# Patient Record
Sex: FEMALE | Race: WHITE | NOT HISPANIC OR LATINO | Employment: OTHER | URBAN - METROPOLITAN AREA
[De-identification: names, ages, dates, MRNs, and addresses within clinical notes are randomized per-mention and may not be internally consistent; named-entity substitution may affect disease eponyms.]

---

## 2017-05-15 PROBLEM — L50.9 URTICARIA OF UNKNOWN ORIGIN: Status: ACTIVE | Noted: 2017-05-15

## 2018-01-08 ENCOUNTER — TELEPHONE (OUTPATIENT)
Dept: CARDIOLOGY | Facility: CLINIC | Age: 73
End: 2018-01-08

## 2018-01-08 ENCOUNTER — HOSPITAL ENCOUNTER (OUTPATIENT)
Dept: RADIOLOGY | Facility: HOSPITAL | Age: 73
Discharge: HOME | End: 2018-01-08
Attending: INTERNAL MEDICINE
Payer: MEDICARE

## 2018-01-08 ENCOUNTER — OFFICE VISIT (OUTPATIENT)
Dept: CARDIOLOGY | Facility: CLINIC | Age: 73
End: 2018-01-08
Payer: MEDICARE

## 2018-01-08 VITALS
HEIGHT: 67 IN | HEART RATE: 105 BPM | BODY MASS INDEX: 24.74 KG/M2 | WEIGHT: 157.63 LBS | DIASTOLIC BLOOD PRESSURE: 85 MMHG | SYSTOLIC BLOOD PRESSURE: 151 MMHG

## 2018-01-08 DIAGNOSIS — R03.0 ELEVATED BP WITHOUT DIAGNOSIS OF HYPERTENSION: ICD-10-CM

## 2018-01-08 DIAGNOSIS — R06.02 SOB (SHORTNESS OF BREATH): Primary | ICD-10-CM

## 2018-01-08 DIAGNOSIS — R60.0 LOWER LEG EDEMA: ICD-10-CM

## 2018-01-08 DIAGNOSIS — R01.1 MURMUR, CARDIAC: ICD-10-CM

## 2018-01-08 DIAGNOSIS — R00.0 TACHYCARDIA: ICD-10-CM

## 2018-01-08 PROCEDURE — 75571 CT HRT W/O DYE W/CA TEST: CPT | Mod: TC,PO

## 2018-01-08 PROCEDURE — 75571 CT HRT W/O DYE W/CA TEST: CPT | Mod: 26,,, | Performed by: RADIOLOGY

## 2018-01-08 PROCEDURE — 99203 OFFICE O/P NEW LOW 30 MIN: CPT | Mod: S$GLB,,, | Performed by: INTERNAL MEDICINE

## 2018-01-08 PROCEDURE — 99999 PR PBB SHADOW E&M-EST. PATIENT-LVL III: CPT | Mod: PBBFAC,,, | Performed by: INTERNAL MEDICINE

## 2018-01-08 PROCEDURE — 93000 ELECTROCARDIOGRAM COMPLETE: CPT | Mod: S$GLB,,, | Performed by: INTERNAL MEDICINE

## 2018-01-08 RX ORDER — PROMETHAZINE HYDROCHLORIDE 25 MG/1
25 TABLET ORAL EVERY 6 HOURS PRN
COMMUNITY
End: 2023-02-07

## 2018-01-08 RX ORDER — BUTALBITAL, ASPIRIN, AND CAFFEINE 325; 50; 40 MG/1; MG/1; MG/1
1 CAPSULE ORAL EVERY 4 HOURS PRN
COMMUNITY
End: 2019-09-12 | Stop reason: SDUPTHER

## 2018-01-08 NOTE — PROGRESS NOTES
Subjective:    Patient ID:  Tayler Dominguez is a 72 y.o. female who presents for Shortness of Breath and Tachycardia        Shortness of Breath   This is a new problem. The current episode started more than 1 year ago. The problem occurs every several days. The problem has been unchanged. Associated symptoms include leg swelling. Pertinent negatives include no abdominal pain, chest pain, claudication, fever, headaches, hemoptysis, orthopnea, PND, rash, sputum production, syncope, vomiting or wheezing.   Palpitations    This is a recurrent problem. The current episode started more than 1 month ago. The problem occurs intermittently. The problem has been gradually worsening. The symptoms are aggravated by exercise. Associated symptoms include an irregular heartbeat, malaise/fatigue, near-syncope (ONCE WITH PALP) and shortness of breath. Pertinent negatives include no anxiety, chest pain, coughing, diaphoresis, dizziness, fever, numbness (OCC FINGERS, L ARM), syncope, vomiting or weakness. The treatment provided no relief.   Edema   This is a chronic problem. The current episode started more than 1 year ago. The problem occurs daily. The problem has been waxing and waning. Pertinent negatives include no abdominal pain, change in bowel habit, chest pain, congestion, coughing, diaphoresis, fatigue, fever, headaches, numbness (OCC FINGERS, L ARM), rash, vomiting or weakness. She has tried nothing for the symptoms. The treatment provided mild relief.     PT WAS FOLLOWED BY DR ALEXIS, H/O RFA IN / 2013, HAD CATH THEN, HAS BEEN HAVING SOME EDEMA, SOB, AN PALP/RACING, EDEMA WORSE WITH WEARING HIGH HEALS, ,PALP WORSE WITH SINGING HIGH NOTES IN [place], LABS FROM 9/2016 NOTED, H/O MVP, SEE ROS    Past Medical History:   Diagnosis Date    Allergy     Edema     Heart murmur     MVP    Menopause     Migraine headache     Sinusitis     Supraventricular tachycardia      Past Surgical History:   Procedure Laterality Date     ABDOMINAL SURGERY      APPENDECTOMY      BREAST BIOPSY      CARDIAC CATHETERIZATION  2013    COLONOSCOPY  11/15/2013    Done by Dr. ELTON Lawrence -- report in paper chart    HYSTERECTOMY      RADIOFREQUENCY ABLATION  2013    [facility]     History reviewed. No pertinent family history.  Social History     Social History    Marital status:      Spouse name: N/A    Number of children: N/A    Years of education: N/A     Social History Main Topics    Smoking status: Never Smoker    Smokeless tobacco: Never Used    Alcohol use No    Drug use: No    Sexual activity: Not Asked     Other Topics Concern    None     Social History Narrative    None       Review of patient's allergies indicates:   Allergen Reactions    Ambien [zolpidem]     Cephalosporins     Codeine     Erythropoietin analogues     Levaquin [levofloxacin]     Macrobid [nitrofurantoin monohyd/m-cryst]     Morphine     Pcn [penicillins]     Sudal [phenylephrine-pot guaiacolsulf]     Sulfa (sulfonamide antibiotics)        Current Outpatient Prescriptions:     butalbital-aspirin-caffeine -40 mg (FIORINAL) -40 mg Cap, Take 1 capsule by mouth every 4 (four) hours as needed., Disp: , Rfl:     estradiol (ESTRACE) 2 MG tablet, TAKE ONE TABLET BY MOUTH ONCE DAILY., Disp: 90 tablet, Rfl: 3    promethazine (PHENERGAN) 25 MG tablet, Take 25 mg by mouth every 6 (six) hours as needed for Nausea., Disp: , Rfl:     Review of Systems   Constitution: Positive for malaise/fatigue. Negative for diaphoresis, fatigue, fever, weakness and night sweats.   HENT: Negative for congestion, hearing loss and nosebleeds.    Eyes: Negative for blurred vision, discharge, double vision and visual disturbance.   Cardiovascular: Positive for irregular heartbeat, leg swelling, near-syncope (ONCE WITH PALP) and palpitations. Negative for chest pain, claudication, cyanosis, dyspnea on exertion, orthopnea, paroxysmal nocturnal dyspnea and syncope.  "  Respiratory: Positive for shortness of breath. Negative for cough, hemoptysis, sleep disturbances due to breathing, sputum production and wheezing.    Endocrine: Negative for cold intolerance, heat intolerance and polyuria.   Hematologic/Lymphatic: Negative for adenopathy. Does not bruise/bleed easily.   Skin: Negative for color change, itching, nail changes and rash.   Musculoskeletal: Negative for back pain (OCC), falls (ONCE ACCIDENTAL) and joint pain.   Gastrointestinal: Negative for abdominal pain, change in bowel habit, dysphagia, heartburn, hematemesis, jaundice, melena and vomiting.   Genitourinary: Negative for dysuria, flank pain, frequency, incomplete emptying and nocturia.   Neurological: Negative for brief paralysis, difficulty with concentration, disturbances in coordination, dizziness, focal weakness, headaches, light-headedness, loss of balance, numbness (OCC FINGERS, L ARM), paresthesias, seizures, sensory change and tremors.   Psychiatric/Behavioral: Negative for altered mental status, depression, memory loss and substance abuse. The patient is not nervous/anxious.    Allergic/Immunologic: Negative for hives and persistent infections.        Objective:      Vitals:    01/08/18 1428   BP: (!) 151/85   Pulse: 105   Weight: 71.5 kg (157 lb 10.1 oz)   Height: 5' 7" (1.702 m)   PainSc: 0-No pain     Body mass index is 24.69 kg/m².    Physical Exam   Constitutional: She is oriented to person, place, and time. She appears well-developed and well-nourished. She is active.   HENT:   Head: Normocephalic and atraumatic.   Mouth/Throat: Oropharynx is clear and moist and mucous membranes are normal.   Eyes: Conjunctivae and EOM are normal. Pupils are equal, round, and reactive to light.   Neck: Trachea normal and normal range of motion. Neck supple. Normal carotid pulses, no hepatojugular reflux and no JVD present. Carotid bruit is not present. No tracheal deviation, no edema and no erythema present. No " thyromegaly present.   Cardiovascular: Normal rate, regular rhythm and normal heart sounds.   No extrasystoles are present. PMI is not displaced.  Exam reveals no gallop and no friction rub.    No murmur heard.  Pulses:       Carotid pulses are 2+ on the right side, and 2+ on the left side.       Radial pulses are 2+ on the right side, and 2+ on the left side.        Femoral pulses are 2+ on the right side, and 2+ on the left side.       Popliteal pulses are 2+ on the right side, and 2+ on the left side.        Dorsalis pedis pulses are 2+ on the right side, and 2+ on the left side.        Posterior tibial pulses are 2+ on the right side, and 2+ on the left side.   Pulmonary/Chest: Effort normal and breath sounds normal. No tachypnea and no bradypnea. She has no wheezes. She has no rales. She exhibits no tenderness.   Abdominal: Soft. Bowel sounds are normal. She exhibits no distension and no mass. There is no hepatosplenomegaly. There is no tenderness. There is no guarding and no CVA tenderness.   Musculoskeletal: Normal range of motion. She exhibits no edema or tenderness.   TRACE EDEMA,  VARICOSE VEINS BY ANKLES,    Lymphadenopathy:     She has no cervical adenopathy.   Neurological: She is alert and oriented to person, place, and time. She has normal strength. She displays no tremor. No cranial nerve deficit. She exhibits normal muscle tone. Coordination normal.   Skin: Skin is warm and dry. No rash noted. No cyanosis or erythema. No pallor.   Psychiatric: She has a normal mood and affect. Her speech is normal and behavior is normal. Judgment and thought content normal.               ..    Chemistry        Component Value Date/Time     09/30/2016 1148    K 4.6 09/30/2016 1148    CL 99 09/30/2016 1148    CO2 31 09/30/2016 1148    BUN 23 (H) 09/30/2016 1148    CREATININE 0.65 09/30/2016 1148    GLU 71 09/30/2016 1148        Component Value Date/Time    CALCIUM 9.1 09/30/2016 1148    ALKPHOS 71 09/30/2016  1148    AST 34 09/30/2016 1148    AST 37 (H) 04/18/2016 2014    ALT 29 09/30/2016 1148    BILITOT 0.5 09/30/2016 1148    ESTGFRAFRICA >60 09/30/2016 1148    EGFRNONAA >60 09/30/2016 1148            ..  Lab Results   Component Value Date    CHOL 179 09/30/2016     Lab Results   Component Value Date    HDL 78 (H) 09/30/2016     Lab Results   Component Value Date    LDLCALC 81.0 09/30/2016     Lab Results   Component Value Date    TRIG 100 09/30/2016     Lab Results   Component Value Date    CHOLHDL 43.6 09/30/2016     ..  Lab Results   Component Value Date    WBC 6.11 09/30/2016    HGB 13.0 09/30/2016    HCT 40.9 09/30/2016    MCV 96 09/30/2016     09/30/2016       Test(s) Reviewed  I have reviewed the following in detail:  [] Stress test   [] Angiography   [] Echocardiogram   [x] Labs   [x] Other:       Assessment:         ICD-10-CM ICD-9-CM   1. SOB (shortness of breath) R06.02 786.05   2. Tachycardia R00.0 785.0   3. Lower leg edema R60.0 782.3   4. Elevated BP without diagnosis of hypertension R03.0 796.2   5. Murmur, cardiac R01.1 785.2     Problem List Items Addressed This Visit        Cardiac/Vascular    Tachycardia    Elevated BP without diagnosis of hypertension    Murmur, cardiac       Other    SOB (shortness of breath) - Primary    Lower leg edema           Plan:     EKG SR, WNL, WILL CHECK LABS, STRESS ECHO, HOLTER, AND CA SCORE, WATCH BP, ALL CV CLINICALLY STABLE, NO ANGINA, NO HF, NO TIA, CHECK CLINICAL ARRHYTHMIA,CONTINUE CURRENT MEDS, EDUCATION, DIET, EXERCISERTC IN FEW WEEKS AFTER TESTS      SOB (shortness of breath)    Tachycardia    Lower leg edema    Elevated BP without diagnosis of hypertension    Murmur, cardiac    RTC Low level/low impact aerobic exercise 5x's/wk. Heart healthy diet and risk factor modification.    See labs and med orders.    Aerobic exercise 5x's/wk. Heart healthy diet and risk factor modification.    See labs and med orders.

## 2018-01-30 ENCOUNTER — CLINICAL SUPPORT (OUTPATIENT)
Dept: CARDIOLOGY | Facility: CLINIC | Age: 73
End: 2018-01-30
Attending: INTERNAL MEDICINE
Payer: MEDICARE

## 2018-01-30 ENCOUNTER — LAB VISIT (OUTPATIENT)
Dept: LAB | Facility: HOSPITAL | Age: 73
End: 2018-01-30
Attending: INTERNAL MEDICINE
Payer: MEDICARE

## 2018-01-30 DIAGNOSIS — R03.0 ELEVATED BP WITHOUT DIAGNOSIS OF HYPERTENSION: ICD-10-CM

## 2018-01-30 DIAGNOSIS — R60.0 LOWER LEG EDEMA: ICD-10-CM

## 2018-01-30 DIAGNOSIS — R01.1 MURMUR, CARDIAC: ICD-10-CM

## 2018-01-30 DIAGNOSIS — R06.02 SOB (SHORTNESS OF BREATH): ICD-10-CM

## 2018-01-30 DIAGNOSIS — R00.0 TACHYCARDIA: ICD-10-CM

## 2018-01-30 LAB
ALBUMIN SERPL BCP-MCNC: 3.2 G/DL
ALP SERPL-CCNC: 66 U/L
ALT SERPL W/O P-5'-P-CCNC: 13 U/L
ANION GAP SERPL CALC-SCNC: 8 MMOL/L
AST SERPL-CCNC: 22 U/L
BASOPHILS # BLD AUTO: 0.05 K/UL
BASOPHILS NFR BLD: 0.7 %
BILIRUB SERPL-MCNC: 0.3 MG/DL
BUN SERPL-MCNC: 26 MG/DL
CALCIUM SERPL-MCNC: 8.8 MG/DL
CHLORIDE SERPL-SCNC: 106 MMOL/L
CO2 SERPL-SCNC: 27 MMOL/L
CREAT SERPL-MCNC: 0.7 MG/DL
DIFFERENTIAL METHOD: ABNORMAL
EOSINOPHIL # BLD AUTO: 0.1 K/UL
EOSINOPHIL NFR BLD: 1.3 %
ERYTHROCYTE [DISTWIDTH] IN BLOOD BY AUTOMATED COUNT: 12.8 %
EST. GFR  (AFRICAN AMERICAN): >60 ML/MIN/1.73 M^2
EST. GFR  (NON AFRICAN AMERICAN): >60 ML/MIN/1.73 M^2
GLUCOSE SERPL-MCNC: 74 MG/DL
HCT VFR BLD AUTO: 39.5 %
HGB BLD-MCNC: 12.6 G/DL
IMM GRANULOCYTES # BLD AUTO: 0.02 K/UL
IMM GRANULOCYTES NFR BLD AUTO: 0.3 %
LYMPHOCYTES # BLD AUTO: 1.7 K/UL
LYMPHOCYTES NFR BLD: 25.4 %
MCH RBC QN AUTO: 30.1 PG
MCHC RBC AUTO-ENTMCNC: 31.9 G/DL
MCV RBC AUTO: 95 FL
MONOCYTES # BLD AUTO: 0.6 K/UL
MONOCYTES NFR BLD: 8.8 %
NEUTROPHILS # BLD AUTO: 4.3 K/UL
NEUTROPHILS NFR BLD: 63.5 %
NRBC BLD-RTO: 0 /100 WBC
PLATELET # BLD AUTO: 229 K/UL
PMV BLD AUTO: 10.9 FL
POTASSIUM SERPL-SCNC: 5 MMOL/L
PROT SERPL-MCNC: 6.8 G/DL
RBC # BLD AUTO: 4.18 M/UL
SODIUM SERPL-SCNC: 141 MMOL/L
TSH SERPL DL<=0.005 MIU/L-ACNC: 2.46 UIU/ML
WBC # BLD AUTO: 6.7 K/UL

## 2018-01-30 PROCEDURE — 85025 COMPLETE CBC W/AUTO DIFF WBC: CPT

## 2018-01-30 PROCEDURE — 93224 XTRNL ECG REC UP TO 48 HRS: CPT | Mod: S$GLB,,, | Performed by: INTERNAL MEDICINE

## 2018-01-30 PROCEDURE — 93325 DOPPLER ECHO COLOR FLOW MAPG: CPT | Mod: S$GLB,,, | Performed by: INTERNAL MEDICINE

## 2018-01-30 PROCEDURE — 93351 STRESS TTE COMPLETE: CPT | Mod: S$GLB,,, | Performed by: INTERNAL MEDICINE

## 2018-01-30 PROCEDURE — 80053 COMPREHEN METABOLIC PANEL: CPT

## 2018-01-30 PROCEDURE — 93320 DOPPLER ECHO COMPLETE: CPT | Mod: S$GLB,,, | Performed by: INTERNAL MEDICINE

## 2018-01-30 PROCEDURE — 84443 ASSAY THYROID STIM HORMONE: CPT

## 2018-01-30 PROCEDURE — 36415 COLL VENOUS BLD VENIPUNCTURE: CPT | Mod: PO

## 2018-02-01 LAB
DIASTOLIC DYSFUNCTION: YES
ESTIMATED PA SYSTOLIC PRESSURE: 27.01
MITRAL VALVE REGURGITATION: ABNORMAL
RETIRED EF AND QEF - SEE NOTES: 75 (ref 55–65)
TRICUSPID VALVE REGURGITATION: ABNORMAL

## 2018-02-02 ENCOUNTER — TELEPHONE (OUTPATIENT)
Dept: CARDIOLOGY | Facility: CLINIC | Age: 73
End: 2018-02-02

## 2018-02-19 ENCOUNTER — OFFICE VISIT (OUTPATIENT)
Dept: CARDIOLOGY | Facility: CLINIC | Age: 73
End: 2018-02-19
Payer: MEDICARE

## 2018-02-19 VITALS
HEIGHT: 67 IN | WEIGHT: 158.06 LBS | SYSTOLIC BLOOD PRESSURE: 150 MMHG | HEART RATE: 99 BPM | DIASTOLIC BLOOD PRESSURE: 82 MMHG | BODY MASS INDEX: 24.81 KG/M2

## 2018-02-19 DIAGNOSIS — I49.49 PREMATURE BEATS: ICD-10-CM

## 2018-02-19 DIAGNOSIS — I34.0 NON-RHEUMATIC MITRAL REGURGITATION: ICD-10-CM

## 2018-02-19 DIAGNOSIS — I34.0 NON-RHEUMATIC MITRAL REGURGITATION: Primary | ICD-10-CM

## 2018-02-19 DIAGNOSIS — R91.1 PULMONARY NODULE: ICD-10-CM

## 2018-02-19 DIAGNOSIS — I10 ESSENTIAL HYPERTENSION: ICD-10-CM

## 2018-02-19 DIAGNOSIS — R06.02 SOB (SHORTNESS OF BREATH): ICD-10-CM

## 2018-02-19 DIAGNOSIS — R93.1 AGATSTON CORONARY ARTERY CALCIUM SCORE LESS THAN 100: ICD-10-CM

## 2018-02-19 DIAGNOSIS — I49.49 PREMATURE BEATS: Primary | ICD-10-CM

## 2018-02-19 PROCEDURE — 99999 PR PBB SHADOW E&M-EST. PATIENT-LVL III: CPT | Mod: PBBFAC,,, | Performed by: INTERNAL MEDICINE

## 2018-02-19 PROCEDURE — 3008F BODY MASS INDEX DOCD: CPT | Mod: S$GLB,,, | Performed by: INTERNAL MEDICINE

## 2018-02-19 PROCEDURE — 1126F AMNT PAIN NOTED NONE PRSNT: CPT | Mod: S$GLB,,, | Performed by: INTERNAL MEDICINE

## 2018-02-19 PROCEDURE — 99214 OFFICE O/P EST MOD 30 MIN: CPT | Mod: S$GLB,,, | Performed by: INTERNAL MEDICINE

## 2018-02-19 PROCEDURE — 1159F MED LIST DOCD IN RCRD: CPT | Mod: S$GLB,,, | Performed by: INTERNAL MEDICINE

## 2018-02-19 RX ORDER — SODIUM CHLORIDE 9 MG/ML
INJECTION, SOLUTION INTRAVENOUS ONCE
Status: CANCELLED | OUTPATIENT
Start: 2018-02-19 | End: 2018-02-19

## 2018-02-19 RX ORDER — METOPROLOL SUCCINATE 25 MG/1
25 TABLET, EXTENDED RELEASE ORAL DAILY
Qty: 30 TABLET | Refills: 2 | Status: SHIPPED | OUTPATIENT
Start: 2018-02-19 | End: 2018-04-19 | Stop reason: SDUPTHER

## 2018-02-19 NOTE — PATIENT INSTRUCTIONS
PAULIE    Arrive for your procedure at:  [facility] ON 3/1/18, AT 0630, MAIN LOBBY/patient information desk, for PAULIE at  8am,      ? FASTING:  You MAY NOT have anything to eat or drink AFTER MIDNIGHT.  If your procedure is scheduled in the afternoon, you may have a LIGHT BREAKFAST BEFORE 6:00 A.M.  For example: Two slices of toast; black coffee or black tea.    ? MEDICATIONS:  You may take your regular morning medications with a small sip of water.  Hold or adjust the following:   Fluid pills.   Diabetes medications.   Continue: Coumadin, Plavix, Effient, & Aspirin

## 2018-02-19 NOTE — PROGRESS NOTES
Subjective:    Patient ID:  Tayler Dominguez is a 72 y.o. female who presents for Follow-up (Echo, Stress and Ca. score)        HPI    DISCUSSED TESTS AND GOALS, HOTER PVC;S, PAC'S, STRESS ECHO LVH, WITH MODERATE ECCENTRIC MR, STILL GETS SHORT OF BREATH WITH PALP, CA SCORE 1-3,LUNG NODULE,  UNCOMFORTABLE LYING ON R SIDE ,LABS, CMP, CBC /TSH OK,  , SEE ROS  Past Medical History:   Diagnosis Date    Allergy     Edema     Heart murmur     MVP    Menopause     Migraine headache     Sinusitis     Supraventricular tachycardia      Past Surgical History:   Procedure Laterality Date    ABDOMINAL SURGERY      APPENDECTOMY      BREAST BIOPSY      CARDIAC CATHETERIZATION  2013    COLONOSCOPY  11/15/2013    Done by Dr. ELTON Lawrence -- report in paper chart    HYSTERECTOMY      RADIOFREQUENCY ABLATION  2013    at [facility]     History reviewed. No pertinent family history.  Social History     Social History    Marital status:      Spouse name: N/A    Number of children: N/A    Years of education: N/A     Social History Main Topics    Smoking status: Never Smoker    Smokeless tobacco: Never Used    Alcohol use No    Drug use: No    Sexual activity: Not Asked     Other Topics Concern    None     Social History Narrative    None       Review of patient's allergies indicates:   Allergen Reactions    Ambien [zolpidem]     Cephalosporins     Codeine     Erythropoietin analogues     Levaquin [levofloxacin]     Macrobid [nitrofurantoin monohyd/m-cryst]     Morphine     Pcn [penicillins]     Sudal [phenylephrine-pot guaiacolsulf]     Sulfa (sulfonamide antibiotics)        Current Outpatient Prescriptions:     butalbital-aspirin-caffeine -40 mg (FIORINAL) -40 mg Cap, Take 1 capsule by mouth every 4 (four) hours as needed., Disp: , Rfl:     estradiol (ESTRACE) 2 MG tablet, TAKE ONE TABLET BY MOUTH ONCE DAILY., Disp: 90 tablet, Rfl: 3    promethazine (PHENERGAN) 25 MG tablet, Take 25 mg by mouth  "every 6 (six) hours as needed for Nausea., Disp: , Rfl:     metoprolol succinate (TOPROL-XL) 25 MG 24 hr tablet, Take 1 tablet (25 mg total) by mouth once daily., Disp: 30 tablet, Rfl: 2    Review of Systems   Constitution: Positive for malaise/fatigue. Negative for diaphoresis, fever, weakness and night sweats.   HENT: Negative for congestion and nosebleeds.    Eyes: Negative for blurred vision, discharge, double vision and visual disturbance.   Cardiovascular: Positive for dyspnea on exertion, irregular heartbeat and palpitations. Negative for chest pain, claudication, cyanosis, leg swelling (SOME), near-syncope, orthopnea, paroxysmal nocturnal dyspnea and syncope.   Respiratory: Positive for shortness of breath. Negative for cough, hemoptysis and wheezing.    Endocrine: Negative for cold intolerance, heat intolerance and polyuria.   Hematologic/Lymphatic: Negative for adenopathy. Does not bruise/bleed easily.   Skin: Negative for color change, itching, nail changes and rash.   Musculoskeletal: Negative for back pain (OCC) and falls (ONCE ACCIDENTAL).   Gastrointestinal: Negative for abdominal pain, change in bowel habit, dysphagia, heartburn, hematemesis, jaundice, melena and vomiting.   Genitourinary: Negative for dysuria, flank pain and frequency.   Neurological: Negative for brief paralysis, difficulty with concentration, disturbances in coordination, dizziness, focal weakness, loss of balance, numbness (OCC FINGERS, L ARM), paresthesias and tremors.   Psychiatric/Behavioral: Negative for altered mental status, depression and substance abuse. The patient is not nervous/anxious.    Allergic/Immunologic: Negative for hives and persistent infections.        Objective:      Vitals:    02/19/18 1024   BP: (!) 150/82   Pulse: 99   Weight: 71.7 kg (158 lb 1.1 oz)   Height: 5' 7" (1.702 m)   PainSc: 0-No pain     Body mass index is 24.76 kg/m².    Physical Exam   Constitutional: She is oriented to person, place, and " time. She appears well-developed and well-nourished. She is active.   HENT:   Head: Normocephalic and atraumatic.   Mouth/Throat: Oropharynx is clear and moist and mucous membranes are normal.   Eyes: Conjunctivae and EOM are normal. Pupils are equal, round, and reactive to light.   Neck: Trachea normal and normal range of motion. Neck supple. Normal carotid pulses, no hepatojugular reflux and no JVD present. Carotid bruit is not present. No tracheal deviation, no edema and no erythema present. No thyromegaly present.   Cardiovascular: Normal rate and regular rhythm.   No extrasystoles are present. PMI is not displaced.  Exam reveals no gallop and no friction rub.    Murmur heard.  High-pitched blowing holosystolic murmur is present with a grade of 2/6  at the apex  Pulses:       Carotid pulses are 2+ on the right side, and 2+ on the left side.       Radial pulses are 2+ on the right side, and 2+ on the left side.        Femoral pulses are 2+ on the right side, and 2+ on the left side.       Dorsalis pedis pulses are 2+ on the right side, and 2+ on the left side.        Posterior tibial pulses are 2+ on the right side, and 2+ on the left side.   Pulmonary/Chest: Effort normal and breath sounds normal. No tachypnea and no bradypnea. She has no wheezes. She has no rales. She exhibits no tenderness.   Abdominal: Soft. Bowel sounds are normal. She exhibits no distension and no mass. There is no hepatosplenomegaly. There is no tenderness. There is no guarding and no CVA tenderness.   Musculoskeletal: Normal range of motion. She exhibits no edema or tenderness.   TRACE EDEMA,  VARICOSE VEINS BY ANKLES,    Lymphadenopathy:     She has no cervical adenopathy.   Neurological: She is alert and oriented to person, place, and time. She has normal strength. She displays no tremor. No cranial nerve deficit. She exhibits normal muscle tone. Coordination normal.   Skin: Skin is warm and dry. No rash noted. No cyanosis or erythema.  No pallor.   Psychiatric: She has a normal mood and affect. Her speech is normal and behavior is normal. Judgment and thought content normal.               ..    Chemistry        Component Value Date/Time     01/30/2018 0920    K 5.0 01/30/2018 0920     01/30/2018 0920    CO2 27 01/30/2018 0920    BUN 26 (H) 01/30/2018 0920    CREATININE 0.7 01/30/2018 0920    GLU 74 01/30/2018 0920        Component Value Date/Time    CALCIUM 8.8 01/30/2018 0920    ALKPHOS 66 01/30/2018 0920    AST 22 01/30/2018 0920    AST 37 (H) 04/18/2016 2014    ALT 13 01/30/2018 0920    BILITOT 0.3 01/30/2018 0920    ESTGFRAFRICA >60.0 01/30/2018 0920    EGFRNONAA >60.0 01/30/2018 0920            ..  Lab Results   Component Value Date    CHOL 179 09/30/2016     Lab Results   Component Value Date    HDL 78 (H) 09/30/2016     Lab Results   Component Value Date    LDLCALC 81.0 09/30/2016     Lab Results   Component Value Date    TRIG 100 09/30/2016     Lab Results   Component Value Date    CHOLHDL 43.6 09/30/2016     ..  Lab Results   Component Value Date    WBC 6.70 01/30/2018    HGB 12.6 01/30/2018    HCT 39.5 01/30/2018    MCV 95 01/30/2018     01/30/2018       Test(s) Reviewed  I have reviewed the following in detail:  [] Stress test   [] Angiography   [x] Echocardiogram   [x] Labs   [x] Other:       Assessment:         ICD-10-CM ICD-9-CM   1. Non-rheumatic mitral regurgitation I34.0 424.0   2. Premature beats I49.49 427.60   3. Essential hypertension I10 401.9   4. Pulmonary nodule R91.1 793.11     Problem List Items Addressed This Visit        Pulmonary    Pulmonary nodule       Cardiac/Vascular    Non-rheumatic mitral regurgitation - Primary    Premature beats    Essential hypertension           Plan:     ADD METOPROLOL FOR HTN, PAULIE TO ASSESS MR, SEE PULMONOLOGIST ONCE,. ALL OTHER  CV CLINICALLY STABLE, NO ANGINA, NO HF, NO TIA, ? CLINICAL ARRHYTHMIA, PSVT, ,CONTINUE CURRENT MEDS, EDUCATION, DIET, EXERCISE       Non-rheumatic mitral regurgitation  -     Diet NPO; Standing  -     Protime-INR; Standing  -     Basic metabolic panel; Standing  -     CBC Without Differential; Standing  -     EKG 12-lead; Standing  -     Place in Outpatient; Standing    Premature beats    Essential hypertension    Pulmonary nodule    Other orders  -     metoprolol succinate (TOPROL-XL) 25 MG 24 hr tablet; Take 1 tablet (25 mg total) by mouth once daily.  Dispense: 30 tablet; Refill: 2  -     0.9%  NaCl infusion; Inject into the vein once.    RTC Low level/low impact aerobic exercise 5x's/wk. Heart healthy diet and risk factor modification.    See labs and med orders.    Aerobic exercise 5x's/wk. Heart healthy diet and risk factor modification.    See labs and med orders.

## 2018-03-12 ENCOUNTER — PATIENT MESSAGE (OUTPATIENT)
Dept: CARDIOLOGY | Facility: CLINIC | Age: 73
End: 2018-03-12

## 2018-03-13 DIAGNOSIS — I73.9 CLAUDICATION: ICD-10-CM

## 2018-03-13 DIAGNOSIS — M79.661 PAIN IN BOTH LOWER LEGS: Primary | ICD-10-CM

## 2018-03-13 DIAGNOSIS — M79.662 PAIN IN BOTH LOWER LEGS: Primary | ICD-10-CM

## 2018-03-19 ENCOUNTER — CLINICAL SUPPORT (OUTPATIENT)
Dept: CARDIOLOGY | Facility: CLINIC | Age: 73
End: 2018-03-19
Attending: INTERNAL MEDICINE
Payer: MEDICARE

## 2018-03-19 DIAGNOSIS — M79.662 PAIN IN BOTH LOWER LEGS: ICD-10-CM

## 2018-03-19 DIAGNOSIS — I73.9 CLAUDICATION: ICD-10-CM

## 2018-03-19 DIAGNOSIS — M79.661 PAIN IN BOTH LOWER LEGS: ICD-10-CM

## 2018-03-19 PROCEDURE — 93970 EXTREMITY STUDY: CPT | Mod: S$GLB,,, | Performed by: INTERNAL MEDICINE

## 2018-04-19 RX ORDER — METOPROLOL SUCCINATE 25 MG/1
25 TABLET, EXTENDED RELEASE ORAL DAILY
Qty: 30 TABLET | Refills: 2 | Status: SHIPPED | OUTPATIENT
Start: 2018-04-19 | End: 2018-05-14 | Stop reason: SDUPTHER

## 2018-05-14 ENCOUNTER — OFFICE VISIT (OUTPATIENT)
Dept: CARDIOLOGY | Facility: CLINIC | Age: 73
End: 2018-05-14
Payer: MEDICARE

## 2018-05-14 VITALS
DIASTOLIC BLOOD PRESSURE: 66 MMHG | BODY MASS INDEX: 24.43 KG/M2 | HEART RATE: 72 BPM | WEIGHT: 155.63 LBS | HEIGHT: 67 IN | SYSTOLIC BLOOD PRESSURE: 122 MMHG

## 2018-05-14 DIAGNOSIS — I34.0 NON-RHEUMATIC MITRAL REGURGITATION: Primary | ICD-10-CM

## 2018-05-14 DIAGNOSIS — I10 ESSENTIAL HYPERTENSION: ICD-10-CM

## 2018-05-14 DIAGNOSIS — R93.1 AGATSTON CORONARY ARTERY CALCIUM SCORE LESS THAN 100: ICD-10-CM

## 2018-05-14 DIAGNOSIS — I49.49 PREMATURE BEATS: ICD-10-CM

## 2018-05-14 PROCEDURE — 99213 OFFICE O/P EST LOW 20 MIN: CPT | Mod: S$GLB,,, | Performed by: INTERNAL MEDICINE

## 2018-05-14 PROCEDURE — 3078F DIAST BP <80 MM HG: CPT | Mod: CPTII,S$GLB,, | Performed by: INTERNAL MEDICINE

## 2018-05-14 PROCEDURE — 3074F SYST BP LT 130 MM HG: CPT | Mod: CPTII,S$GLB,, | Performed by: INTERNAL MEDICINE

## 2018-05-14 RX ORDER — METOPROLOL SUCCINATE 25 MG/1
25 TABLET, EXTENDED RELEASE ORAL DAILY
Qty: 90 TABLET | Refills: 2 | Status: SHIPPED | OUTPATIENT
Start: 2018-05-14 | End: 2019-02-07 | Stop reason: DRUGHIGH

## 2018-05-14 NOTE — PROGRESS NOTES
Subjective:    Patient ID:  Tayler Dominguez is a 72 y.o. female who presents for Follow-up (PAULIE) and Valvular Heart Disease        HPI  S/P PAULIE MILD/ M OD MR, NO SHUNT,[id number] TO LIE  ON L SIDE, NO CP, MILD DAVIS,  SEE ROS    Past Medical History:   Diagnosis Date    Allergy     Edema     Heart murmur     MVP    Hypertension     Menopause     Migraine headache     Mitral regurgitation     Sinusitis     Supraventricular tachycardia      Past Surgical History:   Procedure Laterality Date    ABDOMINAL SURGERY      APPENDECTOMY      BREAST BIOPSY      CARDIAC CATHETERIZATION  2013    COLONOSCOPY  11/15/2013    Done by Dr. ELTON Lawrence -- report in paper chart    HYSTERECTOMY      RADIOFREQUENCY ABLATION  2013    at [facility]     Family History   Problem Relation Age of Onset    Cancer Mother     Aneurysm Mother     Hypertension Mother     Heart disease Father     Hyperlipidemia Father     Hypertension Father     Diabetes Father      Social History     Social History    Marital status:      Spouse name: N/A    Number of children: N/A    Years of education: N/A     Social History Main Topics    Smoking status: Never Smoker    Smokeless tobacco: Never Used    Alcohol use No    Drug use: No    Sexual activity: Not on file     Other Topics Concern    Not on file     Social History Narrative    No narrative on file       Review of patient's allergies indicates:   Allergen Reactions    Ambien [zolpidem]     Cephalosporins     Codeine     Erythropoietin analogues     Levaquin [levofloxacin]     Macrobid [nitrofurantoin monohyd/m-cryst]     Morphine     Pcn [penicillins]     Sudal [phenylephrine-pot guaiacolsulf]     Sulfa (sulfonamide antibiotics)        Current Outpatient Prescriptions:     butalbital-aspirin-caffeine -40 mg (FIORINAL) -40 mg Cap, Take 1 capsule by mouth every 4 (four) hours as needed., Disp: , Rfl:     estradiol (ESTRACE) 2 MG tablet, Take 2 mg by mouth  "every evening., Disp: , Rfl:     metoprolol succinate (TOPROL-XL) 25 MG 24 hr tablet, Take 1 tablet (25 mg total) by mouth once daily., Disp: 90 tablet, Rfl: 2    promethazine (PHENERGAN) 25 MG tablet, Take 25 mg by mouth every 6 (six) hours as needed for Nausea., Disp: , Rfl:     torsemide (DEMADEX) 20 MG Tab, Take 1 tablet (20 mg total) by mouth once daily., Disp: 30 tablet, Rfl: 11    Review of Systems   Constitution: Positive for malaise/fatigue. Negative for diaphoresis, fever, weakness and night sweats.   HENT: Negative for congestion and nosebleeds.    Eyes: Negative for blurred vision, discharge, double vision and visual disturbance.   Cardiovascular: Negative for chest pain, claudication, cyanosis, dyspnea on exertion (MILD), irregular heartbeat (OCC), leg swelling (SOME,BETTER WITH STEROIDS/ PCP), near-syncope, orthopnea, paroxysmal nocturnal dyspnea and syncope.   Respiratory: Negative for cough, hemoptysis, shortness of breath and wheezing.    Endocrine: Negative for cold intolerance and heat intolerance.   Hematologic/Lymphatic: Negative for adenopathy. Does not bruise/bleed easily.   Skin: Negative for color change, itching and rash.   Musculoskeletal: Negative for back pain (OCC) and falls (ONCE ACCIDENTAL).   Gastrointestinal: Negative for abdominal pain, change in bowel habit, dysphagia, heartburn, hematemesis, jaundice, melena and vomiting.   Genitourinary: Negative for dysuria, flank pain and frequency.   Neurological: Negative for brief paralysis, dizziness, focal weakness, loss of balance, numbness (OCC FINGERS, L ARM), paresthesias and tremors.   Psychiatric/Behavioral: Negative for altered mental status, depression and substance abuse. The patient is not nervous/anxious.         Objective:      Vitals:    05/14/18 1051   BP: 122/66   Pulse: 72   Weight: 70.6 kg (155 lb 10.3 oz)   Height: 5' 7" (1.702 m)   PainSc: 0-No pain     Body mass index is 24.38 kg/m².    Physical Exam "   Constitutional: She is oriented to person, place, and time. She appears well-developed and well-nourished. She is active.   HENT:   Head: Normocephalic and atraumatic.   Mouth/Throat: Oropharynx is clear and moist and mucous membranes are normal.   Eyes: Conjunctivae and EOM are normal. Pupils are equal, round, and reactive to light.   Neck: Trachea normal and normal range of motion. Neck supple. Normal carotid pulses, no hepatojugular reflux and no JVD present. Carotid bruit is not present. No edema and no erythema present.   Cardiovascular: Normal rate and regular rhythm.   No extrasystoles are present. PMI is not displaced.  Exam reveals no gallop and no friction rub.    Murmur heard.  High-pitched blowing holosystolic murmur is present with a grade of 2/6  at the apex  Pulses:       Carotid pulses are 2+ on the right side, and 2+ on the left side.       Radial pulses are 2+ on the right side, and 2+ on the left side.        Femoral pulses are 2+ on the right side, and 2+ on the left side.       Dorsalis pedis pulses are 2+ on the right side, and 2+ on the left side.        Posterior tibial pulses are 2+ on the right side, and 2+ on the left side.   Pulmonary/Chest: Effort normal and breath sounds normal. No tachypnea and no bradypnea. She has no wheezes. She has no rales. She exhibits no tenderness.   Abdominal: Soft. Bowel sounds are normal. She exhibits no distension. There is no hepatosplenomegaly. There is no tenderness. There is no CVA tenderness.   Musculoskeletal: Normal range of motion. She exhibits no edema.   TRACE EDEMA,  VARICOSE VEINS BY ANKLES,    Lymphadenopathy:     She has no cervical adenopathy.   Neurological: She is alert and oriented to person, place, and time. She has normal strength. She displays no tremor. No cranial nerve deficit.   Skin: Skin is warm and dry. No cyanosis or erythema.   Psychiatric: She has a normal mood and affect. Her speech is normal and behavior is normal. Judgment  and thought content normal.               ..    Chemistry        Component Value Date/Time     01/30/2018 0920    K 5.0 01/30/2018 0920     01/30/2018 0920    CO2 27 01/30/2018 0920    BUN 26 (H) 01/30/2018 0920    CREATININE 0.7 01/30/2018 0920    GLU 74 01/30/2018 0920        Component Value Date/Time    CALCIUM 8.8 01/30/2018 0920    ALKPHOS 66 01/30/2018 0920    AST 22 01/30/2018 0920    AST 37 (H) 04/18/2016 2014    ALT 13 01/30/2018 0920    BILITOT 0.3 01/30/2018 0920    ESTGFRAFRICA >60.0 01/30/2018 0920    EGFRNONAA >60.0 01/30/2018 0920            ..  Lab Results   Component Value Date    CHOL 179 09/30/2016     Lab Results   Component Value Date    HDL 78 (H) 09/30/2016     Lab Results   Component Value Date    LDLCALC 81.0 09/30/2016     Lab Results   Component Value Date    TRIG 100 09/30/2016     Lab Results   Component Value Date    CHOLHDL 43.6 09/30/2016     ..  Lab Results   Component Value Date    WBC 6.70 01/30/2018    HGB 12.6 01/30/2018    HCT 39.5 01/30/2018    MCV 95 01/30/2018     01/30/2018       Test(s) Reviewed  I have reviewed the following in detail:  [] Stress test   [] Angiography   [x] Echocardiogram   [] Labs   [] Other:       Assessment:         ICD-10-CM ICD-9-CM   1. Non-rheumatic mitral regurgitation I34.0 424.0   2. Essential hypertension I10 401.9   3. Premature beats I49.49 427.60   4. 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  Dispense: 90 tablet; Refill: 2    RTC Low level/low impact aerobic exercise 5x's/wk. Heart healthy diet and risk factor modification.    See labs and med orders.    Aerobic exercise 5x's/wk. Heart healthy diet and risk factor modification.    See labs and med orders.

## 2018-10-01 PROBLEM — B02.9 HERPES ZOSTER WITHOUT COMPLICATION: Status: ACTIVE | Noted: 2018-10-01

## 2019-01-17 PROBLEM — N23 RENAL COLIC ON LEFT SIDE: Status: ACTIVE | Noted: 2019-01-17

## 2019-02-07 ENCOUNTER — OFFICE VISIT (OUTPATIENT)
Dept: CARDIOLOGY | Facility: CLINIC | Age: 74
End: 2019-02-07
Payer: MEDICARE

## 2019-02-07 ENCOUNTER — LAB VISIT (OUTPATIENT)
Dept: LAB | Facility: HOSPITAL | Age: 74
End: 2019-02-07
Attending: INTERNAL MEDICINE
Payer: MEDICARE

## 2019-02-07 VITALS
HEIGHT: 67 IN | BODY MASS INDEX: 25.78 KG/M2 | WEIGHT: 164.25 LBS | HEART RATE: 93 BPM | DIASTOLIC BLOOD PRESSURE: 75 MMHG | SYSTOLIC BLOOD PRESSURE: 139 MMHG

## 2019-02-07 DIAGNOSIS — R07.89 ATYPICAL CHEST PAIN: ICD-10-CM

## 2019-02-07 DIAGNOSIS — I10 ESSENTIAL HYPERTENSION: Primary | ICD-10-CM

## 2019-02-07 DIAGNOSIS — I34.0 NON-RHEUMATIC MITRAL REGURGITATION: ICD-10-CM

## 2019-02-07 DIAGNOSIS — R93.1 AGATSTON CORONARY ARTERY CALCIUM SCORE LESS THAN 100: ICD-10-CM

## 2019-02-07 DIAGNOSIS — I49.49 PREMATURE BEATS: ICD-10-CM

## 2019-02-07 DIAGNOSIS — I10 ESSENTIAL HYPERTENSION: ICD-10-CM

## 2019-02-07 LAB
ANION GAP SERPL CALC-SCNC: 8 MMOL/L
BUN SERPL-MCNC: 22 MG/DL
CALCIUM SERPL-MCNC: 9.5 MG/DL
CHLORIDE SERPL-SCNC: 103 MMOL/L
CO2 SERPL-SCNC: 27 MMOL/L
CREAT SERPL-MCNC: 0.7 MG/DL
EST. GFR  (AFRICAN AMERICAN): >60 ML/MIN/1.73 M^2
EST. GFR  (NON AFRICAN AMERICAN): >60 ML/MIN/1.73 M^2
GLUCOSE SERPL-MCNC: 120 MG/DL
POTASSIUM SERPL-SCNC: 4.1 MMOL/L
SODIUM SERPL-SCNC: 138 MMOL/L

## 2019-02-07 PROCEDURE — 1100F PTFALLS ASSESS-DOCD GE2>/YR: CPT | Mod: CPTII,S$GLB,, | Performed by: INTERNAL MEDICINE

## 2019-02-07 PROCEDURE — 36415 COLL VENOUS BLD VENIPUNCTURE: CPT | Mod: PO

## 2019-02-07 PROCEDURE — 3078F DIAST BP <80 MM HG: CPT | Mod: CPTII,S$GLB,, | Performed by: INTERNAL MEDICINE

## 2019-02-07 PROCEDURE — 3075F SYST BP GE 130 - 139MM HG: CPT | Mod: CPTII,S$GLB,, | Performed by: INTERNAL MEDICINE

## 2019-02-07 PROCEDURE — 99214 OFFICE O/P EST MOD 30 MIN: CPT | Mod: S$GLB,,, | Performed by: INTERNAL MEDICINE

## 2019-02-07 PROCEDURE — 3075F PR MOST RECENT SYSTOLIC BLOOD PRESS GE 130-139MM HG: ICD-10-PCS | Mod: CPTII,S$GLB,, | Performed by: INTERNAL MEDICINE

## 2019-02-07 PROCEDURE — 3288F PR FALLS RISK ASSESSMENT DOCUMENTED: ICD-10-PCS | Mod: CPTII,S$GLB,, | Performed by: INTERNAL MEDICINE

## 2019-02-07 PROCEDURE — 99999 PR PBB SHADOW E&M-EST. PATIENT-LVL III: CPT | Mod: PBBFAC,,, | Performed by: INTERNAL MEDICINE

## 2019-02-07 PROCEDURE — 3078F PR MOST RECENT DIASTOLIC BLOOD PRESSURE < 80 MM HG: ICD-10-PCS | Mod: CPTII,S$GLB,, | Performed by: INTERNAL MEDICINE

## 2019-02-07 PROCEDURE — 3288F FALL RISK ASSESSMENT DOCD: CPT | Mod: CPTII,S$GLB,, | Performed by: INTERNAL MEDICINE

## 2019-02-07 PROCEDURE — 99999 PR PBB SHADOW E&M-EST. PATIENT-LVL III: ICD-10-PCS | Mod: PBBFAC,,, | Performed by: INTERNAL MEDICINE

## 2019-02-07 PROCEDURE — 99214 PR OFFICE/OUTPT VISIT, EST, LEVL IV, 30-39 MIN: ICD-10-PCS | Mod: S$GLB,,, | Performed by: INTERNAL MEDICINE

## 2019-02-07 PROCEDURE — 1100F PR PT FALLS ASSESS DOC 2+ FALLS/FALL W/INJURY/YR: ICD-10-PCS | Mod: CPTII,S$GLB,, | Performed by: INTERNAL MEDICINE

## 2019-02-07 PROCEDURE — 80048 BASIC METABOLIC PNL TOTAL CA: CPT

## 2019-02-07 RX ORDER — METOPROLOL SUCCINATE 50 MG/1
50 TABLET, EXTENDED RELEASE ORAL DAILY
Qty: 30 TABLET | Refills: 7 | Status: SHIPPED | OUTPATIENT
Start: 2019-02-07 | End: 2019-06-03 | Stop reason: DRUGHIGH

## 2019-02-07 RX ORDER — METOPROLOL SUCCINATE 50 MG/1
50 TABLET, EXTENDED RELEASE ORAL DAILY
Qty: 30 TABLET | Refills: 11 | Status: SHIPPED | OUTPATIENT
Start: 2019-02-07 | End: 2019-02-07 | Stop reason: SDUPTHER

## 2019-02-07 NOTE — PROGRESS NOTES
Subjective:    Patient ID:  Tayler Dominguez is a 73 y.o. female who presents for Valvular Heart Disease and Hypertension        HPI  NO LABS, DOING OK, BP ON HIGHER END, RARE  CP, NO SOB, SEE ROS    Past Medical History:   Diagnosis Date    Allergy     Edema     Heart murmur     MVP    Hypertension     Menopause     Migraine headache     Mitral regurgitation     Sinusitis     Supraventricular tachycardia      Past Surgical History:   Procedure Laterality Date    ABDOMINAL SURGERY      APPENDECTOMY      BREAST BIOPSY      CARDIAC CATHETERIZATION  2013    COLONOSCOPY  11/15/2013    Done by Dr. ELTON Lawrence -- report in paper chart    HYSTERECTOMY      [facility]RE[facility]  2013    [facility]     Family History   Problem Relation Age of Onset    Cancer Mother     Aneurysm Mother     Hypertension Mother     Heart disease Father     Hyperlipidemia Father     Hypertension Father     Diabetes Father      Social History     Socioeconomic History    Marital status:      Spouse name: None    Number of children: None    Years of education: None    Highest education level: None   Social Needs    Financial resource strain: None    Food insecurity - worry: None    Food insecurity - inability: None    Transportation needs - medical: None    Transportation needs - non-medical: None   Occupational History    None   Tobacco Use    Smoking status: Never Smoker    Smokeless tobacco: Never Used   Substance and Sexual Activity    Alcohol use: No    Drug use: No    Sexual activity: None   Other Topics Concern    None   Social History Narrative    None       Review of patient's allergies indicates:   Allergen Reactions    Ambien [zolpidem]     Cephalosporins     Codeine     Erythropoietin analogues     Levaquin [levofloxacin]     Macrobid [nitrofurantoin monohyd/m-cryst]     Morphine     Pcn [penicillins]     Sudal [phenylephrine-pot guaiacolsulf]     Sulfa (sulfonamide antibiotics)         Current Outpatient Medications:     baclofen (LIORESAL) 10 MG tablet, TAKE 1 TABLET (10 MG TOTAL) BY MOUTH 3 (THREE) TIMES DAILY. (Patient taking differently: Take 10 mg by mouth every other day. ), Disp: 90 tablet, Rfl: 11    butalbital-aspirin-caffeine -40 mg (FIORINAL) -40 mg Cap, Take 1 capsule by mouth every 4 (four) hours as needed., Disp: , Rfl:     estradiol (ESTRACE) 2 MG tablet, Take 2 mg by mouth every evening., Disp: , Rfl:     meloxicam (MOBIC) 7.5 MG tablet, Take 1 tablet (7.5 mg total) by mouth once daily., Disp: 20 tablet, Rfl: 1    promethazine (PHENERGAN) 25 MG tablet, Take 25 mg by mouth every 6 (six) hours as needed for Nausea., Disp: , Rfl:     aspirin (ECOTRIN) 81 MG EC tablet, Take 81 mg by mouth once daily., Disp: , Rfl:     ketoconazole (NIZORAL) 2 % shampoo, APPLY TO AFFECTED AREA EXTERNALLY AS DIRECTED 3 TIMES A WEEK, Disp: , Rfl: 6    methylPREDNISolone (MEDROL DOSEPACK) 4 mg tablet, use as directed, Disp: 1 Package, Rfl: 0    metoprolol succinate (TOPROL-XL) 50 MG 24 hr tablet, Take 1 tablet (50 mg total) by mouth once daily., Disp: 30 tablet, Rfl: 7    Review of Systems   Constitution: Negative for diaphoresis, fever, weakness, malaise/fatigue and night sweats.   HENT: Negative for congestion and nosebleeds.    Eyes: Negative for blurred vision, discharge, double vision and visual disturbance.   Cardiovascular: Negative for chest pain (RARE), claudication, cyanosis, dyspnea on exertion (MILD), irregular heartbeat (OCC), leg swelling (SOME,BETTER WITH STEROIDS/ PCP), near-syncope, orthopnea, paroxysmal nocturnal dyspnea and syncope. Palpitations: OCC, ONE EPISODR FEW MIN.   Respiratory: Negative for cough, hemoptysis, shortness of breath and wheezing.    Endocrine: Negative for cold intolerance and heat intolerance.   Hematologic/Lymphatic: Negative for adenopathy. Does not bruise/bleed easily.   Skin: Negative for color change, itching and rash.  "  Musculoskeletal: Negative for back pain (OCC) and falls (ONCE ACCIDENTAL).   Gastrointestinal: Negative for abdominal pain, change in bowel habit, dysphagia, heartburn, hematemesis, jaundice, melena and vomiting.   Genitourinary: Negative for dysuria, flank pain and frequency.        KIDNEY STONE IN DECEMBER   Neurological: Negative for brief paralysis, dizziness, focal weakness, loss of balance, numbness and tremors.   Psychiatric/Behavioral: Negative for altered mental status, depression and substance abuse. The patient is not nervous/anxious.    Allergic/Immunologic: Negative for hives and persistent infections.        Objective:      Vitals:    02/07/19 1321   BP: 139/75   Pulse: 93   Weight: 74.5 kg (164 lb 3.9 oz)   Height: 5' 7" (1.702 m)   PainSc: 0-No pain     Body mass index is 25.72 kg/m².    Physical Exam   Constitutional: She is oriented to person, place, and time. She appears well-developed and well-nourished. She is active.   HENT:   Head: Normocephalic and atraumatic.   Mouth/Throat: Oropharynx is clear and moist and mucous membranes are normal.   Eyes: Conjunctivae and EOM are normal. Pupils are equal, round, and reactive to light.   Neck: Trachea normal and normal range of motion. Neck supple. Normal carotid pulses, no hepatojugular reflux and no JVD present. Carotid bruit is not present. No edema and no erythema present. No thyromegaly present.   Cardiovascular: Normal rate and regular rhythm.  No extrasystoles are present. PMI is not displaced. Exam reveals no gallop and no friction rub.   Murmur heard.  High-pitched blowing holosystolic murmur is present with a grade of 2/6 at the apex.  Pulses:       Carotid pulses are 2+ on the right side, and 2+ on the left side.       Radial pulses are 2+ on the right side, and 2+ on the left side.        Femoral pulses are 2+ on the right side, and 2+ on the left side.       Dorsalis pedis pulses are 2+ on the right side, and 2+ on the left side.        " Posterior tibial pulses are 2+ on the right side, and 2+ on the left side.   Pulmonary/Chest: Effort normal and breath sounds normal. No tachypnea and no bradypnea. She has no wheezes. She has no rales. She exhibits no tenderness.   Abdominal: Soft. Bowel sounds are normal. She exhibits no mass. There is no hepatosplenomegaly. There is no tenderness. There is no CVA tenderness.   Musculoskeletal: Normal range of motion. She exhibits no edema.   TRACE EDEMA,  VARICOSE VEINS BY ANKLES,    Lymphadenopathy:     She has no cervical adenopathy.   Neurological: She is alert and oriented to person, place, and time. She has normal strength. She displays no tremor. No cranial nerve deficit.   Skin: Skin is warm and dry. No cyanosis or erythema.   Psychiatric: She has a normal mood and affect. Her speech is normal and behavior is normal.               ..    Chemistry        Component Value Date/Time     02/07/2019 1352    K 4.1 02/07/2019 1352     02/07/2019 1352    CO2 27 02/07/2019 1352    BUN 22 02/07/2019 1352    CREATININE 0.7 02/07/2019 1352     (H) 02/07/2019 1352        Component Value Date/Time    CALCIUM 9.5 02/07/2019 1352    ALKPHOS 66 01/30/2018 0920    AST 22 01/30/2018 0920    AST 37 (H) 04/18/2016 2014    ALT 13 01/30/2018 0920    BILITOT 0.3 01/30/2018 0920    ESTGFRAFRICA >60.0 02/07/2019 1352    EGFRNONAA >60.0 02/07/2019 1352            ..  Lab Results   Component Value Date    CHOL 179 09/30/2016     Lab Results   Component Value Date    HDL 78 (H) 09/30/2016     Lab Results   Component Value Date    LDLCALC 81.0 09/30/2016     Lab Results   Component Value Date    TRIG 100 09/30/2016     Lab Results   Component Value Date    CHOLHDL 43.6 09/30/2016     ..  Lab Results   Component Value Date    WBC 6.70 01/30/2018    HGB 12.6 01/30/2018    HCT 39.5 01/30/2018    MCV 95 01/30/2018     01/30/2018       Test(s) Reviewed  I have reviewed the following in detail:  [] Stress test   []  Angiography   [] Echocardiogram   [] Labs   [] Other:       Assessment:         ICD-10-CM ICD-9-CM   1. Essential hypertension I10 401.9   2. Non-rheumatic mitral regurgitation I34.0 424.0   3. Atypical chest pain R07.89 786.59   4. 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       Other    Atypical chest pain           Plan:     INCREASE TOPROL TO 50 MG, WATCH BP, ALL CV CLINICALLY STABLE, NO ANGINA, NO HF, NO TIA, NO SIGNIFICANT CLINICAL ARRHYTHMIA,CONTINUE CURRENT MEDS, EDUCATION, DIET, EXERCISE, RTC IN 8 MO, CHECK BMP TODAY      Essential hypertension  Comments:  ADJUST MEDS  Orders:  -     Basic metabolic panel; Future; Expected date: 02/07/2019  -     EKG 12-lead; Future; Expected date: 08/07/2019    Non-rheumatic mitral regurgitation  Comments:  AFTERLOAD REDUCTION    Atypical chest pain  Comments:  RARE WITH HIGH BP    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  Dispense: 30 tablet; Refill: 11  -     metoprolol succinate (TOPROL-XL) 50 MG 24 hr tablet; Take 1 tablet (50 mg total) by mouth once daily.  Dispense: 30 tablet; Refill: 7    RTC Low level/low impact aerobic exercise 5x's/wk. Heart healthy diet and risk factor modification.    See labs and med orders.    Aerobic exercise 5x's/wk. Heart healthy diet and risk factor modification.    See labs and med orders.

## 2019-04-18 RX ORDER — METOPROLOL SUCCINATE 50 MG/1
50 TABLET, EXTENDED RELEASE ORAL DAILY
Qty: 90 TABLET | Refills: 1 | Status: SHIPPED | OUTPATIENT
Start: 2019-04-18 | End: 2019-05-16 | Stop reason: SINTOL

## 2019-05-15 ENCOUNTER — TELEPHONE (OUTPATIENT)
Dept: CARDIOLOGY | Facility: CLINIC | Age: 74
End: 2019-05-15

## 2019-05-16 RX ORDER — METOPROLOL SUCCINATE 25 MG/1
25 TABLET, EXTENDED RELEASE ORAL DAILY
Qty: 30 TABLET | Refills: 3 | Status: SHIPPED | OUTPATIENT
Start: 2019-05-16 | End: 2019-08-23 | Stop reason: SDUPTHER

## 2019-05-16 NOTE — TELEPHONE ENCOUNTER
----- Message from Erika Magdaleno sent at 5/16/2019  8:39 AM CDT -----  Contact: [phone]   Type:  Patient Returning Call    Who Called:  self  Who Left Message for Patient:  nurse  Does the patient know what this is regarding?:  Toprol 25 mgs   Best Call Back Number: [phone] (home)     [address] [address]  Phone: [phone] Fax: [phone]     feels the 50 mgs causes fatigue

## 2019-08-26 RX ORDER — METOPROLOL SUCCINATE 25 MG/1
25 TABLET, EXTENDED RELEASE ORAL DAILY
Qty: 30 TABLET | Refills: 0 | Status: SHIPPED | OUTPATIENT
Start: 2019-08-26 | End: 2019-09-09 | Stop reason: SDUPTHER

## 2019-09-09 ENCOUNTER — OFFICE VISIT (OUTPATIENT)
Dept: CARDIOLOGY | Facility: CLINIC | Age: 74
End: 2019-09-09
Payer: MEDICARE

## 2019-09-09 VITALS
DIASTOLIC BLOOD PRESSURE: 77 MMHG | HEIGHT: 67 IN | BODY MASS INDEX: 23.7 KG/M2 | HEART RATE: 82 BPM | WEIGHT: 151 LBS | SYSTOLIC BLOOD PRESSURE: 137 MMHG

## 2019-09-09 DIAGNOSIS — I49.49 PREMATURE BEATS: ICD-10-CM

## 2019-09-09 DIAGNOSIS — I10 ESSENTIAL HYPERTENSION: Primary | ICD-10-CM

## 2019-09-09 DIAGNOSIS — I34.0 NON-RHEUMATIC MITRAL REGURGITATION: ICD-10-CM

## 2019-09-09 DIAGNOSIS — R00.0 TACHYCARDIA: ICD-10-CM

## 2019-09-09 DIAGNOSIS — R09.89 BRUIT OF RIGHT CAROTID ARTERY: ICD-10-CM

## 2019-09-09 PROBLEM — R01.1 MURMUR, CARDIAC: Status: RESOLVED | Noted: 2018-01-08 | Resolved: 2019-09-09

## 2019-09-09 PROBLEM — R03.0 ELEVATED BP WITHOUT DIAGNOSIS OF HYPERTENSION: Status: RESOLVED | Noted: 2018-01-08 | Resolved: 2019-09-09

## 2019-09-09 PROCEDURE — 1100F PTFALLS ASSESS-DOCD GE2>/YR: CPT | Mod: CPTII,S$GLB,, | Performed by: INTERNAL MEDICINE

## 2019-09-09 PROCEDURE — 3075F PR MOST RECENT SYSTOLIC BLOOD PRESS GE 130-139MM HG: ICD-10-PCS | Mod: CPTII,S$GLB,, | Performed by: INTERNAL MEDICINE

## 2019-09-09 PROCEDURE — 3288F PR FALLS RISK ASSESSMENT DOCUMENTED: ICD-10-PCS | Mod: CPTII,S$GLB,, | Performed by: INTERNAL MEDICINE

## 2019-09-09 PROCEDURE — 93000 ELECTROCARDIOGRAM COMPLETE: CPT | Mod: S$GLB,,, | Performed by: INTERNAL MEDICINE

## 2019-09-09 PROCEDURE — 3078F PR MOST RECENT DIASTOLIC BLOOD PRESSURE < 80 MM HG: ICD-10-PCS | Mod: CPTII,S$GLB,, | Performed by: INTERNAL MEDICINE

## 2019-09-09 PROCEDURE — 93000 EKG 12-LEAD: ICD-10-PCS | Mod: S$GLB,,, | Performed by: INTERNAL MEDICINE

## 2019-09-09 PROCEDURE — 99214 PR OFFICE/OUTPT VISIT, EST, LEVL IV, 30-39 MIN: ICD-10-PCS | Mod: S$GLB,,, | Performed by: INTERNAL MEDICINE

## 2019-09-09 PROCEDURE — 99999 PR PBB SHADOW E&M-EST. PATIENT-LVL III: ICD-10-PCS | Mod: PBBFAC,,, | Performed by: INTERNAL MEDICINE

## 2019-09-09 PROCEDURE — 99999 PR PBB SHADOW E&M-EST. PATIENT-LVL III: CPT | Mod: PBBFAC,,, | Performed by: INTERNAL MEDICINE

## 2019-09-09 PROCEDURE — 99214 OFFICE O/P EST MOD 30 MIN: CPT | Mod: S$GLB,,, | Performed by: INTERNAL MEDICINE

## 2019-09-09 PROCEDURE — 3288F FALL RISK ASSESSMENT DOCD: CPT | Mod: CPTII,S$GLB,, | Performed by: INTERNAL MEDICINE

## 2019-09-09 PROCEDURE — 3078F DIAST BP <80 MM HG: CPT | Mod: CPTII,S$GLB,, | Performed by: INTERNAL MEDICINE

## 2019-09-09 PROCEDURE — 1100F PR PT FALLS ASSESS DOC 2+ FALLS/FALL W/INJURY/YR: ICD-10-PCS | Mod: CPTII,S$GLB,, | Performed by: INTERNAL MEDICINE

## 2019-09-09 PROCEDURE — 3075F SYST BP GE 130 - 139MM HG: CPT | Mod: CPTII,S$GLB,, | Performed by: INTERNAL MEDICINE

## 2019-09-09 RX ORDER — METOPROLOL SUCCINATE 25 MG/1
25 TABLET, EXTENDED RELEASE ORAL DAILY
Qty: 90 TABLET | Refills: 2 | Status: SHIPPED | OUTPATIENT
Start: 2019-09-09 | End: 2020-04-23

## 2019-09-09 NOTE — PROGRESS NOTES
Subjective:    Patient ID:  Tayler Dominguez is a 73 y.o. female who presents for Hypertension and Valvular Heart Disease        HPI  Last  LABS BMP OK IN FEB, DOING WELL, OCC PALP, NO CHEST PAIN, NO SIGNIFICANT SHORTNESS OF BREATH, NO TIA TYPE SYMPTOMS, NO NEAR-SYNCOPE, SEE ROS    Past Medical History:   Diagnosis Date    Allergy     Edema     Heart murmur     MVP    Hypertension     Menopause     Migraine headache     Mitral regurgitation     Sinusitis     Supraventricular tachycardia      Past Surgical History:   Procedure Laterality Date    ABDOMINAL SURGERY      APPENDECTOMY      BREAST BIOPSY      CARDIAC CATHETERIZATION  2013    COLONOSCOPY  11/15/2013    Done by Dr. ELTON Lawrence -- report in paper chart    HYSTERECTOMY      RADIO[facility]REQU[facility]Y [facility]BLATION  2013    at [facility]     Family History   Problem Relation Age of Onset    Cancer Mother     Aneurysm Mother     Hypertension Mother     Heart disease Father     Hyperlipidemia Father     Hypertension Father     Diabetes Father      Social History     Socioeconomic History    Marital status:      Spouse name: Not on file    Number of children: Not on file    Years of education: Not on file    Highest education level: Not on file   Occupational History    Not on file   Social Needs    Financial resource strain: Not on file    Food insecurity:     Worry: Not on file     Inability: Not on file    Transportation needs:     Medical: Not on file     Non-medical: Not on file   Tobacco Use    Smoking status: Never Smoker    Smokeless tobacco: Never Used   Substance and Sexual Activity    Alcohol use: No    Drug use: No    Sexual activity: Not on file   Lifestyle    Physical activity:     Days per week: Not on file     Minutes per session: Not on file    Stress: Not on file   Relationships    Social connections:     Talks on phone: Not on file     Gets together: Not on file     Attends Hoahaoism service: Not on file     Active member of  club or organization: Not on file     Attends meetings of clubs or organizations: Not on file     Relationship status: Not on file   Other Topics Concern    Not on file   Social History Narrative    Not on file       Review of patient's allergies indicates:   Allergen Reactions    Ambien [zolpidem]     Cephalosporins     Cleocin [clindamycin hcl]     Codeine     Erythropoietin analogues     Levaquin [levofloxacin]     Macrobid [nitrofurantoin monohyd/m-cryst]     Morphine     Pcn [penicillins]     Sudal [phenylephrine-pot guaiacolsulf]     Sulfa (sulfonamide antibiotics)        Current Outpatient Medications:     aspirin (ECOTRIN) 81 MG EC tablet, Take 81 mg by mouth once daily., Disp: , Rfl:     baclofen (LIORESAL) 10 MG tablet, TAKE 1 TABLET (10 MG TOTAL) BY MOUTH 3 (THREE) TIMES DAILY. (Patient taking differently: Take 10 mg by mouth every other day. ), Disp: 90 tablet, Rfl: 11    estradiol (ESTRACE) 2 MG tablet, TAKE ONE TABLET BY MOUTH ONCE DAILY., Disp: 90 tablet, Rfl: 4    ketoconazole (NIZORAL) 2 % shampoo, APPLY TO AFFECTED AREA EXTERNALLY AS DIRECTED 3 TIMES A WEEK, Disp: , Rfl: 6    levocetirizine (XYZAL) 5 MG tablet, TAKE 1 TABLET (5 MG TOTAL) BY MOUTH EVERY EVENING., Disp: 30 tablet, Rfl: 11    meloxicam (MOBIC) 7.5 MG tablet, Take 1 tablet (7.5 mg total) by mouth once daily., Disp: 20 tablet, Rfl: 1    metoprolol succinate (TOPROL-XL) 25 MG 24 hr tablet, Take 1 tablet (25 mg total) by mouth once daily., Disp: 90 tablet, Rfl: 2    promethazine (PHENERGAN) 25 MG tablet, Take 25 mg by mouth every 6 (six) hours as needed for Nausea., Disp: , Rfl:     valACYclovir (VALTREX) 500 MG tablet, Take 1 tablet (500 mg total) by mouth 2 (two) times daily., Disp: 10 tablet, Rfl: 1    butalbital-aspirin-caffeine -40 mg (FIORINAL) -40 mg Cap, Take 1 capsule by mouth every 4 (four) hours as needed., Disp: 45 capsule, Rfl: 2    ondansetron (ZOFRAN) 4 MG tablet, Take 1 tablet (4 mg  "total) by mouth every 8 (eight) hours as needed for Nausea., Disp: 25 tablet, Rfl: 2    scopolamine (TRANSDERM-SCOP) 1.3-1.5 mg (1 mg over 3 days), Place 1 patch onto the skin every 72 hours., Disp: 3 patch, Rfl: 0    Review of Systems   Constitution: Negative for chills, diaphoresis, fever, malaise/fatigue and night sweats.   HENT: Negative for congestion and nosebleeds.    Eyes: Negative for blurred vision and visual disturbance.   Cardiovascular: Negative for chest pain, claudication, cyanosis, dyspnea on exertion (MILD), irregular heartbeat (OCC), leg swelling (OCC), near-syncope, orthopnea, palpitations (OCC, ONE EPISODR FEW MIN), paroxysmal nocturnal dyspnea and syncope.   Respiratory: Negative for cough, hemoptysis, shortness of breath and wheezing.    Endocrine: Negative for cold intolerance and heat intolerance.   Hematologic/Lymphatic: Negative for adenopathy. Does not bruise/bleed easily.   Skin: Negative for color change, itching and rash.   Musculoskeletal: Negative for back pain (OCC) and falls (ONCE ACCIDENTAL).   Gastrointestinal: Negative for abdominal pain, change in bowel habit, dysphagia, heartburn, hematemesis, jaundice, melena and vomiting.   Genitourinary: Negative for dysuria, flank pain and frequency.        KIDNEY STONE IN DECEMBER   Neurological: Negative for brief paralysis, dizziness, focal weakness, loss of balance, numbness, tremors and weakness.   Psychiatric/Behavioral: Negative for altered mental status and depression. The patient is not nervous/anxious.    Allergic/Immunologic: Negative for hives and persistent infections.        Objective:      Vitals:    09/09/19 1317   BP: 137/77   Pulse: 82   Weight: 68.5 kg (151 lb 0.2 oz)   Height: 5' 7" (1.702 m)   PainSc: 0-No pain     Body mass index is 23.65 kg/m².    Physical Exam   Constitutional: She is oriented to person, place, and time. She appears well-developed and well-nourished. She is active.   HENT:   Head: Normocephalic and " atraumatic.   Mouth/Throat: Oropharynx is clear and moist and mucous membranes are normal.   Eyes: Pupils are equal, round, and reactive to light. Conjunctivae and EOM are normal.   Neck: Trachea normal and normal range of motion. Neck supple. Normal carotid pulses, no hepatojugular reflux and no JVD present. Carotid bruit is not present. No edema and no erythema present. No thyromegaly present.   Cardiovascular: Normal rate and regular rhythm.  No extrasystoles are present. PMI is not displaced. Exam reveals no gallop and no friction rub.   Murmur heard.  High-pitched blowing holosystolic murmur is present with a grade of 2/6 at the apex.  Pulses:       Carotid pulses are 2+ on the right side, and 2+ on the left side.       Radial pulses are 2+ on the right side, and 2+ on the left side.        Femoral pulses are 2+ on the right side, and 2+ on the left side.       Dorsalis pedis pulses are 2+ on the right side, and 2+ on the left side.        Posterior tibial pulses are 2+ on the right side, and 2+ on the left side.   Pulmonary/Chest: Effort normal and breath sounds normal. No tachypnea and no bradypnea. She has no wheezes. She has no rales. She exhibits no tenderness.   Abdominal: Soft. Bowel sounds are normal. She exhibits no mass. There is no hepatosplenomegaly. There is no tenderness. There is no CVA tenderness.   Musculoskeletal: Normal range of motion. She exhibits no edema.   NO EDEMA,  VARICOSE VEINS BY ANKLES,    Lymphadenopathy:     She has no cervical adenopathy.   Neurological: She is alert and oriented to person, place, and time. She has normal strength. She displays no tremor. No cranial nerve deficit.   Skin: Skin is warm and dry. No cyanosis or erythema.   Psychiatric: She has a normal mood and affect. Her speech is normal and behavior is normal.               ..    Chemistry        Component Value Date/Time     02/07/2019 1352    K 4.1 02/07/2019 1352     02/07/2019 1352    CO2 27  02/07/2019 1352    BUN 22 02/07/2019 1352    CREATININE 0.7 02/07/2019 1352     (H) 02/07/2019 1352        Component Value Date/Time    CALCIUM 9.5 02/07/2019 1352    ALKPHOS 66 01/30/2018 0920    AST 22 01/30/2018 0920    AST 37 (H) 04/18/2016 2014    ALT 13 01/30/2018 0920    BILITOT 0.3 01/30/2018 0920    ESTGFRAFRICA >60.0 02/07/2019 1352    EGFRNONAA >60.0 02/07/2019 1352            ..  Lab Results   Component Value Date    CHOL 179 09/30/2016     Lab Results   Component Value Date    HDL 78 (H) 09/30/2016     Lab Results   Component Value Date    LDLCALC 81.0 09/30/2016     Lab Results   Component Value Date    TRIG 100 09/30/2016     Lab Results   Component Value Date    CHOLHDL 43.6 09/30/2016     ..  Lab Results   Component Value Date    WBC 6.70 01/30/2018    HGB 12.6 01/30/2018    HCT 39.5 01/30/2018    MCV 95 01/30/2018     01/30/2018       Test(s) Reviewed  I have reviewed the following in detail:  [] Stress test   [] Angiography   [] Echocardiogram   [x] Labs   [] Other:       Assessment:         ICD-10-CM ICD-9-CM   1. Essential hypertension I10 401.9   2. Bruit of right carotid artery R09.89 785.9   3. Premature beatsChronic I49.49 427.60   4. Non-rheumatic mitral regurgitation I34.0 424.0   5. Tachycardia R00.0 785.0     Problem List Items Addressed This Visit        Cardiac/Vascular    Tachycardia    Non-rheumatic mitral regurgitation    Premature beats    Essential hypertension - Primary    Bruit of right carotid artery    Relevant Orders    CV Ultrasound Bilateral Doppler Carotid           Plan:     EKG SR, WNL, CHECK CAROTID US SOON,PRN EXTRA 1/2 TOPROL AS EXPLAINED,  ALL OTHER CV CLINICALLY STABLE, NO ANGINA, NO HF, NO TIA, NO CLINICAL ARRHYTHMIA,CONTINUE CURRENT MEDS, EDUCATION, DIET, EXERCISE, RTC IN 8-9 MO       Essential hypertension  Comments:  STABLE  Orders:  -     EKG 12-lead    Bruit of right carotid artery  Comments:  US  Orders:  -     CV Ultrasound Bilateral Doppler  Carotid; Future    Premature beats  Comments:  BB  Orders:  -     EKG 12-lead    Non-rheumatic mitral regurgitation    Tachycardia    Other orders  -     metoprolol succinate (TOPROL-XL) 25 MG 24 hr tablet; Take 1 tablet (25 mg total) by mouth once daily.  Dispense: 90 tablet; Refill: 2    RTC Low level/low impact aerobic exercise 5x's/wk. Heart healthy diet and risk factor modification.    See labs and med orders.    Aerobic exercise 5x's/wk. Heart healthy diet and risk factor modification.    See labs and med orders.

## 2019-09-13 ENCOUNTER — CLINICAL SUPPORT (OUTPATIENT)
Dept: CARDIOLOGY | Facility: CLINIC | Age: 74
End: 2019-09-13
Attending: INTERNAL MEDICINE
Payer: MEDICARE

## 2019-09-13 DIAGNOSIS — R09.89 BRUIT OF RIGHT CAROTID ARTERY: ICD-10-CM

## 2019-09-13 LAB
LEFT ARM DIASTOLIC BLOOD PRESSURE: 77 MMHG
LEFT ARM SYSTOLIC BLOOD PRESSURE: 137 MMHG
LEFT CBA DIAS: 34 CM/S
LEFT CBA SYS: 96 CM/S
LEFT CCA DIST DIAS: 33 CM/S
LEFT CCA DIST SYS: 106 CM/S
LEFT CCA MID DIAS: 41 CM/S
LEFT CCA MID SYS: 131 CM/S
LEFT CCA PROX DIAS: 39 CM/S
LEFT CCA PROX SYS: 125 CM/S
LEFT ECA DIAS: 15 CM/S
LEFT ECA SYS: 91 CM/S
LEFT ICA DIST DIAS: 31 CM/S
LEFT ICA DIST SYS: 80 CM/S
LEFT ICA MID DIAS: 43 CM/S
LEFT ICA MID SYS: 134 CM/S
LEFT ICA PROX DIAS: 34 CM/S
LEFT ICA PROX SYS: 122 CM/S
LEFT VERTEBRAL DIAS: 26 CM/S
LEFT VERTEBRAL SYS: 77 CM/S
OHS CV CAROTID RIGHT ICA EDV HIGHEST: 40
OHS CV CAROTID ULTRASOUND LEFT ICA/CCA RATIO: 1.02
OHS CV CAROTID ULTRASOUND RIGHT ICA/CCA RATIO: 1.04
OHS CV PV CAROTID LEFT HIGHEST CCA: 131
OHS CV PV CAROTID LEFT HIGHEST ICA: 134
OHS CV PV CAROTID RIGHT HIGHEST CCA: 119
OHS CV PV CAROTID RIGHT HIGHEST ICA: 124
OHS CV US CAROTID LEFT HIGHEST EDV: 43
RIGHT ARM DIASTOLIC BLOOD PRESSURE: 77 MMHG
RIGHT ARM SYSTOLIC BLOOD PRESSURE: 137 MMHG
RIGHT CBA DIAS: 29 CM/S
RIGHT CBA SYS: 92 CM/S
RIGHT CCA DIST DIAS: 30 CM/S
RIGHT CCA DIST SYS: 100 CM/S
RIGHT CCA MID DIAS: 17 CM/S
RIGHT CCA MID SYS: 85 CM/S
RIGHT CCA PROX DIAS: 30 CM/S
RIGHT CCA PROX SYS: 119 CM/S
RIGHT ECA DIAS: 13 CM/S
RIGHT ECA SYS: 104 CM/S
RIGHT ICA DIST DIAS: 22 CM/S
RIGHT ICA DIST SYS: 62 CM/S
RIGHT ICA MID DIAS: 40 CM/S
RIGHT ICA MID SYS: 124 CM/S
RIGHT ICA PROX DIAS: 23 CM/S
RIGHT ICA PROX SYS: 90 CM/S
RIGHT VERTEBRAL DIAS: 21 CM/S
RIGHT VERTEBRAL SYS: 85 CM/S

## 2019-09-13 PROCEDURE — 93880 CV US DOPPLER CAROTID (CUPID ONLY): ICD-10-PCS | Mod: S$GLB,,, | Performed by: INTERNAL MEDICINE

## 2019-09-13 PROCEDURE — 93880 EXTRACRANIAL BILAT STUDY: CPT | Mod: S$GLB,,, | Performed by: INTERNAL MEDICINE

## 2019-09-16 ENCOUNTER — TELEPHONE (OUTPATIENT)
Dept: CARDIOLOGY | Facility: CLINIC | Age: 74
End: 2019-09-16

## 2019-12-06 PROBLEM — N20.0 KIDNEY STONE: Status: ACTIVE | Noted: 2018-12-17

## 2020-04-23 RX ORDER — METOPROLOL SUCCINATE 25 MG/1
25 TABLET, EXTENDED RELEASE ORAL DAILY
Qty: 90 TABLET | Refills: 0 | Status: SHIPPED | OUTPATIENT
Start: 2020-04-23 | End: 2020-06-12 | Stop reason: SDUPTHER

## 2020-06-12 ENCOUNTER — OFFICE VISIT (OUTPATIENT)
Dept: CARDIOLOGY | Facility: CLINIC | Age: 75
End: 2020-06-12
Payer: MEDICARE

## 2020-06-12 VITALS
SYSTOLIC BLOOD PRESSURE: 132 MMHG | WEIGHT: 164.69 LBS | OXYGEN SATURATION: 99 % | DIASTOLIC BLOOD PRESSURE: 72 MMHG | HEIGHT: 67 IN | BODY MASS INDEX: 25.85 KG/M2 | HEART RATE: 81 BPM

## 2020-06-12 DIAGNOSIS — I65.22 CAROTID STENOSIS, ASYMPTOMATIC, LEFT: ICD-10-CM

## 2020-06-12 DIAGNOSIS — I34.0 NON-RHEUMATIC MITRAL REGURGITATION: ICD-10-CM

## 2020-06-12 DIAGNOSIS — I10 ESSENTIAL HYPERTENSION: Primary | ICD-10-CM

## 2020-06-12 DIAGNOSIS — I49.49 PREMATURE BEATS: ICD-10-CM

## 2020-06-12 PROCEDURE — 99214 PR OFFICE/OUTPT VISIT, EST, LEVL IV, 30-39 MIN: ICD-10-PCS | Mod: S$GLB,,, | Performed by: INTERNAL MEDICINE

## 2020-06-12 PROCEDURE — 99999 PR PBB SHADOW E&M-EST. PATIENT-LVL IV: ICD-10-PCS | Mod: PBBFAC,,, | Performed by: INTERNAL MEDICINE

## 2020-06-12 PROCEDURE — 99214 OFFICE O/P EST MOD 30 MIN: CPT | Mod: S$GLB,,, | Performed by: INTERNAL MEDICINE

## 2020-06-12 PROCEDURE — 99999 PR PBB SHADOW E&M-EST. PATIENT-LVL IV: CPT | Mod: PBBFAC,,, | Performed by: INTERNAL MEDICINE

## 2020-06-12 RX ORDER — METOPROLOL SUCCINATE 25 MG/1
25 TABLET, EXTENDED RELEASE ORAL DAILY
Qty: 90 TABLET | Refills: 1 | Status: SHIPPED | OUTPATIENT
Start: 2020-06-12 | End: 2021-04-19

## 2020-06-12 NOTE — PROGRESS NOTES
Subjective:    Patient ID:  Tayler Dominguez is a 74 y.o. female who presents for Hyperlipidemia (LABS PCP), Hypertension, and Valvular Heart Disease        HPI  LAST LABS DEC, DOING WELL, NO CHEST PAIN NO SHORTNESS OF BREATH NO TIA TYPE SYMPTOMS NO NEAR-SYNCOPE, SEE ROS    Past Medical History:   Diagnosis Date    Allergy     Edema     Heart murmur     MVP    Hypertension     Menopause     Migraine headache     Mitral regurgitation     Sinusitis     Supraventricular tachycardia      Past Surgical History:   Procedure Laterality Date    ABDOMINAL SURGERY      APPENDECTOMY      BREAST BIOPSY      CARDIAC CATHETERIZATION  2013    COLONOSCOPY  11/15/2013    Done by Dr. ELTON Lawrence -- report in paper chart    HYSTERECTOMY      RAD[facility]RE[facility]BL[facility]  2013    at [facility]     Family History   Problem Relation Age of Onset    Cancer Mother     Aneurysm Mother     Hypertension Mother     Heart disease Father     Hyperlipidemia Father     Hypertension Father     Diabetes Father      Social History     Socioeconomic History    Marital status:      Spouse name: Not on file    Number of children: Not on file    Years of education: Not on file    Highest education level: Not on file   Occupational History    Not on file   Social Needs    Financial resource strain: Not on file    Food insecurity     Worry: Not on file     Inability: Not on file    Transportation needs     Medical: Not on file     Non-medical: Not on file   Tobacco Use    Smoking status: Never Smoker    Smokeless tobacco: Never Used   Substance and Sexual Activity    Alcohol use: No    Drug use: No    Sexual activity: Not on file   Lifestyle    Physical activity     Days per week: Not on file     Minutes per session: Not on file    Stress: Not on file   Relationships    Social connections     Talks on phone: Not on file     Gets together: Not on file     Attends Druze service: Not on file     Active member of club or  organization: Not on file     Attends meetings of clubs or organizations: Not on file     Relationship status: Not on file   Other Topics Concern    Not on file   Social History Narrative    Not on file       Review of patient's allergies indicates:   Allergen Reactions    Ambien [zolpidem]     Cephalosporins     Cleocin [clindamycin hcl]     Codeine     Erythropoietin analogues     Levaquin [levofloxacin]     Macrobid [nitrofurantoin monohyd/m-cryst]     Morphine     Pcn [penicillins]     Sudal [phenylephrine-pot guaiacolsulf]     Sulfa (sulfonamide antibiotics)        Current Outpatient Medications:     alendronate (FOSAMAX) 70 MG tablet, Take 1 tablet (70 mg total) by mouth every 7 days., Disp: 4 tablet, Rfl: 11    aspirin (ECOTRIN) 81 MG EC tablet, Take 81 mg by mouth once daily., Disp: , Rfl:     baclofen (LIORESAL) 10 MG tablet, Take 1 tablet (10 mg total) by mouth 3 (three) times daily., Disp: 30 tablet, Rfl: 2    butalbital-aspirin-caffeine -40 mg (FIORINAL) -40 mg Cap, Take 1 capsule by mouth every 4 (four) hours as needed., Disp: 45 capsule, Rfl: 2    diltiaZEM HCl (TIAZAC) 120 mg 24 hr capsule, Take 1 capsule (120 mg total) by mouth once daily., Disp: 90 capsule, Rfl: 3    diphenoxylate-atropine 2.5-0.025 mg (LOMOTIL) 2.5-0.025 mg per tablet, Take 1 tablet by mouth 4 (four) times daily as needed for Diarrhea., Disp: 30 tablet, Rfl: 1    estradiol (ESTRACE) 2 MG tablet, TAKE ONE TABLET BY MOUTH ONCE DAILY., Disp: 90 tablet, Rfl: 4    furosemide (LASIX) 20 MG tablet, Take 1 tablet (20 mg total) by mouth 2 (two) times daily., Disp: 60 tablet, Rfl: 11    ketoconazole (NIZORAL) 2 % shampoo, APPLY TO AFFECTED AREA EXTERNALLY AS DIRECTED 3 TIMES A WEEK, Disp: , Rfl: 6    levocetirizine (XYZAL) 5 MG tablet, Take 1 tablet (5 mg total) by mouth every evening., Disp: 90 tablet, Rfl: 3    meloxicam (MOBIC) 7.5 MG tablet, Take 1 tablet (7.5 mg total) by mouth once daily. (Patient  taking differently: Take 7.5 mg by mouth as needed. ), Disp: 20 tablet, Rfl: 1    metoprolol succinate (TOPROL-XL) 25 MG 24 hr tablet, Take 1 tablet (25 mg total) by mouth once daily., Disp: 90 tablet, Rfl: 1    ondansetron (ZOFRAN) 4 MG tablet, Take 1 tablet (4 mg total) by mouth every 8 (eight) hours as needed for Nausea., Disp: 25 tablet, Rfl: 2    valACYclovir (VALTREX) 500 MG tablet, Take 1 tablet (500 mg total) by mouth 2 (two) times daily., Disp: 10 tablet, Rfl: 1    predniSONE (DELTASONE) 10 MG tablet, Take 1 tablet (10 mg total) by mouth once daily., Disp: 10 tablet, Rfl: 2    promethazine (PHENERGAN) 25 MG tablet, Take 25 mg by mouth every 6 (six) hours as needed for Nausea., Disp: , Rfl:     Review of Systems   Constitution: Negative for chills, diaphoresis, fever, malaise/fatigue and night sweats. Weight gain: SOME.   HENT: Negative for congestion and nosebleeds.    Eyes: Negative for blurred vision and visual disturbance.   Cardiovascular: Negative for chest pain, claudication, cyanosis, dyspnea on exertion (MILD), irregular heartbeat (OCC), leg swelling (OCC), near-syncope, orthopnea, palpitations, paroxysmal nocturnal dyspnea and syncope.   Respiratory: Negative for cough, hemoptysis, shortness of breath and wheezing.    Endocrine: Negative for cold intolerance and heat intolerance.   Hematologic/Lymphatic: Negative for adenopathy. Does not bruise/bleed easily.   Skin: Negative for color change, itching and rash.   Musculoskeletal: Negative for back pain (OCC) and falls. Joint pain: KNEES. Neck pain: IN PT.   Gastrointestinal: Negative for abdominal pain, change in bowel habit, dysphagia, heartburn, hematemesis, jaundice, melena and vomiting.   Genitourinary: Negative for dysuria, flank pain and frequency.   Neurological: Negative for brief paralysis, dizziness, focal weakness, loss of balance, numbness, tremors and weakness.   Psychiatric/Behavioral: Negative for altered mental status and  "depression. The patient is not nervous/anxious.    Allergic/Immunologic: Negative for hives and persistent infections.        Objective:      Vitals:    06/12/20 1149   BP: 132/72   Pulse: 81   SpO2: 99%   Weight: 74.7 kg (164 lb 10.9 oz)   Height: 5' 7" (1.702 m)   PainSc: 0-No pain     Body mass index is 25.79 kg/m².    Physical Exam   Constitutional: She is oriented to person, place, and time. She appears well-developed and well-nourished. She is active.   HENT:   Head: Normocephalic and atraumatic.   Mouth/Throat: Oropharynx is clear and moist and mucous membranes are normal.   Eyes: Pupils are equal, round, and reactive to light. Conjunctivae and EOM are normal.   Neck: Trachea normal and normal range of motion. Neck supple. Normal carotid pulses, no hepatojugular reflux and no JVD present. Carotid bruit is not present. No edema and no erythema present. No thyromegaly present.   Cardiovascular: Normal rate and regular rhythm.  No extrasystoles are present. PMI is not displaced. Exam reveals no gallop and no friction rub.   Murmur heard.  High-pitched blowing holosystolic murmur is present with a grade of 2/6 at the apex.  Pulses:       Carotid pulses are 2+ on the right side, and 2+ on the left side.       Radial pulses are 2+ on the right side, and 2+ on the left side.        Femoral pulses are 2+ on the right side, and 2+ on the left side.       Dorsalis pedis pulses are 2+ on the right side, and 2+ on the left side.        Posterior tibial pulses are 2+ on the right side, and 2+ on the left side.   Pulmonary/Chest: Effort normal and breath sounds normal. No tachypnea and no bradypnea. She has no wheezes. She has no rales. She exhibits no tenderness.   Abdominal: Soft. Bowel sounds are normal. She exhibits no mass. There is no hepatosplenomegaly. There is no tenderness. There is no CVA tenderness.   Musculoskeletal: Normal range of motion. She exhibits no edema.   NO EDEMA,  VARICOSE VEINS BY ANKLES,  "   Lymphadenopathy:     She has no cervical adenopathy.   Neurological: She is alert and oriented to person, place, and time. She has normal strength. She displays no tremor. No cranial nerve deficit.   Skin: Skin is warm and dry. No cyanosis or erythema.   Psychiatric: She has a normal mood and affect. Her speech is normal and behavior is normal.               ..    Chemistry        Component Value Date/Time     02/07/2019 1352    K 4.1 02/07/2019 1352     02/07/2019 1352    CO2 27 02/07/2019 1352    BUN 22 02/07/2019 1352    CREATININE 0.7 02/07/2019 1352     (H) 02/07/2019 1352        Component Value Date/Time    CALCIUM 9.5 02/07/2019 1352    ALKPHOS 66 01/30/2018 0920    AST 22 01/30/2018 0920    AST 37 (H) 04/18/2016 2014    ALT 13 01/30/2018 0920    BILITOT 0.3 01/30/2018 0920    ESTGFRAFRICA >60.0 02/07/2019 1352    EGFRNONAA >60.0 02/07/2019 1352            ..  Lab Results   Component Value Date    CHOL 179 09/30/2016     Lab Results   Component Value Date    HDL 78 (H) 09/30/2016     Lab Results   Component Value Date    LDLCALC 81.0 09/30/2016     Lab Results   Component Value Date    TRIG 100 09/30/2016     Lab Results   Component Value Date    CHOLHDL 43.6 09/30/2016     ..  Lab Results   Component Value Date    WBC 6.70 01/30/2018    HGB 12.6 01/30/2018    HCT 39.5 01/30/2018    MCV 95 01/30/2018     01/30/2018       Test(s) Reviewed  I have reviewed the following in detail:  [] Stress test   [] Angiography   [] Echocardiogram   [x] Labs   [] Other:       Assessment:         ICD-10-CM ICD-9-CM   1. Essential hypertension  I10 401.9   2. Non-rheumatic mitral regurgitation  I34.0 424.0   3. Carotid stenosis, asymptomatic, left  I65.22 433.10   4. Premature beats  I49.49 427.60     Problem List Items Addressed This Visit        Cardiac/Vascular    Non-rheumatic mitral regurgitation    Premature beats    Essential hypertension - Primary    Carotid stenosis, asymptomatic, left     Relevant Orders    CV Ultrasound Bilateral Doppler Carotid           Plan:     ALL CV CLINICALLY STABLE, NO ANGINA, NO HF, NO TIA, NO CLINICAL ARRHYTHMIA,CONTINUE CURRENT MEDS, EDUCATION, DIET, EXERCISE, LABS WITH PCP, RETURN TO CLINIC IN 6 MO WITH CAROTID ULTRASOUND      Essential hypertension    Non-rheumatic mitral regurgitation    Carotid stenosis, asymptomatic, left  -     CV Ultrasound Bilateral Doppler Carotid; Future    Premature beats    Other orders  -     metoprolol succinate (TOPROL-XL) 25 MG 24 hr tablet; Take 1 tablet (25 mg total) by mouth once daily.  Dispense: 90 tablet; Refill: 1    RTC Low level/low impact aerobic exercise 5x's/wk. Heart healthy diet and risk factor modification.    See labs and med orders.    Aerobic exercise 5x's/wk. Heart healthy diet and risk factor modification.    See labs and med orders.

## 2020-08-13 ENCOUNTER — OFFICE VISIT (OUTPATIENT)
Dept: CARDIOLOGY | Facility: CLINIC | Age: 75
End: 2020-08-13
Payer: MEDICARE

## 2020-08-13 VITALS
HEIGHT: 67 IN | DIASTOLIC BLOOD PRESSURE: 84 MMHG | SYSTOLIC BLOOD PRESSURE: 154 MMHG | WEIGHT: 166.88 LBS | HEART RATE: 76 BPM | BODY MASS INDEX: 26.19 KG/M2

## 2020-08-13 DIAGNOSIS — I20.0 CRESCENDO ANGINA: ICD-10-CM

## 2020-08-13 DIAGNOSIS — I10 ESSENTIAL HYPERTENSION: ICD-10-CM

## 2020-08-13 DIAGNOSIS — R55 NEAR SYNCOPE: Primary | ICD-10-CM

## 2020-08-13 DIAGNOSIS — Z03.818 ENCNTR FOR OBS FOR SUSP EXPSR TO OTH BIOLG AGENTS RULED OUT: ICD-10-CM

## 2020-08-13 DIAGNOSIS — I34.0 NON-RHEUMATIC MITRAL REGURGITATION: ICD-10-CM

## 2020-08-13 DIAGNOSIS — R06.02 SOB (SHORTNESS OF BREATH): ICD-10-CM

## 2020-08-13 PROBLEM — R07.2 PRECORDIAL PAIN: Status: ACTIVE | Noted: 2020-08-13

## 2020-08-13 PROCEDURE — 99999 PR PBB SHADOW E&M-EST. PATIENT-LVL IV: ICD-10-PCS | Mod: PBBFAC,,, | Performed by: INTERNAL MEDICINE

## 2020-08-13 PROCEDURE — 3008F BODY MASS INDEX DOCD: CPT | Mod: CPTII,S$GLB,, | Performed by: INTERNAL MEDICINE

## 2020-08-13 PROCEDURE — 1101F PR PT FALLS ASSESS DOC 0-1 FALLS W/OUT INJ PAST YR: ICD-10-PCS | Mod: CPTII,S$GLB,, | Performed by: INTERNAL MEDICINE

## 2020-08-13 PROCEDURE — 99214 OFFICE O/P EST MOD 30 MIN: CPT | Mod: S$GLB,,, | Performed by: INTERNAL MEDICINE

## 2020-08-13 PROCEDURE — 3008F PR BODY MASS INDEX (BMI) DOCUMENTED: ICD-10-PCS | Mod: CPTII,S$GLB,, | Performed by: INTERNAL MEDICINE

## 2020-08-13 PROCEDURE — 1159F MED LIST DOCD IN RCRD: CPT | Mod: S$GLB,,, | Performed by: INTERNAL MEDICINE

## 2020-08-13 PROCEDURE — 99999 PR PBB SHADOW E&M-EST. PATIENT-LVL IV: CPT | Mod: PBBFAC,,, | Performed by: INTERNAL MEDICINE

## 2020-08-13 PROCEDURE — 1126F PR PAIN SEVERITY QUANTIFIED, NO PAIN PRESENT: ICD-10-PCS | Mod: S$GLB,,, | Performed by: INTERNAL MEDICINE

## 2020-08-13 PROCEDURE — 1101F PT FALLS ASSESS-DOCD LE1/YR: CPT | Mod: CPTII,S$GLB,, | Performed by: INTERNAL MEDICINE

## 2020-08-13 PROCEDURE — 1126F AMNT PAIN NOTED NONE PRSNT: CPT | Mod: S$GLB,,, | Performed by: INTERNAL MEDICINE

## 2020-08-13 PROCEDURE — 3079F PR MOST RECENT DIASTOLIC BLOOD PRESSURE 80-89 MM HG: ICD-10-PCS | Mod: CPTII,S$GLB,, | Performed by: INTERNAL MEDICINE

## 2020-08-13 PROCEDURE — 93000 EKG 12-LEAD: ICD-10-PCS | Mod: S$GLB,,, | Performed by: INTERNAL MEDICINE

## 2020-08-13 PROCEDURE — 3077F SYST BP >= 140 MM HG: CPT | Mod: CPTII,S$GLB,, | Performed by: INTERNAL MEDICINE

## 2020-08-13 PROCEDURE — 99214 PR OFFICE/OUTPT VISIT, EST, LEVL IV, 30-39 MIN: ICD-10-PCS | Mod: S$GLB,,, | Performed by: INTERNAL MEDICINE

## 2020-08-13 PROCEDURE — 1159F PR MEDICATION LIST DOCUMENTED IN MEDICAL RECORD: ICD-10-PCS | Mod: S$GLB,,, | Performed by: INTERNAL MEDICINE

## 2020-08-13 PROCEDURE — 3077F PR MOST RECENT SYSTOLIC BLOOD PRESSURE >= 140 MM HG: ICD-10-PCS | Mod: CPTII,S$GLB,, | Performed by: INTERNAL MEDICINE

## 2020-08-13 PROCEDURE — 3079F DIAST BP 80-89 MM HG: CPT | Mod: CPTII,S$GLB,, | Performed by: INTERNAL MEDICINE

## 2020-08-13 PROCEDURE — 93000 ELECTROCARDIOGRAM COMPLETE: CPT | Mod: S$GLB,,, | Performed by: INTERNAL MEDICINE

## 2020-08-13 RX ORDER — SODIUM CHLORIDE 0.9 % (FLUSH) 0.9 %
10 SYRINGE (ML) INJECTION
Status: DISCONTINUED | OUTPATIENT
Start: 2020-08-13 | End: 2021-11-12

## 2020-08-13 RX ORDER — SODIUM CHLORIDE 9 MG/ML
INJECTION, SOLUTION INTRAVENOUS ONCE
Status: CANCELLED | OUTPATIENT
Start: 2020-08-13 | End: 2020-08-13

## 2020-08-13 RX ORDER — NITROGLYCERIN 0.4 MG/1
0.4 TABLET SUBLINGUAL EVERY 5 MIN PRN
Qty: 25 TABLET | Refills: 0 | Status: SHIPPED | OUTPATIENT
Start: 2020-08-13

## 2020-08-13 RX ORDER — DILTIAZEM HYDROCHLORIDE 180 MG/1
180 CAPSULE, COATED, EXTENDED RELEASE ORAL DAILY
Qty: 30 CAPSULE | Refills: 1 | Status: SHIPPED | OUTPATIENT
Start: 2020-08-13 | End: 2020-10-01

## 2020-08-13 NOTE — PATIENT INSTRUCTIONS
Angiogram    Arrive for procedure at: [facility] Monday 8/18/20 @ 7:00AM.  THE PROCEDURE WILL START AT 9AM WITH DR. DIA.      You will receive a phone call from [facility] Pre-Op Department with further instructions prior to your scheduled procedure.    Notify the nurse if you are ALLERGIC TO IODINE.    FASTING: You MAY NOT have anything to eat or drink AFTER MIDNIGHT the day before your procedure. If your procedure is scheduled in the afternoon, you may have a LIGHT BREAKFAST 6-8 hours prior to your procedure.  For example: Two slices of toast; black coffee or black tea.    MEDICATIONS: You may take your regular morning medications with water. If there are any medications that you should not take, you will be instructed to hold them for that morning.    ? CARDIOLOGY PRE-PROCEDURE MEDICATION ORDERS:  ** Please hold any medications that are checked below:    HOLD   # OF DAYS TO HOLD  ? Coumadin   Consult with Coumadin Clinic   ? Xarelto    _DAY BEFORE & DAY OF_  ? Pradaxa  _ DAY BEFORE & DAY OF _  ? Eliquis   _ DAY BEFORE & DAY OF _  ? Metformin    Day before procedure & morning of procedure  ? Short acting insulin   Morning of procedure    CONTINUE the Following Medications   ? Plavix      ? Effient     ? Aspirin    WHAT TO EXPECT:    How long will the procedure take?  The procedure will take an average of 1 - 2 hours to perform.  After the procedure, you will need to lay flat for around 4 - 6 hours to minimize bleeding from the puncture site. If the wrist is accessed you will need to keep your arm still as instructed by the nurse.    When can I go home?  You may be able to be discharged home that same afternoon if there were no complications.  If you have one of the following: balloon; stent; pacemaker or defibrillator procedures, you may spend one night for observation.  Your doctor will determine your discharge based upon your progress.  The results of your procedure will be discussed with you  before you are discharged.  Any further testing or procedures will be scheduled for you either before you leave or you will be instructed to call for a future appointment.      TRANSPORTATION:  PLEASE ARRANGE TO HAVE SOMEONE DRIVE YOU HOME FOLLOWING YOUR PROCEDURE, YOU WILL NOT BE ALLOWED TO DRIVE.

## 2020-08-13 NOTE — PROGRESS NOTES
Subjective:    Patient ID:  Tayler Dominguez is a 74 y.o. female who presents for Valvular Heart Disease (MR episode this weekend ), Chest Pain (Heaviness in the chest for past  four days), and Hypertension        HPI  REQUESTED EARLIER OV, HAD NEAR SYNCOPE WHILE AT HOME DEPO, WITH CHEST HEAVINESS SOME RADIATION BETWEEN SHOULDER BLADES,, STILL WITH SOME EPISODES, PLUS SOB, BP UP, SEE ROS    Past Medical History:   Diagnosis Date    Allergy     Arthritis     hands    Crescendo angina     Edema     Heart murmur     MVP    Hypertension     Kidney stones     Menopause     Migraine headache     Mitral regurgitation     Near syncope     Sinusitis     SOB (shortness of breath)     Supraventricular tachycardia      Past Surgical History:   Procedure Laterality Date    ABDOMINAL SURGERY      APPENDECTOMY      BREAST BIOPSY      CARDIAC CATHETERIZATION  2013    CATARACT EXTRACTION W/  INTRAOCULAR LENS IMPLANT Bilateral     COLONOSCOPY  11/15/2013    Done by Dr. ELTON Lawrence -- report in paper chart    HYSTERECTOMY      RADIOFREQUENCY ABLATION  2013    at [facility]      Family History   Problem Relation Age of Onset    Cancer Mother     Aneurysm Mother     Hypertension Mother     Heart disease Father     Hyperlipidemia Father     Hypertension Father     Diabetes Father      Social History     Socioeconomic History    Marital status:      Spouse name: Not on file    Number of children: Not on file    Years of education: Not on file    Highest education level: Not on file   Occupational History    Not on file   Social Needs    Financial resource strain: Not on file    Food insecurity     Worry: Not on file     Inability: Not on file    Transportation needs     Medical: Not on file     Non-medical: Not on file   Tobacco Use    Smoking status: Never Smoker    Smokeless tobacco: Never Used   Substance and Sexual Activity    Alcohol use: No    Drug use: No    Sexual activity: Not on file    Lifestyle    Physical activity     Days per week: Not on file     Minutes per session: Not on file    Stress: Not on file   Relationships    Social connections     Talks on phone: Not on file     Gets together: Not on file     Attends Confucianism service: Not on file     Active member of club or organization: Not on file     Attends meetings of clubs or organizations: Not on file     Relationship status: Not on file   Other Topics Concern    Not on file   Social History Narrative    Not on file       Review of patient's allergies indicates:   Allergen Reactions    Ambien [zolpidem]     Cephalosporins     Cleocin [clindamycin hcl]     Codeine     Erythropoietin analogues     Levaquin [levofloxacin]     Macrobid [nitrofurantoin monohyd/m-cryst]     Morphine     Pcn [penicillins]     Sudal [phenylephrine-pot guaiacolsulf]     Sulfa (sulfonamide antibiotics)        Current Outpatient Medications:     alendronate (FOSAMAX) 70 MG tablet, Take 1 tablet (70 mg total) by mouth every 7 days., Disp: 4 tablet, Rfl: 11    aspirin (ECOTRIN) 81 MG EC tablet, Take 81 mg by mouth every evening. , Disp: , Rfl:     baclofen (LIORESAL) 10 MG tablet, Take 1 tablet (10 mg total) by mouth 3 (three) times daily. (Patient taking differently: Take 10 mg by mouth 3 (three) times daily as needed. ), Disp: 30 tablet, Rfl: 2    butalbital-aspirin-caffeine -40 mg (FIORINAL) -40 mg Cap, Take 1 capsule by mouth every 4 (four) hours as needed., Disp: 45 capsule, Rfl: 2    diphenoxylate-atropine 2.5-0.025 mg (LOMOTIL) 2.5-0.025 mg per tablet, Take 1 tablet by mouth 4 (four) times daily as needed for Diarrhea., Disp: 30 tablet, Rfl: 1    estradiol (ESTRACE) 2 MG tablet, TAKE ONE TABLET BY MOUTH ONCE DAILY. (Patient taking differently: Take 2 mg by mouth every evening. ), Disp: 90 tablet, Rfl: 4    furosemide (LASIX) 20 MG tablet, Take 1 tablet (20 mg total) by mouth 2 (two) times daily. (Patient not taking: Reported  on 8/14/2020), Disp: 60 tablet, Rfl: 11    ketoconazole (NIZORAL) 2 % shampoo, APPLY TO AFFECTED AREA EXTERNALLY AS DIRECTED 3 TIMES A WEEK, Disp: , Rfl: 6    levocetirizine (XYZAL) 5 MG tablet, TAKE 1 TABLET (5 MG TOTAL) BY MOUTH EVERY EVENING., Disp: 90 tablet, Rfl: 3    meloxicam (MOBIC) 7.5 MG tablet, Take 1 tablet (7.5 mg total) by mouth once daily. (Patient taking differently: Take 7.5 mg by mouth as needed. ), Disp: 20 tablet, Rfl: 1    metoprolol succinate (TOPROL-XL) 25 MG 24 hr tablet, Take 1 tablet (25 mg total) by mouth once daily. (Patient taking differently: Take 25 mg by mouth every evening. ), Disp: 90 tablet, Rfl: 1    ondansetron (ZOFRAN) 4 MG tablet, Take 1 tablet (4 mg total) by mouth every 8 (eight) hours as needed for Nausea., Disp: 25 tablet, Rfl: 2    promethazine (PHENERGAN) 25 MG tablet, Take 25 mg by mouth every 6 (six) hours as needed for Nausea., Disp: , Rfl:     ascorbic acid, vitamin C, (VITAMIN C) 500 MG tablet, Take 500 mg by mouth once daily., Disp: , Rfl:     diltiaZEM (CARDIZEM CD) 180 MG 24 hr capsule, Take 1 capsule (180 mg total) by mouth once daily. (Patient taking differently: Take 180 mg by mouth every evening. ), Disp: 30 capsule, Rfl: 1    nitroGLYCERIN (NITROSTAT) 0.4 MG SL tablet, Place 1 tablet (0.4 mg total) under the tongue every 5 (five) minutes as needed., Disp: 25 tablet, Rfl: 0    predniSONE (DELTASONE) 10 MG tablet, Take 1 tablet (10 mg total) by mouth once daily. (Patient not taking: Reported on 8/13/2020), Disp: 10 tablet, Rfl: 2    valACYclovir (VALTREX) 500 MG tablet, Take 1 tablet (500 mg total) by mouth 2 (two) times daily., Disp: 10 tablet, Rfl: 1    vitamin D (VITAMIN D3) 1000 units Tab, Take 1,000 Units by mouth once daily., Disp: , Rfl:     Current Facility-Administered Medications:     sodium chloride 0.9% flush 10 mL, 10 mL, Intravenous, PRN, Anjelica James MD    Review of Systems   Constitution: Positive for malaise/fatigue. Negative for  "chills, diaphoresis, fever and night sweats.   HENT: Negative for congestion and nosebleeds.    Eyes: Negative for blurred vision and visual disturbance.   Cardiovascular: Positive for chest pain, dyspnea on exertion (MILD) and near-syncope. Negative for claudication, cyanosis, irregular heartbeat (OCC), leg swelling (OCC), orthopnea, palpitations, paroxysmal nocturnal dyspnea and syncope.   Respiratory: Negative for cough, hemoptysis, shortness of breath and wheezing.    Endocrine: Negative for cold intolerance and heat intolerance.   Hematologic/Lymphatic: Negative for adenopathy. Does not bruise/bleed easily.   Skin: Negative for color change, itching and rash.   Musculoskeletal: Negative for back pain (OCC) and falls. Joint pain: KNEES. Neck pain: IN PT.   Gastrointestinal: Negative for abdominal pain, change in bowel habit, dysphagia, heartburn, hematemesis, jaundice, melena and vomiting.   Genitourinary: Negative for dysuria, flank pain and frequency.   Neurological: Negative for brief paralysis, dizziness, focal weakness, loss of balance, numbness, tremors and weakness.   Psychiatric/Behavioral: Negative for altered mental status and depression. The patient is not nervous/anxious.    Allergic/Immunologic: Negative for hives and persistent infections.        Objective:      Vitals:    08/13/20 1415   BP: (!) 154/84   Pulse: 76   Weight: 75.7 kg (166 lb 14.2 oz)   Height: 5' 7" (1.702 m)   PainSc: 0-No pain     Body mass index is 26.14 kg/m².    Physical Exam   Constitutional: She is oriented to person, place, and time. She appears well-developed and well-nourished. She is active.   HENT:   Head: Normocephalic and atraumatic.   Mouth/Throat: Oropharynx is clear and moist and mucous membranes are normal.   Eyes: Pupils are equal, round, and reactive to light. Conjunctivae and EOM are normal.   Neck: Trachea normal and normal range of motion. Neck supple. Normal carotid pulses, no hepatojugular reflux and no JVD " present. Carotid bruit is not present. No edema and no erythema present. No thyromegaly present.   Cardiovascular: Normal rate and regular rhythm.  No extrasystoles are present. PMI is not displaced. Exam reveals no gallop and no friction rub.   Murmur heard.  High-pitched blowing holosystolic murmur is present with a grade of 2/6 at the apex.  Pulses:       Carotid pulses are 2+ on the right side and 2+ on the left side.       Radial pulses are 2+ on the right side and 2+ on the left side.        Femoral pulses are 2+ on the right side and 2+ on the left side.       Dorsalis pedis pulses are 2+ on the right side and 2+ on the left side.        Posterior tibial pulses are 2+ on the right side and 2+ on the left side.   Pulmonary/Chest: Effort normal and breath sounds normal. No tachypnea and no bradypnea. She has no wheezes. She has no rales. She exhibits no tenderness.   Abdominal: Soft. Bowel sounds are normal. She exhibits no mass. There is no hepatosplenomegaly. There is no abdominal tenderness. There is no CVA tenderness.   Musculoskeletal: Normal range of motion.         General: No edema.      Comments: NO EDEMA,  VARICOSE VEINS BY ANKLES,    Lymphadenopathy:     She has no cervical adenopathy.   Neurological: She is alert and oriented to person, place, and time. She has normal strength. She displays no tremor. No cranial nerve deficit.   Skin: Skin is warm and dry. No cyanosis or erythema.   Psychiatric: She has a normal mood and affect. Her speech is normal and behavior is normal.               ..    Chemistry        Component Value Date/Time     02/07/2019 1352    K 4.1 02/07/2019 1352     02/07/2019 1352    CO2 27 02/07/2019 1352    BUN 22 02/07/2019 1352    CREATININE 0.7 02/07/2019 1352     (H) 02/07/2019 1352        Component Value Date/Time    CALCIUM 9.5 02/07/2019 1352    ALKPHOS 66 01/30/2018 0920    AST 22 01/30/2018 0920    AST 37 (H) 04/18/2016 2014    ALT 13 01/30/2018 0920     BILITOT 0.3 01/30/2018 0920    ESTGFRAFRICA >60.0 02/07/2019 1352    EGFRNONAA >60.0 02/07/2019 1352            ..  Lab Results   Component Value Date    CHOL 179 09/30/2016     Lab Results   Component Value Date    HDL 78 (H) 09/30/2016     Lab Results   Component Value Date    LDLCALC 81.0 09/30/2016     Lab Results   Component Value Date    TRIG 100 09/30/2016     Lab Results   Component Value Date    CHOLHDL 43.6 09/30/2016     ..  Lab Results   Component Value Date    WBC 6.70 01/30/2018    HGB 12.6 01/30/2018    HCT 39.5 01/30/2018    MCV 95 01/30/2018     01/30/2018       Test(s) Reviewed  I have reviewed the following in detail:  [] Stress test   [] Angiography   [] Echocardiogram   [] Labs   [x] Other:       Assessment:         ICD-10-CM ICD-9-CM   1. Near syncope  R55 780.2   2. SOB (shortness of breath)  R06.02 786.05   3. Crescendo angina  I20.0 411.1   4. Non-rheumatic mitral regurgitation  I34.0 424.0   5. Essential hypertension  I10 401.9   6. Encntr for obs for susp expsr to oth biolg agents ruled out  Z03.818 V71.83     Problem List Items Addressed This Visit        Cardiac/Vascular    Non-rheumatic mitral regurgitation    Relevant Orders    Echo Color Flow Doppler? Yes    Essential hypertension    Relevant Orders    Echo Color Flow Doppler? Yes       Other    SOB (shortness of breath)    Relevant Orders    Echo Color Flow Doppler? Yes    Case Request-Cath Lab: Left heart cath (Completed)    Full code    Near syncope - Primary    Relevant Orders    Echo Color Flow Doppler? Yes    Case Request-Cath Lab: Left heart cath (Completed)    Full code    Precordial pain      Other Visit Diagnoses     Encntr for obs for susp expsr to oth biolg agents ruled out        Relevant Orders    COVID-19 Routine Screening           Plan:         EKG NORMAL SINUS RHYTHM WITHIN NORMAL LIMITS DURING THE EPISODE, WILL INCREASE DILTIAZEM , PRN SL NTG, CHECK ECHO SOON, REASSESS MR, ALSO NEED ANGIOGRAPHY IN  VIEW OF SYMPTOMS, WILL ARRANGE FOR IT TO BE DONE SOON, RISK AND ALTERNATIVE SEND PATIENT IN DETAILS, INFORMED CONSENT WAS OBTAINED,ALL OTHER CV CLINICALLY STABLE, CLASS 3 ANGINA, NO HF, NO TIA, NO APPARENT CLINICAL ARRHYTHMIA,CONTINUE CURRENT MEDS, EDUCATION, DIET, EXERCISE, IF SYMPTOMS RECUR GO TO THE EMERGENCY ROOM  Near syncope  -     Echo Color Flow Doppler? Yes  -     Case Request-Cath Lab: Left heart cath; Standing  -     Full code; Standing  -     EKG 12-lead    SOB (shortness of breath)  -     Echo Color Flow Doppler? Yes  -     Case Request-Cath Lab: Left heart cath; Standing  -     Full code; Standing    Crescendo angina  -     Echo Color Flow Doppler? Yes  -     Case Request-Cath Lab: Left heart cath; Standing  -     Full code; Standing    Non-rheumatic mitral regurgitation  -     Echo Color Flow Doppler? Yes    Essential hypertension  -     Echo Color Flow Doppler? Yes    Encntr for obs for susp expsr to ot biolg agents ruled out  -     COVID-19 Routine Screening; Future; Expected date: 08/20/2020    Other orders  -     nitroGLYCERIN (NITROSTAT) 0.4 MG SL tablet; Place 1 tablet (0.4 mg total) under the tongue every 5 (five) minutes as needed.  Dispense: 25 tablet; Refill: 0  -     diltiaZEM (CARDIZEM CD) 180 MG 24 hr capsule; Take 1 capsule (180 mg total) by mouth once daily. (Patient taking differently: Take 180 mg by mouth every evening. )  Dispense: 30 capsule; Refill: 1  -     sodium chloride 0.9% flush 10 mL    RTC Low level/low impact aerobic exercise 5x's/wk. Heart healthy diet and risk factor modification.    See labs and med orders.    Aerobic exercise 5x's/wk. Heart healthy diet and risk factor modification.    See labs and med orders.

## 2020-08-15 ENCOUNTER — LAB VISIT (OUTPATIENT)
Dept: FAMILY MEDICINE | Facility: CLINIC | Age: 75
End: 2020-08-15
Payer: MEDICARE

## 2020-08-15 DIAGNOSIS — Z03.818 ENCNTR FOR OBS FOR SUSP EXPSR TO OTH BIOLG AGENTS RULED OUT: ICD-10-CM

## 2020-08-15 PROCEDURE — U0003 INFECTIOUS AGENT DETECTION BY NUCLEIC ACID (DNA OR RNA); SEVERE ACUTE RESPIRATORY SYNDROME CORONAVIRUS 2 (SARS-COV-2) (CORONAVIRUS DISEASE [COVID-19]), AMPLIFIED PROBE TECHNIQUE, MAKING USE OF HIGH THROUGHPUT TECHNOLOGIES AS DESCRIBED BY CMS-2020-01-R: HCPCS

## 2020-08-16 LAB — SARS-COV-2 RNA RESP QL NAA+PROBE: NOT DETECTED

## 2020-08-17 ENCOUNTER — TELEPHONE (OUTPATIENT)
Dept: CARDIOLOGY | Facility: CLINIC | Age: 75
End: 2020-08-17

## 2020-08-17 NOTE — TELEPHONE ENCOUNTER
----- Message from Randa Domínguez sent at 8/17/2020 11:43 AM CDT -----  Type:  Test Results    Who Called:  Tayler  Name of Test (Lab/Mammo/Etc):  Covid   Date of Test:  8/15  Ordering Provider:  Dr James  Where the test was performed:  [facility]  Best Call Back Number:  [phone]  Additional Information:  thank you!

## 2020-08-20 ENCOUNTER — TELEPHONE (OUTPATIENT)
Dept: CARDIOLOGY | Facility: CLINIC | Age: 75
End: 2020-08-20

## 2020-08-20 NOTE — TELEPHONE ENCOUNTER
----- Message from Carlin Quinones sent at 8/20/2020  1:11 PM CDT -----  Contact: patient  Patient called in and wanted to know if she still had to have her Echo on 8/28 since she already had her procedure on 8/18/20?    Patient call back number is [phone]

## 2020-08-28 ENCOUNTER — CLINICAL SUPPORT (OUTPATIENT)
Dept: CARDIOLOGY | Facility: CLINIC | Age: 75
End: 2020-08-28
Attending: INTERNAL MEDICINE
Payer: MEDICARE

## 2020-08-28 VITALS — BODY MASS INDEX: 26.06 KG/M2 | WEIGHT: 166 LBS | HEIGHT: 67 IN

## 2020-08-28 PROCEDURE — 99999 PR PBB SHADOW E&M-EST. PATIENT-LVL I: ICD-10-PCS | Mod: PBBFAC,,,

## 2020-08-28 PROCEDURE — 93306 ECHO (CUPID ONLY): ICD-10-PCS | Mod: S$GLB,,, | Performed by: INTERNAL MEDICINE

## 2020-08-28 PROCEDURE — 93306 TTE W/DOPPLER COMPLETE: CPT | Mod: S$GLB,,, | Performed by: INTERNAL MEDICINE

## 2020-08-28 PROCEDURE — 99999 PR PBB SHADOW E&M-EST. PATIENT-LVL I: CPT | Mod: PBBFAC,,,

## 2020-08-31 LAB
ASCENDING AORTA: 2.9 CM
AV INDEX (PROSTH): 0.92
AV MEAN GRADIENT: 3 MMHG
AV PEAK GRADIENT: 5 MMHG
AV VALVE AREA: 3.95 CM2
AV VELOCITY RATIO: 0.84
BSA FOR ECHO PROCEDURE: 1.89 M2
CV ECHO LV RWT: 0.44 CM
DOP CALC AO PEAK VEL: 1.17 M/S
DOP CALC AO VTI: 21.61 CM
DOP CALC LVOT AREA: 4.3 CM2
DOP CALC LVOT DIAMETER: 2.34 CM
DOP CALC LVOT PEAK VEL: 0.98 M/S
DOP CALC LVOT STROKE VOLUME: 85.41 CM3
DOP CALCLVOT PEAK VEL VTI: 19.87 CM
E WAVE DECELERATION TIME: 126.76 MSEC
E/A RATIO: 1.15
E/E' RATIO: 11.86 M/S
ECHO LV POSTERIOR WALL: 1 CM (ref 0.6–1.1)
FRACTIONAL SHORTENING: 41 % (ref 28–44)
INTERVENTRICULAR SEPTUM: 1.02 CM (ref 0.6–1.1)
LA MAJOR: 4.42 CM
LA MINOR: 4.1 CM
LA WIDTH: 4.54 CM
LEFT ATRIUM SIZE: 3.6 CM
LEFT ATRIUM VOLUME INDEX: 31.6 ML/M2
LEFT ATRIUM VOLUME: 59.1 CM3
LEFT INTERNAL DIMENSION IN SYSTOLE: 2.68 CM (ref 2.1–4)
LEFT VENTRICLE DIASTOLIC VOLUME INDEX: 50.38 ML/M2
LEFT VENTRICLE DIASTOLIC VOLUME: 94.12 ML
LEFT VENTRICLE MASS INDEX: 84 G/M2
LEFT VENTRICLE SYSTOLIC VOLUME INDEX: 14.2 ML/M2
LEFT VENTRICLE SYSTOLIC VOLUME: 26.47 ML
LEFT VENTRICULAR INTERNAL DIMENSION IN DIASTOLE: 4.53 CM (ref 3.5–6)
LEFT VENTRICULAR MASS: 157.06 G
LV LATERAL E/E' RATIO: 10.38 M/S
LV SEPTAL E/E' RATIO: 13.83 M/S
MV PEAK A VEL: 0.72 M/S
MV PEAK E VEL: 0.83 M/S
PISA MRMAX VEL: 0.05 M/S
PISA TR MAX VEL: 2.29 M/S
PULM VEIN S/D RATIO: 1.22
PV PEAK D VEL: 0.36 M/S
PV PEAK S VEL: 0.44 M/S
RA MAJOR: 4.16 CM
RA PRESSURE: 3 MMHG
RA WIDTH: 2.42 CM
RIGHT VENTRICULAR END-DIASTOLIC DIMENSION: 3.02 CM
SINUS: 3.16 CM
STJ: 2.94 CM
TDI LATERAL: 0.08 M/S
TDI SEPTAL: 0.06 M/S
TDI: 0.07 M/S
TR MAX PG: 21 MMHG
TRICUSPID ANNULAR PLANE SYSTOLIC EXCURSION: 2 CM
TV REST PULMONARY ARTERY PRESSURE: 24 MMHG

## 2020-09-02 ENCOUNTER — TELEPHONE (OUTPATIENT)
Dept: CARDIOLOGY | Facility: CLINIC | Age: 75
End: 2020-09-02

## 2020-09-02 NOTE — TELEPHONE ENCOUNTER
----- Message from [facility] sent at 9/2/2020 11:59 AM CDT -----  Pt called to get the results of her test please reach out to pt at [phone]

## 2020-12-07 ENCOUNTER — CLINICAL SUPPORT (OUTPATIENT)
Dept: CARDIOLOGY | Facility: CLINIC | Age: 75
End: 2020-12-07
Attending: INTERNAL MEDICINE
Payer: MEDICARE

## 2020-12-07 DIAGNOSIS — I65.22 CAROTID STENOSIS, ASYMPTOMATIC, LEFT: ICD-10-CM

## 2020-12-07 LAB
LEFT ARM DIASTOLIC BLOOD PRESSURE: 80 MMHG
LEFT ARM SYSTOLIC BLOOD PRESSURE: 136 MMHG
LEFT CBA DIAS: 21 CM/S
LEFT CBA SYS: 70 CM/S
LEFT CCA DIST DIAS: 29 CM/S
LEFT CCA DIST SYS: 75 CM/S
LEFT CCA MID DIAS: 27 CM/S
LEFT CCA MID SYS: 84 CM/S
LEFT CCA PROX DIAS: 26 CM/S
LEFT CCA PROX SYS: 89 CM/S
LEFT ECA DIAS: 12 CM/S
LEFT ECA SYS: 62 CM/S
LEFT ICA DIST DIAS: 43 CM/S
LEFT ICA DIST SYS: 102 CM/S
LEFT ICA MID DIAS: 40 CM/S
LEFT ICA MID SYS: 104 CM/S
LEFT ICA PROX DIAS: 28 CM/S
LEFT ICA PROX SYS: 78 CM/S
LEFT VERTEBRAL DIAS: 25 CM/S
LEFT VERTEBRAL SYS: 75 CM/S
OHS CV CAROTID RIGHT ICA EDV HIGHEST: 31
OHS CV CAROTID ULTRASOUND LEFT ICA/CCA RATIO: 1.39
OHS CV CAROTID ULTRASOUND RIGHT ICA/CCA RATIO: 1.28
OHS CV PV CAROTID LEFT HIGHEST CCA: 89
OHS CV PV CAROTID LEFT HIGHEST ICA: 104
OHS CV PV CAROTID RIGHT HIGHEST CCA: 82
OHS CV PV CAROTID RIGHT HIGHEST ICA: 92
OHS CV US CAROTID LEFT HIGHEST EDV: 43
RIGHT ARM DIASTOLIC BLOOD PRESSURE: 80 MMHG
RIGHT ARM SYSTOLIC BLOOD PRESSURE: 136 MMHG
RIGHT CBA DIAS: 21 CM/S
RIGHT CBA SYS: 58 CM/S
RIGHT CCA DIST DIAS: 26 CM/S
RIGHT CCA DIST SYS: 72 CM/S
RIGHT CCA MID DIAS: 26 CM/S
RIGHT CCA MID SYS: 82 CM/S
RIGHT CCA PROX DIAS: 23 CM/S
RIGHT CCA PROX SYS: 75 CM/S
RIGHT ECA DIAS: 12 CM/S
RIGHT ECA SYS: 66 CM/S
RIGHT ICA DIST DIAS: 31 CM/S
RIGHT ICA DIST SYS: 92 CM/S
RIGHT ICA MID DIAS: 20 CM/S
RIGHT ICA MID SYS: 74 CM/S
RIGHT ICA PROX DIAS: 24 CM/S
RIGHT ICA PROX SYS: 76 CM/S
RIGHT VERTEBRAL DIAS: 18 CM/S
RIGHT VERTEBRAL SYS: 63 CM/S

## 2020-12-07 PROCEDURE — 93880 EXTRACRANIAL BILAT STUDY: CPT | Mod: S$GLB,,, | Performed by: INTERNAL MEDICINE

## 2020-12-07 PROCEDURE — 93880 CV US DOPPLER CAROTID (CUPID ONLY): ICD-10-PCS | Mod: S$GLB,,, | Performed by: INTERNAL MEDICINE

## 2021-01-26 PROBLEM — Z86.16 HISTORY OF 2019 NOVEL CORONAVIRUS DISEASE (COVID-19): Status: ACTIVE | Noted: 2021-01-26

## 2021-11-17 ENCOUNTER — LAB VISIT (OUTPATIENT)
Dept: FAMILY MEDICINE | Facility: CLINIC | Age: 76
End: 2021-11-17
Payer: MEDICARE

## 2021-11-17 DIAGNOSIS — I10 ESSENTIAL HYPERTENSION: ICD-10-CM

## 2021-11-17 DIAGNOSIS — M75.51 ACUTE SHOULDER BURSITIS, RIGHT: ICD-10-CM

## 2021-11-17 DIAGNOSIS — R00.0 TACHYCARDIA: ICD-10-CM

## 2021-11-17 DIAGNOSIS — R23.8 RASH, VESICULAR: ICD-10-CM

## 2021-11-24 ENCOUNTER — LAB VISIT (OUTPATIENT)
Dept: FAMILY MEDICINE | Facility: CLINIC | Age: 76
End: 2021-11-24
Payer: MEDICARE

## 2021-11-24 DIAGNOSIS — I10 ESSENTIAL HYPERTENSION: ICD-10-CM

## 2021-11-24 DIAGNOSIS — R00.0 TACHYCARDIA: ICD-10-CM

## 2021-11-24 DIAGNOSIS — M75.51 ACUTE SHOULDER BURSITIS, RIGHT: ICD-10-CM

## 2021-11-24 DIAGNOSIS — R23.8 RASH, VESICULAR: ICD-10-CM

## 2021-11-24 PROCEDURE — 85025 COMPLETE CBC W/AUTO DIFF WBC: CPT | Performed by: FAMILY MEDICINE

## 2021-11-24 PROCEDURE — 80061 LIPID PANEL: CPT | Performed by: FAMILY MEDICINE

## 2021-11-24 PROCEDURE — 80053 COMPREHEN METABOLIC PANEL: CPT | Performed by: FAMILY MEDICINE

## 2021-11-25 LAB
ALBUMIN SERPL BCP-MCNC: 3.5 G/DL (ref 3.5–5.2)
ALP SERPL-CCNC: 62 U/L (ref 55–135)
ALT SERPL W/O P-5'-P-CCNC: 12 U/L (ref 10–44)
ANION GAP SERPL CALC-SCNC: 8 MMOL/L (ref 8–16)
AST SERPL-CCNC: 17 U/L (ref 10–40)
BASOPHILS # BLD AUTO: 0.06 K/UL (ref 0–0.2)
BASOPHILS NFR BLD: 0.7 % (ref 0–1.9)
BILIRUB SERPL-MCNC: 0.4 MG/DL (ref 0.1–1)
BUN SERPL-MCNC: 22 MG/DL (ref 8–23)
CALCIUM SERPL-MCNC: 9.5 MG/DL (ref 8.7–10.5)
CHLORIDE SERPL-SCNC: 102 MMOL/L (ref 95–110)
CHOLEST SERPL-MCNC: 183 MG/DL (ref 120–199)
CHOLEST/HDLC SERPL: 2.5 {RATIO} (ref 2–5)
CO2 SERPL-SCNC: 29 MMOL/L (ref 23–29)
CREAT SERPL-MCNC: 0.8 MG/DL (ref 0.5–1.4)
DIFFERENTIAL METHOD: ABNORMAL
EOSINOPHIL # BLD AUTO: 0.1 K/UL (ref 0–0.5)
EOSINOPHIL NFR BLD: 1.3 % (ref 0–8)
ERYTHROCYTE [DISTWIDTH] IN BLOOD BY AUTOMATED COUNT: 12.9 % (ref 11.5–14.5)
EST. GFR  (AFRICAN AMERICAN): >60 ML/MIN/1.73 M^2
EST. GFR  (NON AFRICAN AMERICAN): >60 ML/MIN/1.73 M^2
GLUCOSE SERPL-MCNC: 68 MG/DL (ref 70–110)
HCT VFR BLD AUTO: 40.8 % (ref 37–48.5)
HDLC SERPL-MCNC: 72 MG/DL (ref 40–75)
HDLC SERPL: 39.3 % (ref 20–50)
HGB BLD-MCNC: 12.8 G/DL (ref 12–16)
IMM GRANULOCYTES # BLD AUTO: 0.03 K/UL (ref 0–0.04)
IMM GRANULOCYTES NFR BLD AUTO: 0.3 % (ref 0–0.5)
LDLC SERPL CALC-MCNC: 82.8 MG/DL (ref 63–159)
LYMPHOCYTES # BLD AUTO: 1.6 K/UL (ref 1–4.8)
LYMPHOCYTES NFR BLD: 17.8 % (ref 18–48)
MCH RBC QN AUTO: 30.5 PG (ref 27–31)
MCHC RBC AUTO-ENTMCNC: 31.4 G/DL (ref 32–36)
MCV RBC AUTO: 97 FL (ref 82–98)
MONOCYTES # BLD AUTO: 0.8 K/UL (ref 0.3–1)
MONOCYTES NFR BLD: 8.8 % (ref 4–15)
NEUTROPHILS # BLD AUTO: 6.4 K/UL (ref 1.8–7.7)
NEUTROPHILS NFR BLD: 71.1 % (ref 38–73)
NONHDLC SERPL-MCNC: 111 MG/DL
NRBC BLD-RTO: 0 /100 WBC
PLATELET # BLD AUTO: 248 K/UL (ref 150–450)
PMV BLD AUTO: 11.2 FL (ref 9.2–12.9)
POTASSIUM SERPL-SCNC: 4.5 MMOL/L (ref 3.5–5.1)
PROT SERPL-MCNC: 6.7 G/DL (ref 6–8.4)
RBC # BLD AUTO: 4.2 M/UL (ref 4–5.4)
SODIUM SERPL-SCNC: 139 MMOL/L (ref 136–145)
TRIGL SERPL-MCNC: 141 MG/DL (ref 30–150)
WBC # BLD AUTO: 9.01 K/UL (ref 3.9–12.7)

## 2022-08-19 ENCOUNTER — LAB VISIT (OUTPATIENT)
Dept: FAMILY MEDICINE | Facility: CLINIC | Age: 77
End: 2022-08-19
Payer: MEDICARE

## 2022-08-19 DIAGNOSIS — Z00.00 WELL ADULT EXAM: ICD-10-CM

## 2022-08-19 DIAGNOSIS — I10 ESSENTIAL HYPERTENSION: ICD-10-CM

## 2022-08-19 LAB
ALBUMIN SERPL BCP-MCNC: 3.4 G/DL (ref 3.5–5.2)
ALP SERPL-CCNC: 56 U/L (ref 55–135)
ALT SERPL W/O P-5'-P-CCNC: 11 U/L (ref 10–44)
ANION GAP SERPL CALC-SCNC: 9 MMOL/L (ref 8–16)
AST SERPL-CCNC: 20 U/L (ref 10–40)
BASOPHILS # BLD AUTO: 0.03 K/UL (ref 0–0.2)
BASOPHILS NFR BLD: 0.5 % (ref 0–1.9)
BILIRUB SERPL-MCNC: 0.4 MG/DL (ref 0.1–1)
BUN SERPL-MCNC: 16 MG/DL (ref 8–23)
CALCIUM SERPL-MCNC: 9.2 MG/DL (ref 8.7–10.5)
CHLORIDE SERPL-SCNC: 104 MMOL/L (ref 95–110)
CHOLEST SERPL-MCNC: 218 MG/DL (ref 120–199)
CHOLEST/HDLC SERPL: 2.9 {RATIO} (ref 2–5)
CO2 SERPL-SCNC: 28 MMOL/L (ref 23–29)
CREAT SERPL-MCNC: 0.7 MG/DL (ref 0.5–1.4)
DIFFERENTIAL METHOD: ABNORMAL
EOSINOPHIL # BLD AUTO: 0 K/UL (ref 0–0.5)
EOSINOPHIL NFR BLD: 0.2 % (ref 0–8)
ERYTHROCYTE [DISTWIDTH] IN BLOOD BY AUTOMATED COUNT: 12.8 % (ref 11.5–14.5)
EST. GFR  (NO RACE VARIABLE): >60 ML/MIN/1.73 M^2
GLUCOSE SERPL-MCNC: 108 MG/DL (ref 70–110)
HCT VFR BLD AUTO: 40.3 % (ref 37–48.5)
HDLC SERPL-MCNC: 76 MG/DL (ref 40–75)
HDLC SERPL: 34.9 % (ref 20–50)
HGB BLD-MCNC: 12.9 G/DL (ref 12–16)
IMM GRANULOCYTES # BLD AUTO: 0.02 K/UL (ref 0–0.04)
IMM GRANULOCYTES NFR BLD AUTO: 0.4 % (ref 0–0.5)
LDLC SERPL CALC-MCNC: 120.8 MG/DL (ref 63–159)
LYMPHOCYTES # BLD AUTO: 1 K/UL (ref 1–4.8)
LYMPHOCYTES NFR BLD: 17.8 % (ref 18–48)
MCH RBC QN AUTO: 29.9 PG (ref 27–31)
MCHC RBC AUTO-ENTMCNC: 32 G/DL (ref 32–36)
MCV RBC AUTO: 94 FL (ref 82–98)
MONOCYTES # BLD AUTO: 0.3 K/UL (ref 0.3–1)
MONOCYTES NFR BLD: 5.9 % (ref 4–15)
NEUTROPHILS # BLD AUTO: 4.2 K/UL (ref 1.8–7.7)
NEUTROPHILS NFR BLD: 75.2 % (ref 38–73)
NONHDLC SERPL-MCNC: 142 MG/DL
NRBC BLD-RTO: 0 /100 WBC
PLATELET # BLD AUTO: 266 K/UL (ref 150–450)
PMV BLD AUTO: 10.8 FL (ref 9.2–12.9)
POTASSIUM SERPL-SCNC: 4.4 MMOL/L (ref 3.5–5.1)
PROT SERPL-MCNC: 6.8 G/DL (ref 6–8.4)
RBC # BLD AUTO: 4.31 M/UL (ref 4–5.4)
SODIUM SERPL-SCNC: 141 MMOL/L (ref 136–145)
TRIGL SERPL-MCNC: 106 MG/DL (ref 30–150)
WBC # BLD AUTO: 5.55 K/UL (ref 3.9–12.7)

## 2022-08-19 PROCEDURE — 85025 COMPLETE CBC W/AUTO DIFF WBC: CPT | Performed by: INTERNAL MEDICINE

## 2022-08-19 PROCEDURE — 80053 COMPREHEN METABOLIC PANEL: CPT | Performed by: INTERNAL MEDICINE

## 2022-08-19 PROCEDURE — 80061 LIPID PANEL: CPT | Performed by: INTERNAL MEDICINE

## 2022-10-10 ENCOUNTER — OFFICE VISIT (OUTPATIENT)
Dept: CARDIOLOGY | Facility: CLINIC | Age: 77
End: 2022-10-10
Payer: MEDICARE

## 2022-10-10 VITALS
SYSTOLIC BLOOD PRESSURE: 144 MMHG | WEIGHT: 158.31 LBS | HEIGHT: 67 IN | HEART RATE: 87 BPM | BODY MASS INDEX: 24.85 KG/M2 | DIASTOLIC BLOOD PRESSURE: 82 MMHG

## 2022-10-10 DIAGNOSIS — E78.00 HYPERCHOLESTEROLEMIA: ICD-10-CM

## 2022-10-10 DIAGNOSIS — I34.0 NON-RHEUMATIC MITRAL REGURGITATION: Primary | Chronic | ICD-10-CM

## 2022-10-10 DIAGNOSIS — I65.23 CAROTID ARTERY PLAQUE, BILATERAL: Chronic | ICD-10-CM

## 2022-10-10 DIAGNOSIS — I10 PRIMARY HYPERTENSION: Chronic | ICD-10-CM

## 2022-10-10 DIAGNOSIS — R94.30 ELEVATED LEFT VENTRICULAR END-DIASTOLIC PRESSURE (LVEDP): Chronic | ICD-10-CM

## 2022-10-10 PROCEDURE — 3077F SYST BP >= 140 MM HG: CPT | Mod: CPTII,S$GLB,, | Performed by: INTERNAL MEDICINE

## 2022-10-10 PROCEDURE — 3079F DIAST BP 80-89 MM HG: CPT | Mod: CPTII,S$GLB,, | Performed by: INTERNAL MEDICINE

## 2022-10-10 PROCEDURE — 3077F PR MOST RECENT SYSTOLIC BLOOD PRESSURE >= 140 MM HG: ICD-10-PCS | Mod: CPTII,S$GLB,, | Performed by: INTERNAL MEDICINE

## 2022-10-10 PROCEDURE — 1126F PR PAIN SEVERITY QUANTIFIED, NO PAIN PRESENT: ICD-10-PCS | Mod: CPTII,S$GLB,, | Performed by: INTERNAL MEDICINE

## 2022-10-10 PROCEDURE — 1101F PT FALLS ASSESS-DOCD LE1/YR: CPT | Mod: CPTII,S$GLB,, | Performed by: INTERNAL MEDICINE

## 2022-10-10 PROCEDURE — 99214 OFFICE O/P EST MOD 30 MIN: CPT | Mod: S$GLB,,, | Performed by: INTERNAL MEDICINE

## 2022-10-10 PROCEDURE — 99999 PR PBB SHADOW E&M-EST. PATIENT-LVL II: ICD-10-PCS | Mod: PBBFAC,,, | Performed by: INTERNAL MEDICINE

## 2022-10-10 PROCEDURE — 99999 PR PBB SHADOW E&M-EST. PATIENT-LVL II: CPT | Mod: PBBFAC,,, | Performed by: INTERNAL MEDICINE

## 2022-10-10 PROCEDURE — 3288F PR FALLS RISK ASSESSMENT DOCUMENTED: ICD-10-PCS | Mod: CPTII,S$GLB,, | Performed by: INTERNAL MEDICINE

## 2022-10-10 PROCEDURE — 3008F PR BODY MASS INDEX (BMI) DOCUMENTED: ICD-10-PCS | Mod: CPTII,S$GLB,, | Performed by: INTERNAL MEDICINE

## 2022-10-10 PROCEDURE — 1101F PR PT FALLS ASSESS DOC 0-1 FALLS W/OUT INJ PAST YR: ICD-10-PCS | Mod: CPTII,S$GLB,, | Performed by: INTERNAL MEDICINE

## 2022-10-10 PROCEDURE — 99214 PR OFFICE/OUTPT VISIT, EST, LEVL IV, 30-39 MIN: ICD-10-PCS | Mod: S$GLB,,, | Performed by: INTERNAL MEDICINE

## 2022-10-10 PROCEDURE — 3008F BODY MASS INDEX DOCD: CPT | Mod: CPTII,S$GLB,, | Performed by: INTERNAL MEDICINE

## 2022-10-10 PROCEDURE — 1126F AMNT PAIN NOTED NONE PRSNT: CPT | Mod: CPTII,S$GLB,, | Performed by: INTERNAL MEDICINE

## 2022-10-10 PROCEDURE — 3288F FALL RISK ASSESSMENT DOCD: CPT | Mod: CPTII,S$GLB,, | Performed by: INTERNAL MEDICINE

## 2022-10-10 PROCEDURE — 3079F PR MOST RECENT DIASTOLIC BLOOD PRESSURE 80-89 MM HG: ICD-10-PCS | Mod: CPTII,S$GLB,, | Performed by: INTERNAL MEDICINE

## 2022-10-10 RX ORDER — HYDROCHLOROTHIAZIDE 12.5 MG/1
12.5 TABLET ORAL EVERY OTHER DAY
Qty: 45 TABLET | Refills: 1 | Status: SHIPPED | OUTPATIENT
Start: 2022-10-10 | End: 2023-02-07

## 2022-10-10 NOTE — PROGRESS NOTES
Subjective:    Patient ID:  Tayler Dominguez is a 76 y.o. female who presents for Migraine, NEAR SYNCOPE, and NON-RHEUMATIC MITRAL REGURGITATION        HPI  RECENT LABS NOTED  UP FROM 82, HDL 76, TRIGLYCERIDE 106, CMP AND CBC OK, SON WITH CANCER, RECENT PNEUMONIA, NO F/U SINCE 2020,, NORMAL CATH, LVEDP 22, MILD CAROTID PLAQUE BILATERALLY, MILD TO MOD MR, LESS SOB, SEE ROS    Past Medical History:   Diagnosis Date    Allergy     Arthritis     hands    Crescendo angina     Edema     Heart murmur     MVP    Hypertension     Kidney stones     Menopause     Migraine headache     Mitral regurgitation     Near syncope     Sinusitis     SOB (shortness of breath)     Supraventricular tachycardia      Past Surgical History:   Procedure Laterality Date    ABDOMINAL SURGERY      APPENDECTOMY      BREAST BIOPSY      CARDIAC CATHETERIZATION  2013    CATARACT EXTRACTION W/  INTRAOCULAR LENS IMPLANT Bilateral     COLONOSCOPY  11/15/2013    Done by Dr. ELTON Lawrence -- report in paper chart    CORONARY ANGIOGRAPHY N/A 8/18/2020    Procedure: ANGIOGRAM, CORONARY ARTERY;  Surgeon: Anjelica James MD;  [facility];  Service: Cardiology;  Laterality: N/A;    HYSTERECTOMY      LEFT HEART CATHETERIZATION N/A 8/18/2020    Procedure: Left heart cath;  Surgeon: Anjelica James MD;  Location: [facility] CATH;  Service: Cardiology;  Laterality: N/A;    RADIOFREQUENCY ABLATION  2013    at [facility] heart      Family History   Problem Relation Age of Onset    Cancer Mother     Aneurysm Mother     Hypertension Mother     Heart disease Father     Hyperlipidemia Father     Hypertension Father     Diabetes Father     Kidney disease Brother      Social History     Socioeconomic History    Marital status:    Tobacco Use    Smoking status: Never    Smokeless tobacco: Never   Substance and Sexual Activity    Alcohol use: No    Drug use: No       Review of patient's allergies indicates:   Allergen Reactions    Ambien [zolpidem]     Cephalosporins      Cleocin [clindamycin hcl]     Codeine     Erythropoietin analogues     Influenza vaccine tri-sp 09-10     Levaquin [levofloxacin]     Macrobid [nitrofurantoin monohyd/m-cryst]     Morphine     Pcn [penicillins]     Sudal [phenylephrine-pot guaiacolsulf]     Sulfa (sulfonamide antibiotics)        Current Outpatient Medications:     albuterol (PROVENTIL) 2.5 mg /3 mL (0.083 %) nebulizer solution, Take 3 mLs (2.5 mg total) by nebulization every 6 (six) hours as needed for Wheezing or Shortness of Breath. Rescue, Disp: 3 mL, Rfl: 1    ascorbic acid, vitamin C, (VITAMIN C) 500 MG tablet, Take 500 mg by mouth once daily., Disp: , Rfl:     aspirin (ECOTRIN) 81 MG EC tablet, Take 81 mg by mouth every evening. , Disp: , Rfl:     baclofen (LIORESAL) 10 MG tablet, Take 1 tablet (10 mg total) by mouth 3 (three) times daily., Disp: 30 tablet, Rfl: 2    butalbital-aspirin-caffeine -40 mg (FIORINAL) -40 mg Cap, Take 1 capsule by mouth every 4 (four) hours as needed., Disp: 45 capsule, Rfl: 2    diltiaZEM (CARDIZEM CD) 120 MG Cp24, Take 1 capsule (120 mg total) by mouth once daily., Disp: 90 capsule, Rfl: 3    estradioL (ESTRACE) 2 MG tablet, Take 1 tablet (2 mg total) by mouth once daily., Disp: 90 tablet, Rfl: 4    fluconazole (DIFLUCAN) 150 MG Tab, Take 1 tab and repeat in 3-5 days if needed., Disp: 2 tablet, Rfl: 0    ketoconazole (NIZORAL) 2 % shampoo, APPLY TO AFFECTED AREA EXTERNALLY AS DIRECTED 3 TIMES A WEEK, Disp: , Rfl: 6    levocetirizine (XYZAL) 5 MG tablet, TAKE 1 TABLET (5 MG TOTAL) BY MOUTH EVERY EVENING., Disp: 90 tablet, Rfl: 3    meloxicam (MOBIC) 7.5 MG tablet, Take 1 tablet (7.5 mg total) by mouth once daily., Disp: 20 tablet, Rfl: 1    metoprolol succinate (TOPROL-XL) 25 MG 24 hr tablet, TAKE ONE TABLET BY MOUTH EVERY DAY, Disp: 90 tablet, Rfl: 0    montelukast (SINGULAIR) 10 mg tablet, Take 1 tablet (10 mg total) by mouth nightly., Disp: 90 tablet, Rfl: 0    ondansetron (ZOFRAN) 4 MG tablet, Take  "1 tablet (4 mg total) by mouth every 8 (eight) hours as needed for Nausea., Disp: 25 tablet, Rfl: 2    predniSONE (DELTASONE) 10 MG tablet, Take 1 tablet (10 mg total) by mouth once daily., Disp: 10 tablet, Rfl: 0    promethazine (PHENERGAN) 25 MG tablet, Take 25 mg by mouth every 6 (six) hours as needed for Nausea., Disp: , Rfl:     vitamin D (VITAMIN D3) 1000 units Tab, Take 1,000 Units by mouth once daily., Disp: , Rfl:     hydroCHLOROthiazide (HYDRODIURIL) 12.5 MG Tab, Take 1 tablet (12.5 mg total) by mouth every other day., Disp: 45 tablet, Rfl: 1    nitroGLYCERIN (NITROSTAT) 0.4 MG SL tablet, Place 1 tablet (0.4 mg total) under the tongue every 5 (five) minutes as needed., Disp: 25 tablet, Rfl: 0    Review of Systems   Constitutional: Negative. Negative for chills and fever.   HENT:  Positive for hoarse voice. Negative for congestion and sore throat.    Eyes:  Negative for blurred vision and vision loss in right eye.   Cardiovascular:  Positive for dyspnea on exertion. Negative for chest pain, claudication, cyanosis, irregular heartbeat, leg swelling (OCC), near-syncope, orthopnea, palpitations, paroxysmal nocturnal dyspnea and syncope.   Respiratory:  Negative for cough (RESOLVED), hemoptysis, shortness of breath and wheezing (RESOLVED).    Hematologic/Lymphatic: Negative.    Skin:  Negative for color change and rash.   Musculoskeletal:  Negative for back pain and falls.   Gastrointestinal:  Negative for abdominal pain, dysphagia, jaundice, melena and nausea.   Genitourinary:  Negative for dysuria and flank pain.   Neurological:  Negative for dizziness, focal weakness, light-headedness and loss of balance.   Psychiatric/Behavioral:  Negative for altered mental status and depression. The patient is nervous/anxious.    Allergic/Immunologic: Negative.       Objective:      Vitals:    10/10/22 1559 10/10/22 1610   BP: (!) 158/83 (!) 144/82   Pulse: 87    Weight: 71.8 kg (158 lb 4.6 oz)    Height: 5' 7" (1.702 m)  "   PainSc: 0-No pain      Body mass index is 24.79 kg/m².    Physical Exam  Constitutional:       Appearance: Normal appearance.   HENT:      Head: Normocephalic and atraumatic.   Eyes:      Extraocular Movements: Extraocular movements intact.      Conjunctiva/sclera: Conjunctivae normal.   Neck:      Vascular: No carotid bruit.   Cardiovascular:      Rate and Rhythm: Normal rate and regular rhythm.      Pulses: Normal pulses.           Carotid pulses are 2+ on the right side and 2+ on the left side.       Radial pulses are 2+ on the right side and 2+ on the left side.        Femoral pulses are 2+ on the right side and 2+ on the left side.       Posterior tibial pulses are 2+ on the right side and 2+ on the left side.      Heart sounds: Murmur heard.   Systolic murmur is present with a grade of 2/6 at the lower left sternal border.     No friction rub. No gallop.   Abdominal:      Palpations: Abdomen is soft.      Tenderness: There is no abdominal tenderness.   Musculoskeletal:      Cervical back: Neck supple.      Right lower leg: No edema.      Left lower leg: No edema.   Skin:     Capillary Refill: Capillary refill takes less than 2 seconds.   Neurological:      General: No focal deficit present.      Mental Status: She is alert and oriented to person, place, and time.      Cranial Nerves: Cranial nerves 2-12 are intact. No cranial nerve deficit.   Psychiatric:         Mood and Affect: Mood normal.         Speech: Speech normal.         Behavior: Behavior normal.               ..    Chemistry        Component Value Date/Time     08/19/2022 1000    K 4.4 08/19/2022 1000     08/19/2022 1000    CO2 28 08/19/2022 1000    BUN 16 08/19/2022 1000    CREATININE 0.7 08/19/2022 1000     08/19/2022 1000        Component Value Date/Time    CALCIUM 9.2 08/19/2022 1000    ALKPHOS 56 08/19/2022 1000    AST 20 08/19/2022 1000    AST 37 (H) 04/18/2016 2014    ALT 11 08/19/2022 1000    BILITOT 0.4 08/19/2022 1000     ESTGFRAFRICA >60.0 11/24/2021 1100    EGFRNONAA >60.0 11/24/2021 1100            ..  Lab Results   Component Value Date    CHOL 218 (H) 08/19/2022    CHOL 183 11/24/2021    CHOL 179 10/01/2020     Lab Results   Component Value Date    HDL 76 (H) 08/19/2022    HDL 72 11/24/2021    HDL 71 10/01/2020     Lab Results   Component Value Date    LDLCALC 120.8 08/19/2022    LDLCALC 82.8 11/24/2021    LDLCALC 69.0 10/01/2020     Lab Results   Component Value Date    TRIG 106 08/19/2022    TRIG 141 11/24/2021    TRIG 195 (H) 10/01/2020     Lab Results   Component Value Date    CHOLHDL 34.9 08/19/2022    CHOLHDL 39.3 11/24/2021    CHOLHDL 39.7 10/01/2020     ..  Lab Results   Component Value Date    WBC 5.55 08/19/2022    HGB 12.9 08/19/2022    HCT 40.3 08/19/2022    MCV 94 08/19/2022     08/19/2022       Test(s) Reviewed  I have reviewed the following in detail:  [] Stress test   [] Angiography   [] Echocardiogram   [x] Labs   [] Other:       Assessment:         ICD-10-CM ICD-9-CM   1. Non-rheumatic mitral regurgitation  I34.0 424.0   2. Elevated left ventricular end-diastolic pressure (LVEDP)  R94.30 794.30   3. Carotid artery plaque, bilateral  I65.23 433.10     433.30   4. Hypercholesterolemia  E78.00 272.0   5. Primary hypertension  I10 401.9     Problem List Items Addressed This Visit          Cardiac/Vascular    Non-rheumatic mitral regurgitation - Primary    Elevated left ventricular end-diastolic pressure (LVEDP)    Relevant Orders    Comprehensive Metabolic Panel    Carotid artery plaque, bilateral    Hypercholesterolemia    Relevant Orders    Comprehensive Metabolic Panel     Other Visit Diagnoses       Primary hypertension  (Chronic)       Relevant Orders    Comprehensive Metabolic Panel             Plan:         ADD HCTZ QOD, FOR BLOOD PRESSURE ELEVATED LVEDP, ALL CV CLINICALLY STABLE, NO ANGINA, NO OVERT HF, NO TIA, NO CLINICAL ARRHYTHMIA,CONTINUE CURRENT MEDS, EDUCATION, DIET, EXERCISE , STAY ACTIVE  RETURN TO CLINIC IN 6 MO WITH LABS  Non-rheumatic mitral regurgitation    Elevated left ventricular end-diastolic pressure (LVEDP)  Comments:  LOW-DOSE DIURETIC  Orders:  -     Comprehensive Metabolic Panel; Future; Expected date: 04/10/2023    Carotid artery plaque, bilateral  Comments:  NO TIA    Hypercholesterolemia  Comments:  MILD  Orders:  -     Comprehensive Metabolic Panel; Future; Expected date: 04/10/2023    Primary hypertension  -     Comprehensive Metabolic Panel; Future; Expected date: 04/10/2023    Other orders  -     hydroCHLOROthiazide (HYDRODIURIL) 12.5 MG Tab; Take 1 tablet (12.5 mg total) by mouth every other day.  Dispense: 45 tablet; Refill: 1  RTC Low level/low impact aerobic exercise 5x's/wk. Heart healthy diet and risk factor modification.    See labs and med orders.    Aerobic exercise 5x's/wk. Heart healthy diet and risk factor modification.    See labs and med orders.

## 2022-10-25 PROBLEM — Z86.16 HISTORY OF 2019 NOVEL CORONAVIRUS DISEASE (COVID-19): Status: RESOLVED | Noted: 2021-01-26 | Resolved: 2022-10-25

## 2022-11-14 ENCOUNTER — OFFICE VISIT (OUTPATIENT)
Dept: OBSTETRICS AND GYNECOLOGY | Facility: CLINIC | Age: 77
End: 2022-11-14
Payer: MEDICARE

## 2022-11-14 VITALS
DIASTOLIC BLOOD PRESSURE: 90 MMHG | BODY MASS INDEX: 24.45 KG/M2 | SYSTOLIC BLOOD PRESSURE: 160 MMHG | WEIGHT: 156.06 LBS

## 2022-11-14 DIAGNOSIS — Z01.419 WELL WOMAN EXAM: ICD-10-CM

## 2022-11-14 DIAGNOSIS — Z01.419 ENCOUNTER FOR ANNUAL ROUTINE GYNECOLOGICAL EXAMINATION: ICD-10-CM

## 2022-11-14 DIAGNOSIS — Z01.419 SMEAR, VAGINAL, AS PART OF ROUTINE GYNECOLOGICAL EXAMINATION: Primary | ICD-10-CM

## 2022-11-14 DIAGNOSIS — Z12.72 SMEAR, VAGINAL, AS PART OF ROUTINE GYNECOLOGICAL EXAMINATION: Primary | ICD-10-CM

## 2022-11-14 PROCEDURE — 3288F FALL RISK ASSESSMENT DOCD: CPT | Mod: CPTII,S$GLB,, | Performed by: OBSTETRICS & GYNECOLOGY

## 2022-11-14 PROCEDURE — 3080F PR MOST RECENT DIASTOLIC BLOOD PRESSURE >= 90 MM HG: ICD-10-PCS | Mod: CPTII,S$GLB,, | Performed by: OBSTETRICS & GYNECOLOGY

## 2022-11-14 PROCEDURE — 3077F PR MOST RECENT SYSTOLIC BLOOD PRESSURE >= 140 MM HG: ICD-10-PCS | Mod: CPTII,S$GLB,, | Performed by: OBSTETRICS & GYNECOLOGY

## 2022-11-14 PROCEDURE — 1101F PR PT FALLS ASSESS DOC 0-1 FALLS W/OUT INJ PAST YR: ICD-10-PCS | Mod: CPTII,S$GLB,, | Performed by: OBSTETRICS & GYNECOLOGY

## 2022-11-14 PROCEDURE — 1159F PR MEDICATION LIST DOCUMENTED IN MEDICAL RECORD: ICD-10-PCS | Mod: CPTII,S$GLB,, | Performed by: OBSTETRICS & GYNECOLOGY

## 2022-11-14 PROCEDURE — 99999 PR PBB SHADOW E&M-EST. PATIENT-LVL IV: ICD-10-PCS | Mod: PBBFAC,,, | Performed by: OBSTETRICS & GYNECOLOGY

## 2022-11-14 PROCEDURE — 3288F PR FALLS RISK ASSESSMENT DOCUMENTED: ICD-10-PCS | Mod: CPTII,S$GLB,, | Performed by: OBSTETRICS & GYNECOLOGY

## 2022-11-14 PROCEDURE — 1159F MED LIST DOCD IN RCRD: CPT | Mod: CPTII,S$GLB,, | Performed by: OBSTETRICS & GYNECOLOGY

## 2022-11-14 PROCEDURE — 1160F PR REVIEW ALL MEDS BY PRESCRIBER/CLIN PHARMACIST DOCUMENTED: ICD-10-PCS | Mod: CPTII,S$GLB,, | Performed by: OBSTETRICS & GYNECOLOGY

## 2022-11-14 PROCEDURE — G0101 CA SCREEN;PELVIC/BREAST EXAM: HCPCS | Mod: S$GLB,,, | Performed by: OBSTETRICS & GYNECOLOGY

## 2022-11-14 PROCEDURE — 99999 PR PBB SHADOW E&M-EST. PATIENT-LVL IV: CPT | Mod: PBBFAC,,, | Performed by: OBSTETRICS & GYNECOLOGY

## 2022-11-14 PROCEDURE — 3080F DIAST BP >= 90 MM HG: CPT | Mod: CPTII,S$GLB,, | Performed by: OBSTETRICS & GYNECOLOGY

## 2022-11-14 PROCEDURE — 1101F PT FALLS ASSESS-DOCD LE1/YR: CPT | Mod: CPTII,S$GLB,, | Performed by: OBSTETRICS & GYNECOLOGY

## 2022-11-14 PROCEDURE — 1126F AMNT PAIN NOTED NONE PRSNT: CPT | Mod: CPTII,S$GLB,, | Performed by: OBSTETRICS & GYNECOLOGY

## 2022-11-14 PROCEDURE — 1160F RVW MEDS BY RX/DR IN RCRD: CPT | Mod: CPTII,S$GLB,, | Performed by: OBSTETRICS & GYNECOLOGY

## 2022-11-14 PROCEDURE — 1126F PR PAIN SEVERITY QUANTIFIED, NO PAIN PRESENT: ICD-10-PCS | Mod: CPTII,S$GLB,, | Performed by: OBSTETRICS & GYNECOLOGY

## 2022-11-14 PROCEDURE — G0101 PR CA SCREEN;PELVIC/BREAST EXAM: ICD-10-PCS | Mod: S$GLB,,, | Performed by: OBSTETRICS & GYNECOLOGY

## 2022-11-14 PROCEDURE — 88175 CYTOPATH C/V AUTO FLUID REDO: CPT | Performed by: OBSTETRICS & GYNECOLOGY

## 2022-11-14 PROCEDURE — 3077F SYST BP >= 140 MM HG: CPT | Mod: CPTII,S$GLB,, | Performed by: OBSTETRICS & GYNECOLOGY

## 2022-11-14 RX ORDER — ESTRADIOL 2 MG/1
1 TABLET ORAL DAILY
Qty: 90 TABLET | Refills: 4 | Status: SHIPPED | OUTPATIENT
Start: 2022-11-14 | End: 2023-02-02 | Stop reason: SDUPTHER

## 2022-11-14 NOTE — PROGRESS NOTES
Chief Complaint   Patient presents with    [facility]       History and Physical:  No LMP recorded. Patient has had a hysterectomy.       Tayler Dominguez is a 77 y.o. WF who presents today for her routine annual GYN exam. The patient has no Gynecology complaints today. Refill HRT on 2 mg estrace q day.  Has migraines do better on HRT      Allergies:   Review of patient's allergies indicates:   Allergen Reactions    Ambien [zolpidem]     Cephalosporins     Cleocin [clindamycin hcl]     Codeine     Erythropoietin analogues     Influenza vaccine tri-sp 09-10     Levaquin [levofloxacin]     Macrobid [nitrofurantoin monohyd/m-cryst]     Morphine     Pcn [penicillins]     Sudal [phenylephrine-pot guaiacolsulf]     Sulfa (sulfonamide antibiotics)        Past Medical History:   Diagnosis Date    Allergy     Arthritis     hands    Breast disorder     Crescendo angina     Edema     Heart murmur     MVP    History of 2019 novel coronavirus disease (COVID-19) 01/26/2021    Hypertension     Kidney stones     Menopause     Migraine headache     Mitral regurgitation     Near syncope     Sinusitis     SOB (shortness of breath)     Supraventricular tachycardia        Past Surgical History:   Procedure Laterality Date    ABDOMINAL SURGERY      APPENDECTOMY      BREAST BIOPSY      CARDIAC CATHETERIZATION  2013    CATARACT EXTRACTION W/  INTRAOCULAR LENS IMPLANT Bilateral     COLONOSCOPY  11/15/2013    Done by Dr. ELTON Lawrence -- report in paper chart    CORONARY ANGIOGRAPHY N/A 08/18/2020    Procedure: ANGIOGRAM, CORONARY ARTERY;  Surgeon: Anjelica James MD;  Location: [facility] CATH;  Service: Cardiology;  Laterality: N/A;    HYSTERECTOMY      PARTIAL @ 27 YO, COMPLETE @ 32 YO    LEFT HEART CATHETERIZATION N/A 08/18/2020    Procedure: Left heart cath;  Surgeon: Anjelica James MD;  Location: [facility] CATH;  Service: Cardiology;  Laterality: N/A;    RADIOFREQUENCY ABLATION  2013    at [facility] heart        MEDS:   Current Outpatient  Medications on File Prior to Visit   Medication Sig Dispense Refill    ascorbic acid, vitamin C, (VITAMIN C) 500 MG tablet Take 500 mg by mouth once daily.      aspirin (ECOTRIN) 81 MG EC tablet Take 81 mg by mouth. Every other day      butalbital-aspirin-caffeine -40 mg (FIORINAL) -40 mg Cap Take 1 capsule by mouth every 4 (four) hours as needed. 45 capsule 2    diltiaZEM (CARDIZEM CD) 120 MG Cp24 Take 1 capsule (120 mg total) by mouth once daily. 90 capsule 3    ketoconazole (NIZORAL) 2 % shampoo APPLY TO AFFECTED AREA EXTERNALLY AS DIRECTED 3 TIMES A WEEK  6    levocetirizine (XYZAL) 5 MG tablet TAKE 1 TABLET (5 MG TOTAL) BY MOUTH EVERY EVENING. 90 tablet 3    meloxicam (MOBIC) 7.5 MG tablet Take 1 tablet (7.5 mg total) by mouth once daily. 20 tablet 1    metoprolol succinate (TOPROL-XL) 25 MG 24 hr tablet TAKE ONE TABLET BY MOUTH EVERY DAY 90 tablet 0    ondansetron (ZOFRAN) 4 MG tablet Take 1 tablet (4 mg total) by mouth every 8 (eight) hours as needed for Nausea. 25 tablet 2    promethazine (PHENERGAN) 25 MG tablet Take 25 mg by mouth every 6 (six) hours as needed for Nausea.      vitamin D (VITAMIN D3) 1000 units Tab Take 1,000 Units by mouth once daily.      [DISCONTINUED] estradioL (ESTRACE) 2 MG tablet Take 1 tablet (2 mg total) by mouth once daily. 90 tablet 4    baclofen (LIORESAL) 10 MG tablet Take 1 tablet (10 mg total) by mouth 3 (three) times daily. 30 tablet 2    fluconazole (DIFLUCAN) 150 MG Tab Take 1 tab and repeat in 3-5 days if needed. (Patient not taking: Reported on 11/14/2022) 2 tablet 0    hydroCHLOROthiazide (HYDRODIURIL) 12.5 MG Tab Take 1 tablet (12.5 mg total) by mouth every other day. (Patient not taking: Reported on 11/14/2022) 45 tablet 1    nitroGLYCERIN (NITROSTAT) 0.4 MG SL tablet Place 1 tablet (0.4 mg total) under the tongue every 5 (five) minutes as needed. 25 tablet 0     No current facility-administered medications on file prior to visit.       OB History    No  obstetric history on file.         Social History     Socioeconomic History    Marital status:    Tobacco Use    Smoking status: Never    Smokeless tobacco: Never   Substance and Sexual Activity    Alcohol use: No    Drug use: No       Family History   Problem Relation Age of Onset    Cancer Mother     Aneurysm Mother     Hypertension Mother     Heart disease Father     Hyperlipidemia Father     Hypertension Father     Diabetes Father     Kidney disease Brother          Past medical and surgical history reviewed.   I have reviewed the patient's medical history in detail and updated the computerized patient record.        Review of System:   General: no chills, fever, night sweats, weight gain or weight loss  Psychological: no depression or suicidal ideation  Breasts: no new or changing breast lumps, nipple discharge or masses.  Respiratory: no cough, shortness of breath, or wheezing  Cardiovascular: no chest pain or dyspnea on exertion  Gastrointestinal: no abdominal pain, change in bowel habits, or black or bloody stools  Genito-Urinary: no incontinence, urinary frequency/urgency or vulvar/vaginal symptoms, pelvic pain or abnormal vaginal bleeding.  Musculoskeletal: no gait disturbance or muscular weakness      Physical Exam:   BP (!) 160/90   Wt 70.8 kg (156 lb 1.4 oz)   BMI 24.45 kg/m²   Constitutional: She is oriented to person, place, and time. She appears well-developed and well-nourished. No distress.  HENT:   Head: Normocephalic and atraumatic.   Eyes: Conjunctivae and EOM are normal. No scleral icterus.   Neck: Normal range of motion. Neck supple. No tracheal deviation present.   Cardiovascular: Normal rate.    Pulmonary/Chest: Effort normal. No respiratory distress. She exhibits no tenderness.  Breasts: are symmetrical.no masses    Right breast exhibits no inverted nipple, no mass, no nipple discharge, no skin change and no tenderness.   Left breast exhibits no inverted nipple, no mass, no nipple  discharge, no skin change and no tenderness.  Abdominal: Soft. She exhibits no distension and no mass. There is no tenderness. There is no rebound and no guarding.   Genitourinary:    External rectal exam shows no thrombosed external hemorrhoids.    Pelvic exam was performed with patient supine.   No labial fusion.   There is no rash, lesion or injury on the right labia.   There is no rash, lesion or injury on the left labia.   No bleeding and no signs of injury around the vaginal introitus, urethra is without lesions and well supported.    No vaginal discharge found.    No significant Cystocele, Enterocele or rectocele, and cuff well supported.   Bimanual exam:   The urethra and vagina are without palpable masses or tenderness.   Uterus and cervix are surgically absents, vaginal cuff is intact and well supported.   Right adnexum displays no mass and no tenderness.   Left adnexum displays no mass and no tenderness.  Musculoskeletal: Normal range of motion.   Lymphadenopathy: No inguinal adenopathy present.   Neurological: She is alert and oriented to person, place, and time. Coordination normal.   Skin: Skin is warm and dry. She is not diaphoretic.   Psychiatric: She has a normal mood and affect.        Assessment:   Normal annual GYN exam  1. Smear, vaginal, as part of routine gynecological examination  Liquid-Based Pap Smear, Screening      2. Well woman exam  Ambulatory referral/consult to Gynecology      3. Encounter for annual routine gynecological examination          Menopause  HRT accepts risk/benefits    Plan:   PAP   Mammogram  Try to decrease and eventually stop HRT  Estrace 1mg  Follow up in 1 year.

## 2023-02-02 RX ORDER — ESTRADIOL 2 MG/1
2 TABLET ORAL DAILY
Qty: 90 TABLET | Refills: 4 | Status: SHIPPED | OUTPATIENT
Start: 2023-02-02 | End: 2024-01-25

## 2023-02-02 NOTE — TELEPHONE ENCOUNTER
----- Message from Kelley Lora sent at 2/2/2023  1:43 PM CST -----  Type: Needs Medical Advice  Who Called:  py  Symptoms (please be specific):  pt said she need to speak to the dr--said he lowered the dose on her meds estradioL (ESTRACE) 2 MG tablet and she had to take the whole pill and now she running out of her meds and the dr told her to call back if she needed more--said she been out for 2 day and she would like to have it done before the weekend--please call and advise  Best Call Back Number: [phone]    Additional Information: thank you

## 2023-02-08 ENCOUNTER — LAB VISIT (OUTPATIENT)
Dept: FAMILY MEDICINE | Facility: CLINIC | Age: 78
End: 2023-02-08
Payer: MEDICARE

## 2023-02-08 DIAGNOSIS — I65.23 CAROTID ARTERY PLAQUE, BILATERAL: ICD-10-CM

## 2023-02-08 DIAGNOSIS — R00.0 TACHYCARDIA: ICD-10-CM

## 2023-02-08 DIAGNOSIS — I10 ESSENTIAL HYPERTENSION: ICD-10-CM

## 2023-02-08 DIAGNOSIS — E78.00 HYPERCHOLESTEROLEMIA: ICD-10-CM

## 2023-02-08 LAB
ALBUMIN SERPL BCP-MCNC: 3.2 G/DL (ref 3.5–5.2)
ALP SERPL-CCNC: 60 U/L (ref 55–135)
ALT SERPL W/O P-5'-P-CCNC: 11 U/L (ref 10–44)
ANION GAP SERPL CALC-SCNC: 13 MMOL/L (ref 8–16)
AST SERPL-CCNC: 17 U/L (ref 10–40)
BASOPHILS # BLD AUTO: 0.06 K/UL (ref 0–0.2)
BASOPHILS NFR BLD: 0.7 % (ref 0–1.9)
BILIRUB SERPL-MCNC: 0.4 MG/DL (ref 0.1–1)
BUN SERPL-MCNC: 14 MG/DL (ref 8–23)
CALCIUM SERPL-MCNC: 8.8 MG/DL (ref 8.7–10.5)
CHLORIDE SERPL-SCNC: 104 MMOL/L (ref 95–110)
CHOLEST SERPL-MCNC: 209 MG/DL (ref 120–199)
CHOLEST/HDLC SERPL: 3.3 {RATIO} (ref 2–5)
CO2 SERPL-SCNC: 25 MMOL/L (ref 23–29)
CREAT SERPL-MCNC: 0.8 MG/DL (ref 0.5–1.4)
DIFFERENTIAL METHOD: ABNORMAL
EOSINOPHIL # BLD AUTO: 0.2 K/UL (ref 0–0.5)
EOSINOPHIL NFR BLD: 2.7 % (ref 0–8)
ERYTHROCYTE [DISTWIDTH] IN BLOOD BY AUTOMATED COUNT: 13.2 % (ref 11.5–14.5)
EST. GFR  (NO RACE VARIABLE): >60 ML/MIN/1.73 M^2
ESTIMATED AVG GLUCOSE: 105 MG/DL (ref 68–131)
GLUCOSE SERPL-MCNC: 84 MG/DL (ref 70–110)
HBA1C MFR BLD: 5.3 % (ref 4–5.6)
HCT VFR BLD AUTO: 41.3 % (ref 37–48.5)
HDLC SERPL-MCNC: 64 MG/DL (ref 40–75)
HDLC SERPL: 30.6 % (ref 20–50)
HGB BLD-MCNC: 13 G/DL (ref 12–16)
IMM GRANULOCYTES # BLD AUTO: 0.03 K/UL (ref 0–0.04)
IMM GRANULOCYTES NFR BLD AUTO: 0.3 % (ref 0–0.5)
LDLC SERPL CALC-MCNC: 107 MG/DL (ref 63–159)
LYMPHOCYTES # BLD AUTO: 1.5 K/UL (ref 1–4.8)
LYMPHOCYTES NFR BLD: 16.8 % (ref 18–48)
MCH RBC QN AUTO: 29.8 PG (ref 27–31)
MCHC RBC AUTO-ENTMCNC: 31.5 G/DL (ref 32–36)
MCV RBC AUTO: 95 FL (ref 82–98)
MONOCYTES # BLD AUTO: 0.8 K/UL (ref 0.3–1)
MONOCYTES NFR BLD: 9.3 % (ref 4–15)
NEUTROPHILS # BLD AUTO: 6.3 K/UL (ref 1.8–7.7)
NEUTROPHILS NFR BLD: 70.2 % (ref 38–73)
NONHDLC SERPL-MCNC: 145 MG/DL
NRBC BLD-RTO: 0 /100 WBC
PLATELET # BLD AUTO: 241 K/UL (ref 150–450)
PMV BLD AUTO: 10.4 FL (ref 9.2–12.9)
POTASSIUM SERPL-SCNC: 4.2 MMOL/L (ref 3.5–5.1)
PROT SERPL-MCNC: 6.2 G/DL (ref 6–8.4)
RBC # BLD AUTO: 4.36 M/UL (ref 4–5.4)
SODIUM SERPL-SCNC: 142 MMOL/L (ref 136–145)
TRIGL SERPL-MCNC: 190 MG/DL (ref 30–150)
TSH SERPL DL<=0.005 MIU/L-ACNC: 2.88 UIU/ML (ref 0.4–4)
WBC # BLD AUTO: 8.97 K/UL (ref 3.9–12.7)

## 2023-02-08 PROCEDURE — 80053 COMPREHEN METABOLIC PANEL: CPT | Performed by: STUDENT IN AN ORGANIZED HEALTH CARE EDUCATION/TRAINING PROGRAM

## 2023-02-08 PROCEDURE — 84443 ASSAY THYROID STIM HORMONE: CPT | Performed by: STUDENT IN AN ORGANIZED HEALTH CARE EDUCATION/TRAINING PROGRAM

## 2023-02-08 PROCEDURE — 83036 HEMOGLOBIN GLYCOSYLATED A1C: CPT | Performed by: STUDENT IN AN ORGANIZED HEALTH CARE EDUCATION/TRAINING PROGRAM

## 2023-02-08 PROCEDURE — 80061 LIPID PANEL: CPT | Performed by: STUDENT IN AN ORGANIZED HEALTH CARE EDUCATION/TRAINING PROGRAM

## 2023-02-08 PROCEDURE — 85025 COMPLETE CBC W/AUTO DIFF WBC: CPT | Performed by: STUDENT IN AN ORGANIZED HEALTH CARE EDUCATION/TRAINING PROGRAM

## 2023-02-08 PROCEDURE — 36415 COLL VENOUS BLD VENIPUNCTURE: CPT | Performed by: STUDENT IN AN ORGANIZED HEALTH CARE EDUCATION/TRAINING PROGRAM

## 2023-04-13 ENCOUNTER — OFFICE VISIT (OUTPATIENT)
Dept: CARDIOLOGY | Facility: CLINIC | Age: 78
End: 2023-04-13
Payer: MEDICARE

## 2023-04-13 VITALS
SYSTOLIC BLOOD PRESSURE: 136 MMHG | BODY MASS INDEX: 25.01 KG/M2 | WEIGHT: 159.38 LBS | HEIGHT: 67 IN | DIASTOLIC BLOOD PRESSURE: 78 MMHG | HEART RATE: 90 BPM

## 2023-04-13 DIAGNOSIS — I34.0 NON-RHEUMATIC MITRAL REGURGITATION: Primary | Chronic | ICD-10-CM

## 2023-04-13 DIAGNOSIS — I77.9 MILD CAROTID ARTERY DISEASE: Chronic | ICD-10-CM

## 2023-04-13 DIAGNOSIS — I10 ESSENTIAL HYPERTENSION: Chronic | ICD-10-CM

## 2023-04-13 DIAGNOSIS — E78.00 HYPERCHOLESTEROLEMIA: Chronic | ICD-10-CM

## 2023-04-13 DIAGNOSIS — R94.30 ELEVATED LEFT VENTRICULAR END-DIASTOLIC PRESSURE (LVEDP): Chronic | ICD-10-CM

## 2023-04-13 DIAGNOSIS — I49.49 PREMATURE BEATS: Chronic | ICD-10-CM

## 2023-04-13 PROBLEM — I65.22 CAROTID STENOSIS, ASYMPTOMATIC, LEFT: Status: RESOLVED | Noted: 2020-06-12 | Resolved: 2023-04-13

## 2023-04-13 PROCEDURE — 1159F MED LIST DOCD IN RCRD: CPT | Mod: CPTII,S$GLB,, | Performed by: INTERNAL MEDICINE

## 2023-04-13 PROCEDURE — 3288F PR FALLS RISK ASSESSMENT DOCUMENTED: ICD-10-PCS | Mod: CPTII,S$GLB,, | Performed by: INTERNAL MEDICINE

## 2023-04-13 PROCEDURE — 3078F PR MOST RECENT DIASTOLIC BLOOD PRESSURE < 80 MM HG: ICD-10-PCS | Mod: CPTII,S$GLB,, | Performed by: INTERNAL MEDICINE

## 2023-04-13 PROCEDURE — 3078F DIAST BP <80 MM HG: CPT | Mod: CPTII,S$GLB,, | Performed by: INTERNAL MEDICINE

## 2023-04-13 PROCEDURE — 99999 PR PBB SHADOW E&M-EST. PATIENT-LVL III: ICD-10-PCS | Mod: PBBFAC,,, | Performed by: INTERNAL MEDICINE

## 2023-04-13 PROCEDURE — 1126F PR PAIN SEVERITY QUANTIFIED, NO PAIN PRESENT: ICD-10-PCS | Mod: CPTII,S$GLB,, | Performed by: INTERNAL MEDICINE

## 2023-04-13 PROCEDURE — 99999 PR PBB SHADOW E&M-EST. PATIENT-LVL III: CPT | Mod: PBBFAC,,, | Performed by: INTERNAL MEDICINE

## 2023-04-13 PROCEDURE — 3288F FALL RISK ASSESSMENT DOCD: CPT | Mod: CPTII,S$GLB,, | Performed by: INTERNAL MEDICINE

## 2023-04-13 PROCEDURE — 1126F AMNT PAIN NOTED NONE PRSNT: CPT | Mod: CPTII,S$GLB,, | Performed by: INTERNAL MEDICINE

## 2023-04-13 PROCEDURE — 1101F PR PT FALLS ASSESS DOC 0-1 FALLS W/OUT INJ PAST YR: ICD-10-PCS | Mod: CPTII,S$GLB,, | Performed by: INTERNAL MEDICINE

## 2023-04-13 PROCEDURE — 3044F PR MOST RECENT HEMOGLOBIN A1C LEVEL <7.0%: ICD-10-PCS | Mod: CPTII,S$GLB,, | Performed by: INTERNAL MEDICINE

## 2023-04-13 PROCEDURE — 99214 PR OFFICE/OUTPT VISIT, EST, LEVL IV, 30-39 MIN: ICD-10-PCS | Mod: S$GLB,,, | Performed by: INTERNAL MEDICINE

## 2023-04-13 PROCEDURE — 3044F HG A1C LEVEL LT 7.0%: CPT | Mod: CPTII,S$GLB,, | Performed by: INTERNAL MEDICINE

## 2023-04-13 PROCEDURE — 99214 OFFICE O/P EST MOD 30 MIN: CPT | Mod: S$GLB,,, | Performed by: INTERNAL MEDICINE

## 2023-04-13 PROCEDURE — 3075F PR MOST RECENT SYSTOLIC BLOOD PRESS GE 130-139MM HG: ICD-10-PCS | Mod: CPTII,S$GLB,, | Performed by: INTERNAL MEDICINE

## 2023-04-13 PROCEDURE — 1159F PR MEDICATION LIST DOCUMENTED IN MEDICAL RECORD: ICD-10-PCS | Mod: CPTII,S$GLB,, | Performed by: INTERNAL MEDICINE

## 2023-04-13 PROCEDURE — 1101F PT FALLS ASSESS-DOCD LE1/YR: CPT | Mod: CPTII,S$GLB,, | Performed by: INTERNAL MEDICINE

## 2023-04-13 PROCEDURE — 3075F SYST BP GE 130 - 139MM HG: CPT | Mod: CPTII,S$GLB,, | Performed by: INTERNAL MEDICINE

## 2023-04-13 RX ORDER — METOPROLOL SUCCINATE 25 MG/1
25 TABLET, EXTENDED RELEASE ORAL DAILY
Qty: 90 TABLET | Refills: 1 | Status: SHIPPED | OUTPATIENT
Start: 2023-04-13 | End: 2023-08-16

## 2023-04-13 NOTE — PROGRESS NOTES
Subjective:    Patient ID:  Tayler Dominguez is a 77 y.o. female who presents for Non-rheumatic mitral regurgitation        HPI  DISCUSSED LABS AND GOALS CMP OK, ,,  DOING WELL, NO CHEST PAIN NO SIGNIFICANT SHORTNESS OF BREATH NO TIA TYPE SYMPTOMS NO NEAR-SYNCOPE,, SEE ROS    Past Medical History:   Diagnosis Date    Allergy     Arthritis     hands    Breast disorder     Crescendo angina     Edema     Heart murmur     MVP    History of 2019 novel coronavirus disease (COVID-19) 01/26/2021    Hypertension     Kidney stones     Menopause     Migraine headache     Mitral regurgitation     Near syncope     Sinusitis     SOB (shortness of breath)     Supraventricular tachycardia      Past Surgical History:   Procedure Laterality Date    ABDOMINAL SURGERY      APPENDECTOMY      BREAST BIOPSY      CARDIAC CATHETERIZATION  2013    CATARACT EXTRACTION W/  INTRAOCULAR LENS IMPLANT Bilateral     COLONOSCOPY  11/15/2013    Done by Dr. ELTON Lawrence -- report in paper chart    CORONARY ANGIOGRAPHY N/A 08/18/2020    Procedure: ANGIOGRAM, CORONARY ARTERY;  Surgeon: Anjelica James MD;  [facility];  Service: Cardiology;  Laterality: N/A;    HYSTERECTOMY      PARTIAL @ 27 YO, COMPLETE @ 30 YO    LEFT HEART CATHETERIZATION N/A 08/18/2020    Procedure: Left heart cath;  Surgeon: Anjelica James MD;  Location: [facility];  Service: Cardiology;  Laterality: N/A;    RADIOFREQUENCY ABLATION  2013    at [facility] heart      Family History   Problem Relation Age of Onset    Cancer Mother     Aneurysm Mother     Hypertension Mother     Heart disease Father     Hyperlipidemia Father     Hypertension Father     Diabetes Father     Kidney disease Brother      Social History     Socioeconomic History    Marital status:    Tobacco Use    Smoking status: Never    Smokeless tobacco: Never   Substance and Sexual Activity    Alcohol use: No    Drug use: No       Review of patient's allergies indicates:   Allergen Reactions    Ambien  [zolpidem]     Cephalosporins     Cleocin [clindamycin hcl]     Codeine     Erythropoietin analogues     Influenza vaccine tri-sp 09-10     Levaquin [levofloxacin]     Macrobid [nitrofurantoin monohyd/m-cryst]     Morphine     Pcn [penicillins]     Sudal [phenylephrine-pot guaiacolsulf]     Sulfa (sulfonamide antibiotics)        Current Outpatient Medications:     ascorbic acid, vitamin C, (VITAMIN C) 500 MG tablet, Take 500 mg by mouth once daily., Disp: , Rfl:     aspirin (ECOTRIN) 81 MG EC tablet, Take 81 mg by mouth. Every other day, Disp: , Rfl:     butalbital-acetaminophen-caffeine -40 mg (FIORICET, ESGIC) -40 mg per tablet, Take by mouth., Disp: , Rfl:     diltiaZEM (CARDIZEM CD) 120 MG Cp24, Take 1 capsule (120 mg total) by mouth once daily., Disp: 90 capsule, Rfl: 3    estradioL (ESTRACE) 2 MG tablet, Take 1 tablet (2 mg total) by mouth once daily., Disp: 90 tablet, Rfl: 4    hydroCHLOROthiazide (MICROZIDE) 12.5 mg capsule, Take by mouth., Disp: , Rfl:     ketoconazole (NIZORAL) 2 % shampoo, APPLY TO AFFECTED AREA EXTERNALLY AS DIRECTED 3 TIMES A WEEK, Disp: , Rfl: 6    levocetirizine (XYZAL) 5 MG tablet, TAKE 1 TABLET (5 MG TOTAL) BY MOUTH EVERY EVENING., Disp: 90 tablet, Rfl: 3    meloxicam (MOBIC) 7.5 MG tablet, Take 1 tablet (7.5 mg total) by mouth once daily., Disp: 20 tablet, Rfl: 1    ondansetron (ZOFRAN) 4 MG tablet, Take 1 tablet (4 mg total) by mouth every 8 (eight) hours as needed for Nausea., Disp: 25 tablet, Rfl: 2    vitamin D (VITAMIN D3) 1000 units Tab, Take 1,000 Units by mouth once daily., Disp: , Rfl:     baclofen (LIORESAL) 10 MG tablet, Take 1 tablet (10 mg total) by mouth 3 (three) times daily., Disp: 30 tablet, Rfl: 2    metoprolol succinate (TOPROL-XL) 25 MG 24 hr tablet, Take 1 tablet (25 mg total) by mouth once daily., Disp: 90 tablet, Rfl: 1    nitroGLYCERIN (NITROSTAT) 0.4 MG SL tablet, Place 1 tablet (0.4 mg total) under the tongue every 5 (five) minutes as needed.,  "Disp: 25 tablet, Rfl: 0    Review of Systems   Constitutional: Negative. Negative for chills and fever.   HENT:  Negative for congestion and sore throat.    Eyes:  Negative for blurred vision and vision loss in right eye.   Cardiovascular:  Positive for palpitations (SAME). Negative for chest pain, claudication, cyanosis, dyspnea on exertion (OCC), leg swelling, near-syncope, orthopnea, paroxysmal nocturnal dyspnea and syncope.   Respiratory:  Negative for cough (RESOLVED), hemoptysis, shortness of breath and wheezing (RESOLVED).    Hematologic/Lymphatic: Negative.    Skin:  Negative for color change and rash.   Musculoskeletal:  Positive for back pain. Negative for falls.   Gastrointestinal:  Negative for abdominal pain, dysphagia, jaundice, melena and nausea.   Genitourinary:  Negative for dysuria and flank pain.   Neurological:  Negative for dizziness, focal weakness, light-headedness and loss of balance.   Psychiatric/Behavioral:  Negative for altered mental status and depression.    Allergic/Immunologic: Negative.       Objective:      Vitals:    04/13/23 1430   BP: 136/78   Pulse: 90   Weight: 72.3 kg (159 lb 6.3 oz)   Height: 5' 7" (1.702 m)   PainSc: 0-No pain     Body mass index is 24.96 kg/m².    Physical Exam  Constitutional:       Appearance: Normal appearance.   HENT:      Head: Normocephalic and atraumatic.   Eyes:      General: No scleral icterus.     Extraocular Movements: Extraocular movements intact.   Neck:      Vascular: No carotid bruit.   Cardiovascular:      Rate and Rhythm: Normal rate and regular rhythm. No extrasystoles are present.     Pulses: Normal pulses.           Carotid pulses are 2+ on the right side and 2+ on the left side.       Radial pulses are 2+ on the right side and 2+ on the left side.        Posterior tibial pulses are 2+ on the right side and 2+ on the left side.      Heart sounds: Murmur heard.   Systolic murmur is present with a grade of 1/6 at the lower left sternal " border.     No friction rub. No gallop.   Abdominal:      Palpations: Abdomen is soft.      Tenderness: There is no abdominal tenderness.   Musculoskeletal:      Cervical back: Neck supple.      Right lower leg: No edema.      Left lower leg: No edema.   Skin:     Capillary Refill: Capillary refill takes less than 2 seconds.   Neurological:      General: No focal deficit present.      Mental Status: She is alert and oriented to person, place, and time.      Cranial Nerves: Cranial nerves 2-12 are intact. No cranial nerve deficit.   Psychiatric:         Attention and Perception: Attention normal.         Mood and Affect: Mood normal.         Speech: Speech normal.         Behavior: Behavior normal.             ..    Chemistry        Component Value Date/Time     02/08/2023 0856    K 4.2 02/08/2023 0856     02/08/2023 0856    CO2 25 02/08/2023 0856    BUN 14 02/08/2023 0856    CREATININE 0.8 02/08/2023 0856    GLU 84 02/08/2023 0856        Component Value Date/Time    CALCIUM 8.8 02/08/2023 0856    ALKPHOS 60 02/08/2023 0856    AST 17 02/08/2023 0856    AST 37 (H) 04/18/2016 2014    ALT 11 02/08/2023 0856    BILITOT 0.4 02/08/2023 0856    ESTGFRAFRICA >60.0 11/24/2021 1100    EGFRNONAA >60.0 11/24/2021 1100            ..  Lab Results   Component Value Date    CHOL 209 (H) 02/08/2023    CHOL 218 (H) 08/19/2022    CHOL 183 11/24/2021     Lab Results   Component Value Date    HDL 64 02/08/2023    HDL 76 (H) 08/19/2022    HDL 72 11/24/2021     Lab Results   Component Value Date    LDLCALC 107.0 02/08/2023    LDLCALC 120.8 08/19/2022    LDLCALC 82.8 11/24/2021     Lab Results   Component Value Date    TRIG 190 (H) 02/08/2023    TRIG 106 08/19/2022    TRIG 141 11/24/2021     Lab Results   Component Value Date    CHOLHDL 30.6 02/08/2023    CHOLHDL 34.9 08/19/2022    CHOLHDL 39.3 11/24/2021     ..  Lab Results   Component Value Date    WBC 8.97 02/08/2023    HGB 13.0 02/08/2023    HCT 41.3 02/08/2023    MCV 95  02/08/2023     02/08/2023       Test(s) Reviewed  I have reviewed the following in detail:  [] Stress test   [] Angiography   [] Echocardiogram   [] Labs   [] Other:       Assessment:         ICD-10-CM ICD-9-CM   1. Non-rheumatic mitral regurgitation  I34.0 424.0   2. Premature beats  I49.49 427.60   3. Mild carotid artery disease  I77.9 447.9   4. Elevated left ventricular end-diastolic pressure (LVEDP)  R94.30 794.30   5. Hypercholesterolemia  E78.00 272.0   6. Essential hypertension  I10 401.9     Problem List Items Addressed This Visit          Cardiac/Vascular    Non-rheumatic mitral regurgitation - Primary    Premature beats    Relevant Orders    Holter monitor - 72 hour    Essential hypertension    Elevated left ventricular end-diastolic pressure (LVEDP)    Carotid artery plaque, bilateral    Hypercholesterolemia        Plan:     CONSIDER STATIN IN VIEW OF CAROTID PLAQUE IN DISEASE, PATIENT DECLINES,ALL CV CLINICALLY STABLE, NO ANGINA, NO HF, NO TIA, NO CLINICAL ARRHYTHMIA,CONTINUE CURRENT MEDS, EDUCATION, DIET, EXERCISE , RETURN TO CLINIC IN 9 MONTHS WITH HOLTER SOON IN VIEW OF SYMPTOMS      Non-rheumatic mitral regurgitation    Premature beats  -     Holter monitor - 72 hour; Future    Mild carotid artery disease    Elevated left ventricular end-diastolic pressure (LVEDP)    Hypercholesterolemia    Essential hypertension    Other orders  -     metoprolol succinate (TOPROL-XL) 25 MG 24 hr tablet; Take 1 tablet (25 mg total) by mouth once daily.  Dispense: 90 tablet; Refill: 1    RTC Low level/low impact aerobic exercise 5x's/wk. Heart healthy diet and risk factor modification.    See labs and med orders.    Aerobic exercise 5x's/wk. Heart healthy diet and risk factor modification.    See labs and med orders.

## 2023-07-03 DIAGNOSIS — I10 ESSENTIAL HYPERTENSION: Primary | ICD-10-CM

## 2023-07-03 RX ORDER — HYDROCHLOROTHIAZIDE 12.5 MG/1
12.5 CAPSULE ORAL EVERY OTHER DAY
Qty: 45 CAPSULE | Refills: 1 | Status: SHIPPED | OUTPATIENT
Start: 2023-07-03 | End: 2024-01-15 | Stop reason: SDUPTHER

## 2023-07-03 NOTE — TELEPHONE ENCOUNTER
"Says she has taken the fluid pill in "quite a while", that Dr James told her she didn't have to take it anymore  (He prescribed it 10/2022, no notes saying she could stop it after that)  "

## 2023-07-03 NOTE — TELEPHONE ENCOUNTER
----- Message from Jean Lanier sent at 7/3/2023  8:59 AM CDT -----  Contact: Pt  Type:  Sooner Appointment Request    Caller is requesting a sooner appointment.  Caller declined first available appointment listed below.  Caller will not accept being placed on the waitlist and is requesting a message be sent to doctor.    Name of Caller:  Pt  When is the first available appointment?  7/19  Symptoms:  Issues with B/P  Best Call Back Number:  [phone]    Additional Information:  Pt was calling to request a sooner appt pt stated to please call if they can be squeezed in sometime this week Thank you

## 2023-07-03 NOTE — TELEPHONE ENCOUNTER
Please advise: pt's blood pressure has been running 160-170s/90s. This morning 165/93. She confirms taking diltiazem and metoprolol but is not taking the hctz

## 2023-07-03 NOTE — TELEPHONE ENCOUNTER
Spoke to pt and advised per Dr Latham (If blood pressure is running high again, would consider restarting, or can wait until Dr. James comes back.)

## 2023-08-14 ENCOUNTER — TELEPHONE (OUTPATIENT)
Dept: OBSTETRICS AND GYNECOLOGY | Facility: CLINIC | Age: 78
End: 2023-08-14
Payer: MEDICARE

## 2023-08-14 NOTE — TELEPHONE ENCOUNTER
----- Message from Lilli Mazariegos sent at 8/14/2023  4:03 PM CDT -----  Type:  Needs Medical Advice    Who Called: pt    Symptoms (please be specific): yeast infection     How long has patient had these symptoms:  3 days    Pharmacy name and phone #:    [facility] [place] [id number] [facility] [id number]  Phone: [phone] Fax: [phone]        Would the patient rather a call back or a response via [facility]? Call back    Best Call Back Number: [phone]      Additional Information: pt is hoping the Dr will call her in something for a yeast infection.      Please call Back to advise. Thanks!

## 2023-08-16 RX ORDER — METOPROLOL SUCCINATE 25 MG/1
25 TABLET, EXTENDED RELEASE ORAL DAILY
Qty: 90 TABLET | Refills: 1 | Status: SHIPPED | OUTPATIENT
Start: 2023-08-16

## 2023-12-14 ENCOUNTER — OFFICE VISIT (OUTPATIENT)
Dept: CARDIOLOGY | Facility: CLINIC | Age: 78
End: 2023-12-14
Payer: MEDICARE

## 2023-12-14 ENCOUNTER — LAB VISIT (OUTPATIENT)
Dept: LAB | Facility: HOSPITAL | Age: 78
End: 2023-12-14
Attending: INTERNAL MEDICINE
Payer: MEDICARE

## 2023-12-14 VITALS
HEART RATE: 101 BPM | SYSTOLIC BLOOD PRESSURE: 139 MMHG | RESPIRATION RATE: 18 BRPM | DIASTOLIC BLOOD PRESSURE: 82 MMHG | WEIGHT: 166.69 LBS | BODY MASS INDEX: 26.16 KG/M2 | HEIGHT: 67 IN

## 2023-12-14 DIAGNOSIS — R94.30 ELEVATED LEFT VENTRICULAR END-DIASTOLIC PRESSURE (LVEDP): Chronic | ICD-10-CM

## 2023-12-14 DIAGNOSIS — I10 ESSENTIAL HYPERTENSION: ICD-10-CM

## 2023-12-14 DIAGNOSIS — I65.23 CAROTID ARTERY PLAQUE, BILATERAL: Chronic | ICD-10-CM

## 2023-12-14 DIAGNOSIS — I34.0 NON-RHEUMATIC MITRAL REGURGITATION: Chronic | ICD-10-CM

## 2023-12-14 DIAGNOSIS — R60.0 BILATERAL LEG EDEMA: ICD-10-CM

## 2023-12-14 DIAGNOSIS — R60.0 BILATERAL LEG EDEMA: Primary | ICD-10-CM

## 2023-12-14 DIAGNOSIS — I10 ESSENTIAL HYPERTENSION: Chronic | ICD-10-CM

## 2023-12-14 DIAGNOSIS — R20.9 BILATERAL COLD FEET: ICD-10-CM

## 2023-12-14 LAB
ALBUMIN SERPL BCP-MCNC: 3.5 G/DL (ref 3.5–5.2)
ALP SERPL-CCNC: 53 U/L (ref 55–135)
ALT SERPL W/O P-5'-P-CCNC: 13 U/L (ref 10–44)
ANION GAP SERPL CALC-SCNC: 11 MMOL/L (ref 8–16)
AST SERPL-CCNC: 18 U/L (ref 10–40)
BILIRUB SERPL-MCNC: 0.3 MG/DL (ref 0.1–1)
BUN SERPL-MCNC: 18 MG/DL (ref 8–23)
CALCIUM SERPL-MCNC: 9.4 MG/DL (ref 8.7–10.5)
CHLORIDE SERPL-SCNC: 102 MMOL/L (ref 95–110)
CO2 SERPL-SCNC: 29 MMOL/L (ref 23–29)
CREAT SERPL-MCNC: 0.8 MG/DL (ref 0.5–1.4)
EST. GFR  (NO RACE VARIABLE): >60 ML/MIN/1.73 M^2
GLUCOSE SERPL-MCNC: 190 MG/DL (ref 70–110)
POTASSIUM SERPL-SCNC: 4.4 MMOL/L (ref 3.5–5.1)
PROT SERPL-MCNC: 7.1 G/DL (ref 6–8.4)
SODIUM SERPL-SCNC: 142 MMOL/L (ref 136–145)
T4 FREE SERPL-MCNC: 0.92 NG/DL (ref 0.71–1.51)
TSH SERPL DL<=0.005 MIU/L-ACNC: 0.26 UIU/ML (ref 0.4–4)

## 2023-12-14 PROCEDURE — 3079F DIAST BP 80-89 MM HG: CPT | Mod: CPTII,S$GLB,, | Performed by: INTERNAL MEDICINE

## 2023-12-14 PROCEDURE — 99214 PR OFFICE/OUTPT VISIT, EST, LEVL IV, 30-39 MIN: ICD-10-PCS | Mod: S$GLB,,, | Performed by: INTERNAL MEDICINE

## 2023-12-14 PROCEDURE — 1159F PR MEDICATION LIST DOCUMENTED IN MEDICAL RECORD: ICD-10-PCS | Mod: CPTII,S$GLB,, | Performed by: INTERNAL MEDICINE

## 2023-12-14 PROCEDURE — 84439 ASSAY OF FREE THYROXINE: CPT | Performed by: INTERNAL MEDICINE

## 2023-12-14 PROCEDURE — 3075F PR MOST RECENT SYSTOLIC BLOOD PRESS GE 130-139MM HG: ICD-10-PCS | Mod: CPTII,S$GLB,, | Performed by: INTERNAL MEDICINE

## 2023-12-14 PROCEDURE — 1125F PR PAIN SEVERITY QUANTIFIED, PAIN PRESENT: ICD-10-PCS | Mod: CPTII,S$GLB,, | Performed by: INTERNAL MEDICINE

## 2023-12-14 PROCEDURE — 1101F PR PT FALLS ASSESS DOC 0-1 FALLS W/OUT INJ PAST YR: ICD-10-PCS | Mod: CPTII,S$GLB,, | Performed by: INTERNAL MEDICINE

## 2023-12-14 PROCEDURE — 3288F PR FALLS RISK ASSESSMENT DOCUMENTED: ICD-10-PCS | Mod: CPTII,S$GLB,, | Performed by: INTERNAL MEDICINE

## 2023-12-14 PROCEDURE — 36415 COLL VENOUS BLD VENIPUNCTURE: CPT | Mod: PO | Performed by: INTERNAL MEDICINE

## 2023-12-14 PROCEDURE — 99999 PR PBB SHADOW E&M-EST. PATIENT-LVL IV: CPT | Mod: PBBFAC,,, | Performed by: INTERNAL MEDICINE

## 2023-12-14 PROCEDURE — 3079F PR MOST RECENT DIASTOLIC BLOOD PRESSURE 80-89 MM HG: ICD-10-PCS | Mod: CPTII,S$GLB,, | Performed by: INTERNAL MEDICINE

## 2023-12-14 PROCEDURE — 84443 ASSAY THYROID STIM HORMONE: CPT | Performed by: INTERNAL MEDICINE

## 2023-12-14 PROCEDURE — 1159F MED LIST DOCD IN RCRD: CPT | Mod: CPTII,S$GLB,, | Performed by: INTERNAL MEDICINE

## 2023-12-14 PROCEDURE — 1125F AMNT PAIN NOTED PAIN PRSNT: CPT | Mod: CPTII,S$GLB,, | Performed by: INTERNAL MEDICINE

## 2023-12-14 PROCEDURE — 99214 OFFICE O/P EST MOD 30 MIN: CPT | Mod: S$GLB,,, | Performed by: INTERNAL MEDICINE

## 2023-12-14 PROCEDURE — 3288F FALL RISK ASSESSMENT DOCD: CPT | Mod: CPTII,S$GLB,, | Performed by: INTERNAL MEDICINE

## 2023-12-14 PROCEDURE — 99999 PR PBB SHADOW E&M-EST. PATIENT-LVL IV: ICD-10-PCS | Mod: PBBFAC,,, | Performed by: INTERNAL MEDICINE

## 2023-12-14 PROCEDURE — 1101F PT FALLS ASSESS-DOCD LE1/YR: CPT | Mod: CPTII,S$GLB,, | Performed by: INTERNAL MEDICINE

## 2023-12-14 PROCEDURE — 3075F SYST BP GE 130 - 139MM HG: CPT | Mod: CPTII,S$GLB,, | Performed by: INTERNAL MEDICINE

## 2023-12-14 PROCEDURE — 80053 COMPREHEN METABOLIC PANEL: CPT | Performed by: INTERNAL MEDICINE

## 2023-12-14 RX ORDER — OLMESARTAN MEDOXOMIL 20 MG/1
20 TABLET ORAL DAILY
Qty: 90 TABLET | Refills: 0 | Status: SHIPPED | OUTPATIENT
Start: 2023-12-14 | End: 2024-03-13

## 2023-12-14 RX ORDER — METHYLPREDNISOLONE 4 MG/1
4 TABLET ORAL
COMMUNITY
End: 2024-01-15 | Stop reason: ALTCHOICE

## 2023-12-14 RX ORDER — HYDROCHLOROTHIAZIDE 12.5 MG/1
12.5 TABLET ORAL EVERY OTHER DAY
Qty: 15 TABLET | Refills: 2 | Status: ON HOLD | OUTPATIENT
Start: 2023-12-14 | End: 2024-01-31 | Stop reason: HOSPADM

## 2023-12-14 NOTE — PROGRESS NOTES
Subjective:    Patient ID:  Tayler Dominguez is a 78 y.o. female who presents for Follow-up and Leg Swelling        Follow-up  Associated symptoms include neck pain (GETS STEROIDS INJECTIONS). Pertinent negatives include no abdominal pain, chest pain, chills, congestion, coughing (RESOLVED), fever, nausea, rash or sore throat.       LAST , LEG EDEMA FOR 2 -3 WEEKS, RELATIVELY SUDDEN, SOME DISCOMFORT, TRY TO SEE PCP, BP OK, KIDNEY STONES NO ISSUES, FEET COLD, STARTED MEDROL PACK YESTERDAY PER DR EDWARDS  PAIN MANAGEMENTFOR LEG EDEMA, NOT TAKING HCTZ NOT SURE WHY, ,SEE ROS  Past Medical History:   Diagnosis Date    Allergy     Arthritis     hands    Breast disorder     Crescendo angina     Edema     Heart murmur     MVP    History of 2019 novel coronavirus disease (COVID-19) 01/26/2021    Hypertension     Kidney stones     Menopause     Migraine headache     Mitral regurgitation     Near syncope     Sinusitis     SOB (shortness of breath)     Supraventricular tachycardia      Past Surgical History:   Procedure Laterality Date    ABDOMINAL SURGERY      APPENDECTOMY      BREAST BIOPSY      CARDIAC CATHETERIZATION  2013    CATARACT EXTRACTION W/  INTRAOCULAR LENS IMPLANT Bilateral     COLONOSCOPY  11/15/2013    Done by Dr. ELTON Lawrence -- report in paper chart    CORONARY ANGIOGRAPHY N/A 08/18/2020    Procedure: ANGIOGRAM, CORONARY ARTERY;  Surgeon: Anjelica James MD;  Location: [facility] CATH;  Service: Cardiology;  Laterality: N/A;    HYSTERECTOMY      PARTIAL @ 27 YO, COMPLETE @ 32 YO    LEFT HEART CATHETERIZATION N/A 08/18/2020    Procedure: Left heart cath;  Surgeon: Anjelica James MD;  Location: [facility] CATH;  Service: Cardiology;  Laterality: N/A;    RADIOFREQUENCY ABLATION  2013    at [facility] heart      Family History   Problem Relation Age of Onset    Cancer Mother     Aneurysm Mother     Hypertension Mother     Heart disease Father     Hyperlipidemia Father     Hypertension Father     Diabetes Father     Kidney disease  Brother      Social History     Socioeconomic History    Marital status:    Tobacco Use    Smoking status: Never    Smokeless tobacco: Never   Substance and Sexual Activity    Alcohol use: No    Drug use: No       Review of patient's allergies indicates:   Allergen Reactions    Ambien [zolpidem]     Cephalosporins     Cleocin [clindamycin hcl]     Codeine     Erythropoietin analogues     Influenza vaccine tri-sp 09-10     Levaquin [levofloxacin]     Macrobid [nitrofurantoin monohyd/m-cryst]     Morphine     Pcn [penicillins]     Sudal [phenylephrine-pot guaiacolsulf]     Sulfa (sulfonamide antibiotics)        Current Outpatient Medications:     ascorbic acid, vitamin C, (VITAMIN C) 500 MG tablet, Take 500 mg by mouth once daily., Disp: , Rfl:     aspirin (ECOTRIN) 81 MG EC tablet, Take 81 mg by mouth. Every other day, Disp: , Rfl:     baclofen (LIORESAL) 10 MG tablet, Take 1 tablet (10 mg total) by mouth 3 (three) times daily., Disp: 30 tablet, Rfl: 2    butalbital-acetaminophen-caffeine -40 mg (FIORICET, ESGIC) -40 mg per tablet, Take by mouth., Disp: , Rfl:     estradioL (ESTRACE) 2 MG tablet, Take 1 tablet (2 mg total) by mouth once daily., Disp: 90 tablet, Rfl: 4    ketoconazole (NIZORAL) 2 % shampoo, APPLY TO AFFECTED AREA EXTERNALLY AS DIRECTED 3 TIMES A WEEK, Disp: , Rfl: 6    levocetirizine (XYZAL) 5 MG tablet, TAKE 1 TABLET (5 MG TOTAL) BY MOUTH EVERY EVENING., Disp: 90 tablet, Rfl: 3    meloxicam (MOBIC) 7.5 MG tablet, Take 1 tablet (7.5 mg total) by mouth once daily., Disp: 20 tablet, Rfl: 1    methylPREDNISolone (MEDROL DOSEPACK) 4 mg tablet, Take 4 mg by mouth. use as directed, Disp: , Rfl:     metoprolol succinate (TOPROL-XL) 25 MG 24 hr tablet, TAKE 1 TABLET (25 MG TOTAL) BY MOUTH ONCE DAILY., Disp: 90 tablet, Rfl: 1    nitroGLYCERIN (NITROSTAT) 0.4 MG SL tablet, Place 1 tablet (0.4 mg total) under the tongue every 5 (five) minutes as needed., Disp: 25 tablet, Rfl: 0    ondansetron  "(ZOFRAN) 4 MG tablet, Take 1 tablet (4 mg total) by mouth every 8 (eight) hours as needed for Nausea., Disp: 25 tablet, Rfl: 2    vitamin D (VITAMIN D3) 1000 units Tab, Take 1,000 Units by mouth once daily., Disp: , Rfl:     hydroCHLOROthiazide (HYDRODIURIL) 12.5 MG Tab, Take 1 tablet (12.5 mg total) by mouth every other day., Disp: 15 tablet, Rfl: 2    hydroCHLOROthiazide (MICROZIDE) 12.5 mg capsule, Take 1 capsule (12.5 mg total) by mouth every other day., Disp: 45 capsule, Rfl: 1    olmesartan (BENICAR) 20 MG tablet, Take 1 tablet (20 mg total) by mouth once daily., Disp: 90 tablet, Rfl: 0    Review of Systems   Constitutional: Negative. Negative for chills and fever.   HENT:  Negative for congestion and sore throat.    Eyes:  Negative for blurred vision and vision loss in right eye.   Cardiovascular:  Positive for leg swelling. Negative for chest pain, claudication, cyanosis, dyspnea on exertion (OCC), irregular heartbeat, near-syncope, orthopnea, palpitations, paroxysmal nocturnal dyspnea and syncope.   Respiratory:  Negative for cough (RESOLVED), hemoptysis, shortness of breath and wheezing (RESOLVED).    Hematologic/Lymphatic: Negative.    Skin:  Negative for color change and rash.   Musculoskeletal:  Positive for back pain, joint pain and neck pain (GETS STEROIDS INJECTIONS). Negative for falls.   Gastrointestinal:  Negative for abdominal pain, dysphagia, jaundice, melena and nausea.   Genitourinary:  Negative for dysuria and flank pain.   Neurological:  Negative for dizziness, focal weakness, light-headedness and loss of balance.   Psychiatric/Behavioral:  Negative for altered mental status and depression.    Allergic/Immunologic: Negative for persistent infections.        Objective:      Vitals:    12/14/23 1345 12/14/23 1358   BP: (!) 151/85 139/82   Pulse: 101    Resp: 18    Weight: 75.6 kg (166 lb 10.7 oz)    Height: 5' 7" (1.702 m)    PainSc:   7    PainLoc: Leg      Body mass index is 26.1 " kg/m².    Physical Exam  Constitutional:       Appearance: Normal appearance.   HENT:      Head: Normocephalic and atraumatic.   Eyes:      General: No scleral icterus.     Extraocular Movements: Extraocular movements intact.   Neck:      Vascular: No carotid bruit.   Cardiovascular:      Rate and Rhythm: Normal rate and regular rhythm. No extrasystoles are present.     Pulses: Normal pulses.           Carotid pulses are 2+ on the right side and 2+ on the left side.       Radial pulses are 2+ on the right side and 2+ on the left side.        Posterior tibial pulses are 2+ on the right side and 2+ on the left side.      Heart sounds: Murmur heard.      Systolic murmur is present with a grade of 1/6 at the lower left sternal border and apex.      No friction rub. No gallop.   Pulmonary:      Effort: Pulmonary effort is normal.      Breath sounds: Normal breath sounds and air entry.   Abdominal:      Palpations: Abdomen is soft.      Tenderness: There is no abdominal tenderness.   Musculoskeletal:      Cervical back: Neck supple.      Right lower le+ Edema present.      Left lower le+ Pitting Edema present.   Skin:     Capillary Refill: Capillary refill takes less than 2 seconds.   Neurological:      General: No focal deficit present.      Mental Status: She is alert and oriented to person, place, and time.      Cranial Nerves: Cranial nerves 2-12 are intact. No cranial nerve deficit.   Psychiatric:         Mood and Affect: Mood normal.         Speech: Speech normal.         Behavior: Behavior normal.               ..    Chemistry        Component Value Date/Time     2023 1432    K 4.4 2023 1432     2023 1432    CO2 29 2023 1432    BUN 18 2023 1432    CREATININE 0.8 2023 1432     (H) 2023 1432        Component Value Date/Time    CALCIUM 9.4 2023 1432    ALKPHOS 53 (L) 2023 1432    AST 18 2023 1432    AST 37 (H) 2016 2014    ALT  13 12/14/2023 1432    BILITOT 0.3 12/14/2023 1432    ESTGFRAFRICA >60.0 11/24/2021 1100    EGFRNONAA >60.0 11/24/2021 1100            ..  Lab Results   Component Value Date    CHOL 209 (H) 02/08/2023    CHOL 218 (H) 08/19/2022    CHOL 183 11/24/2021     Lab Results   Component Value Date    HDL 64 02/08/2023    HDL 76 (H) 08/19/2022    HDL 72 11/24/2021     Lab Results   Component Value Date    LDLCALC 107.0 02/08/2023    LDLCALC 120.8 08/19/2022    LDLCALC 82.8 11/24/2021     Lab Results   Component Value Date    TRIG 190 (H) 02/08/2023    TRIG 106 08/19/2022    TRIG 141 11/24/2021     Lab Results   Component Value Date    CHOLHDL 30.6 02/08/2023    CHOLHDL 34.9 08/19/2022    CHOLHDL 39.3 11/24/2021     ..  Lab Results   Component Value Date    WBC 8.97 02/08/2023    HGB 13.0 02/08/2023    HCT 41.3 02/08/2023    MCV 95 02/08/2023     02/08/2023       Test(s) Reviewed  I have reviewed the following in detail:  [] Stress test   [] Angiography   [] Echocardiogram   [x] Labs   [] Other:       Assessment:         ICD-10-CM ICD-9-CM   1. Bilateral leg edema  R60.0 782.3   2. Bilateral cold feet  R20.9 782.9   3. Non-rheumatic mitral regurgitation  I34.0 424.0   4. Carotid artery plaque, bilateral  I65.23 433.10     433.30   5. Elevated left ventricular end-diastolic pressure (LVEDP)  R94.30 794.30   6. Essential hypertension  I10 401.9     Problem List Items Addressed This Visit          Neuro    Bilateral cold feet    Relevant Orders    CV Ultrasound doppler arterial legs bilat       Cardiac/Vascular    Non-rheumatic mitral regurgitation    Essential hypertension    Relevant Orders    Comprehensive Metabolic Panel (Completed)    Elevated left ventricular end-diastolic pressure (LVEDP)    Carotid artery plaque, bilateral       Other    Bilateral leg edema - Primary    Relevant Orders    Comprehensive Metabolic Panel (Completed)    TSH (Completed)    CV Ultrasound doppler arterial legs bilat    CV Ultrasound  doppler venous legs bilat        Plan:      DC DILTIAZEM CALCIUM CHANNEL BLOCKER IN VIEW OF EDEMA AS A POSSIBLE ETIOLOGY RESTART HYDROCHLOROTHIAZIDE EVERY OTHER DAY START BENICAR 20 MG INSTEAD OF DILTIAZEM, CHECK LABS INCLUDING CMP AND TSH, CHECK ARTERIAL AND VENOUS DOPPLER, NO ANGINA NO OVERT HEART FAILURE NO TIA TYPE SYMPTOM DIET EXERCISE WALKING EXERCISE RETURN TO CLINIC IN FEW WEEKS AFTER TESTS      Bilateral leg edema  -     Comprehensive Metabolic Panel; Future; Expected date: 12/14/2023  -     TSH; Future; Expected date: 12/14/2023  -     CV Ultrasound doppler arterial legs bilat; Future; Expected date: 01/14/2024  -     CV Ultrasound doppler venous legs bilat; Future; Expected date: 01/14/2024    Bilateral cold feet  -     CV Ultrasound doppler arterial legs bilat; Future; Expected date: 01/14/2024    Non-rheumatic mitral regurgitation    Carotid artery plaque, bilateral    Elevated left ventricular end-diastolic pressure (LVEDP)    Essential hypertension  -     Comprehensive Metabolic Panel; Future; Expected date: 12/14/2023    Other orders  -     olmesartan (BENICAR) 20 MG tablet; Take 1 tablet (20 mg total) by mouth once daily.  Dispense: 90 tablet; Refill: 0  -     hydroCHLOROthiazide (HYDRODIURIL) 12.5 MG Tab; Take 1 tablet (12.5 mg total) by mouth every other day.  Dispense: 15 tablet; Refill: 2    RTC Low level/low impact aerobic exercise 5x's/wk. Heart healthy diet and risk factor modification.    See labs and med orders.    Aerobic exercise 5x's/wk. Heart healthy diet and risk factor modification.    See labs and med orders.

## 2024-01-15 ENCOUNTER — TELEPHONE (OUTPATIENT)
Dept: CARDIOLOGY | Facility: CLINIC | Age: 79
End: 2024-01-15
Payer: MEDICARE

## 2024-01-16 ENCOUNTER — CLINICAL SUPPORT (OUTPATIENT)
Dept: CARDIOLOGY | Facility: HOSPITAL | Age: 79
End: 2024-01-16
Attending: INTERNAL MEDICINE
Payer: MEDICARE

## 2024-01-16 DIAGNOSIS — R60.0 BILATERAL LEG EDEMA: ICD-10-CM

## 2024-01-16 DIAGNOSIS — R20.9 BILATERAL COLD FEET: ICD-10-CM

## 2024-01-16 LAB
LEFT ANT TIBIAL SYS PSV: 53 CM/S
LEFT CFA PSV: 90 CM/S
LEFT PERONEAL SYS PSV: 53 CM/S
LEFT POPLITEAL PSV: 64 CM/S
LEFT POST TIBIAL SYS PSV: 60 CM/S
LEFT PROFUNDA SYS PSV: 64 CM/S
LEFT SUPER FEMORAL DIST SYS PSV: 78 CM/S
LEFT SUPER FEMORAL MID SYS PSV: 111 CM/S
LEFT SUPER FEMORAL OSTIAL SYS PSV: 99 CM/S
LEFT SUPER FEMORAL PROX SYS PSV: 96 CM/S
LEFT TIB/PER TRUNK SYS PSV: 101 CM/S
OHS CV LEFT LOWER EXTREMITY ABI (NO CALC): 1.08
OHS CV RIGHT ABI LOWER EXTREMITY (NO CALC): 1.01
RIGHT ANT TIBIAL SYS PSV: 52 CM/S
RIGHT CFA PSV: 103 CM/S
RIGHT PERONEAL SYS PSV: 56 CM/S
RIGHT POPLITEAL PSV: 60 CM/S
RIGHT POST TIBIAL SYS PSV: 47 CM/S
RIGHT PROFUNDA SYS PSV: 86 CM/S
RIGHT SUPER FEMORAL DIST SYS PSV: 67 CM/S
RIGHT SUPER FEMORAL MID SYS PSV: 98 CM/S
RIGHT SUPER FEMORAL OSTIAL SYS PSV: 92 CM/S
RIGHT SUPER FEMORAL PROX SYS PSV: 100 CM/S
RIGHT TIB/PER TRUNK SYS PSV: 103 CM/S

## 2024-01-16 PROCEDURE — 93925 LOWER EXTREMITY STUDY: CPT | Mod: PO

## 2024-01-16 PROCEDURE — 93925 LOWER EXTREMITY STUDY: CPT | Mod: 26,,, | Performed by: INTERNAL MEDICINE

## 2024-01-18 ENCOUNTER — TELEPHONE (OUTPATIENT)
Dept: CARDIOLOGY | Facility: CLINIC | Age: 79
End: 2024-01-18
Payer: MEDICARE

## 2024-01-18 NOTE — TELEPHONE ENCOUNTER
----- Message from Katarzyna Beck sent at 1/18/2024  2:49 PM CST -----  Regarding: results  Contact: patient  Type:  Test Results    Who Called:  patient  Name of Test (Lab/Mammo/Etc):  ultrasound and labs  Date of Test:  01/16/24  Ordering Provider:  Dr. James  Where the test was performed:  [facility]  Best Call Back Number:  [phone]    Additional Information:  Please call patient to advise.  Thanks!

## 2024-01-19 ENCOUNTER — TELEPHONE (OUTPATIENT)
Dept: CARDIOLOGY | Facility: CLINIC | Age: 79
End: 2024-01-19
Payer: MEDICARE

## 2024-01-19 NOTE — TELEPHONE ENCOUNTER
----- Message from Jess Ford MA sent at 1/19/2024  9:20 AM CST -----  Type:  Patient Returning Call    Who Called:  pt   Who Left Message for Patient:  [facility] nurse  Does the patient know what this is regarding?:  yes  Best Call Back Number:  [phone]    Additional Information:  please advise

## 2024-01-29 PROBLEM — I48.91 A-FIB: Status: ACTIVE | Noted: 2024-01-29

## 2024-01-29 PROBLEM — I26.99 ACUTE PULMONARY EMBOLISM WITHOUT ACUTE COR PULMONALE: Status: ACTIVE | Noted: 2024-01-29

## 2024-01-29 PROBLEM — I50.9 CHF (CONGESTIVE HEART FAILURE): Status: ACTIVE | Noted: 2024-01-29

## 2024-01-30 PROBLEM — I48.92 ATRIAL FLUTTER: Status: ACTIVE | Noted: 2024-01-30

## 2024-01-30 PROBLEM — I48.91 A-FIB: Status: RESOLVED | Noted: 2024-01-29 | Resolved: 2024-01-30

## 2024-02-07 PROBLEM — L82.0 SEBORRHEIC KERATOSIS, INFLAMED: Status: ACTIVE | Noted: 2024-02-07

## 2024-02-07 PROBLEM — C44.111 BASAL CELL CARCINOMA OF EYELID: Status: ACTIVE | Noted: 2024-02-07

## 2024-02-07 PROBLEM — Z85.828 HISTORY OF MALIGNANT NEOPLASM OF SKIN: Status: ACTIVE | Noted: 2024-02-07

## 2024-02-07 PROBLEM — L60.3 NAIL DYSTROPHY: Status: ACTIVE | Noted: 2024-02-07

## 2024-02-14 NOTE — PROGRESS NOTES
Subjective:    Patient ID:  Tayler Dominguez is a 78 y.o. female who presents for Hospital Follow Up and Congestive Heart Failure        HPI    STATUS POST ST PH WITH PULMONARY EMBOLISM SEGMENTAL, PA PRESSURE WAS 43 MM OF MERCURY, ECHO NORMAL LV FUNCTION MILD MR HAD AFIB WITH RVR/ATYPICAL FLUTTER, DOING OK, OCC PALP, LESS SOB, PATIENT AND DAUGHTER WERE CONFUSED AND NOT WELL INFORMED WITH ALL THE TESTING AND RESULTS WHEN WELL IN THE HOSPITAL, VERY LONG DISCUSSION, SEE ROS  Past Medical History:   Diagnosis Date    Allergy     Arthritis     hands    Breast disorder     Crescendo angina     Edema     Heart murmur     MVP    History of 2019 novel coronavirus disease (COVID-19) 01/26/2021    Hypertension     Kidney stones     Menopause     Migraine headache     Mitral regurgitation     Near syncope     Sinusitis     SOB (shortness of breath)     Supraventricular tachycardia      Past Surgical History:   Procedure Laterality Date    ABDOMINAL SURGERY      APPENDECTOMY      BREAST BIOPSY      CARDIAC CATHETERIZATION  2013    CATARACT EXTRACTION W/  INTRAOCULAR LENS IMPLANT Bilateral     COLONOSCOPY  11/15/2013    Done by Dr. ELTON Lawrence -- report in paper chart    CORONARY ANGIOGRAPHY N/A 08/18/2020    Procedure: ANGIOGRAM, CORONARY ARTERY;  Surgeon: Anjelica James MD;  Location: [facility] CATH;  Service: Cardiology;  Laterality: N/A;    HYSTERECTOMY      PARTIAL @ 29 YO, COMPLETE @ 30 YO    LEFT HEART CATHETERIZATION N/A 08/18/2020    Procedure: Left heart cath;  Surgeon: Anjelica James MD;  Location: [facility] CATH;  Service: Cardiology;  Laterality: N/A;    RADIOFREQUENCY ABLATION  2013    at [facility] heart      Family History   Problem Relation Age of Onset    Cancer Mother     Aneurysm Mother     Hypertension Mother     Heart disease Father     Hyperlipidemia Father     Hypertension Father     Diabetes Father     Heart disease Sister     Heart disease Sister     Heart disease Brother     Kidney disease Brother     Heart disease Brother      Cancer Brother      Social History     Socioeconomic History    Marital status:    Tobacco Use    Smoking status: Never     Passive exposure: Never    Smokeless tobacco: Never   Substance and Sexual Activity    Alcohol use: No    Drug use: No     Social Determinants of Health     Financial Resource Strain: Low Risk  (1/29/2024)    Overall Financial Resource Strain (CARDIA)     Difficulty of Paying Living Expenses: Not hard at all   Food Insecurity: No Food Insecurity (1/30/2024)    Hunger Vital Sign     Worried About Running Out of Food in the Last Year: Never true     Ran Out of Food in the Last Year: Never true   Transportation Needs: No Transportation Needs (1/30/2024)    PRAPARE - Transportation     Lack of Transportation (Medical): No     Lack of Transportation (Non-Medical): No   Physical Activity: Inactive (1/29/2024)    Exercise Vital Sign     Days of Exercise per Week: 0 days     Minutes of Exercise per Session: 0 min   Stress: No Stress Concern Present (1/29/2024)    [employer] [place] [place] [employer] - Occupational Stress Questionnaire     Feeling of Stress : Only a little   Social Connections: Moderately Isolated (1/29/2024)    Social Connection and Isolation Panel [NHANES]     Frequency of Communication with Friends and Family: Twice a week     Frequency of Social Gatherings with Friends and Family: Never     Attends Taoist Services: More than 4 times per year     Active Member of Clubs or Organizations: No     Attends Club or Organization Meetings: Never     Marital Status:    Housing Stability: Low Risk  (1/30/2024)    Housing Stability Vital Sign     Unable to Pay for Housing in the Last Year: No     Number of Places Lived in the Last Year: 1     Unstable Housing in the Last Year: No       Review of patient's allergies indicates:   Allergen Reactions    Ambien [zolpidem]     Cephalosporins     Cleocin [clindamycin hcl]     Codeine     Erythropoietin analogues     Influenza  vaccine tri-sp 09-10     Levaquin [levofloxacin]     Macrobid [nitrofurantoin monohyd/m-cryst]     Morphine     Pcn [penicillins]     Sudal [phenylephrine-pot guaiacolsulf]     Sulfa (sulfonamide antibiotics)        Current Outpatient Medications:     albuterol (PROVENTIL) 2.5 mg /3 mL (0.083 %) nebulizer solution, Take 3 mLs (2.5 mg total) by nebulization every 6 (six) hours as needed for Wheezing. Rescue, Disp: 30 mL, Rfl: 0    apixaban (ELIQUIS DVT-PE TREAT 30D START) 5 mg (74 tabs) DsPk, For the first 7 days take two 5 mg tablets twice daily.  After 7 days take one 5 mg tablet twice daily., Disp: 100 tablet, Rfl: 1    ascorbic acid, vitamin C, (VITAMIN C) 500 MG tablet, Take 500 mg by mouth daily as needed (during winter months)., Disp: , Rfl:     butalbital-acetaminophen-caffeine -40 mg (FIORICET, ESGIC) -40 mg per tablet, Take 1 tablet by mouth every 6 (six) hours as needed for Headaches., Disp: , Rfl:     cholecalciferol, vitamin D3, 50 mcg (2,000 unit) TbDL, Take 2,000 Units by mouth every evening., Disp: , Rfl:     estradioL (ESTRACE) 1 MG tablet, Take 1 tablet (1 mg total) by mouth once daily. (Patient taking differently: Take 1 mg by mouth every evening.), Disp: 30 tablet, Rfl: 0    ipratropium (ATROVENT) 21 mcg (0.03 %) nasal spray, 2 sprays by Each Nostril route 3 (three) times daily., Disp: 30 mL, Rfl: 2    ketoconazole (NIZORAL) 2 % shampoo, Apply 1 application  topically 3 (three) times a week., Disp: , Rfl: 6    levocetirizine (XYZAL) 5 MG tablet, TAKE 1 TABLET (5 MG TOTAL) BY MOUTH EVERY EVENING., Disp: 90 tablet, Rfl: 3    meloxicam (MOBIC) 7.5 MG tablet, Take 1 tablet (7.5 mg total) by mouth once daily., Disp: 20 tablet, Rfl: 1    metoprolol succinate (TOPROL-XL) 25 MG 24 hr tablet, TAKE 1 TABLET (25 MG TOTAL) BY MOUTH ONCE DAILY. (Patient taking differently: Take 25 mg by mouth every evening.), Disp: 90 tablet, Rfl: 1    nitroGLYCERIN (NITROSTAT) 0.4 MG SL tablet, Place 1 tablet (0.4  mg total) under the tongue every 5 (five) minutes as needed., Disp: 25 tablet, Rfl: 0    olmesartan (BENICAR) 20 MG tablet, Take 1 tablet (20 mg total) by mouth once daily. (Patient taking differently: Take 20 mg by mouth every evening.), Disp: 90 tablet, Rfl: 0    ondansetron (ZOFRAN) 4 MG tablet, Take 1 tablet (4 mg total) by mouth every 8 (eight) hours as needed for Nausea., Disp: 25 tablet, Rfl: 2    petrolat,wht/min oil/sod chl (DRY EYES OPHT), Place 1 drop into both eyes daily as needed (dry eyes)., Disp: , Rfl:     furosemide (LASIX) 20 MG tablet, Take 1 tablet (20 mg total) by mouth every other day., Disp: 45 tablet, Rfl: 1    Review of Systems   Constitutional: Negative. Negative for chills and fever.   HENT:  Negative for congestion and nosebleeds.    Eyes:  Negative for blurred vision and vision loss in right eye.   Cardiovascular:  Negative for chest pain, claudication, cyanosis, dyspnea on exertion (MILD), irregular heartbeat, leg swelling, near-syncope, orthopnea, palpitations (OCC), paroxysmal nocturnal dyspnea and syncope.   Respiratory:  Negative for cough (RESOLVED), hemoptysis, shortness of breath and wheezing.    Hematologic/Lymphatic: Negative for bleeding problem. Does not bruise/bleed easily.   Skin:  Negative for color change and rash.   Musculoskeletal:  Positive for arthritis and joint pain. Negative for back pain, falls and neck pain (GETS STEROIDS INJECTIONS).        SENSITIVE LEGS   Gastrointestinal:  Negative for abdominal pain, dysphagia, jaundice, melena and nausea.   Genitourinary:  Negative for dysuria and flank pain.   Neurological:  Negative for dizziness, focal weakness, light-headedness, loss of balance and paresthesias.   Psychiatric/Behavioral:  Negative for altered mental status and depression.    Allergic/Immunologic: Negative for persistent infections.        Objective:      Vitals:    02/15/24 1233   BP: 134/73   Pulse: (!) 50   Weight: 74.6 kg (164 lb 7.4 oz)   Height: 5'  "7" (1.702 m)   PainSc: 0-No pain     Body mass index is 25.76 kg/m².    Physical Exam  Constitutional:       Appearance: Normal appearance.   HENT:      Head: Normocephalic and atraumatic.   Eyes:      General: No scleral icterus.     Extraocular Movements: Extraocular movements intact.   Neck:      Vascular: No carotid bruit.   Cardiovascular:      Rate and Rhythm: Normal rate and regular rhythm. No extrasystoles are present.     Pulses: Normal pulses.           Carotid pulses are 2+ on the right side and 2+ on the left side.       Radial pulses are 2+ on the right side and 2+ on the left side.        Posterior tibial pulses are 2+ on the right side and 2+ on the left side.      Heart sounds: Murmur heard.      Systolic murmur is present with a grade of 1/6 at the lower left sternal border.      No friction rub. No gallop.   Pulmonary:      Effort: Pulmonary effort is normal.      Breath sounds: Normal breath sounds and air entry.   Abdominal:      Palpations: Abdomen is soft.      Tenderness: There is no abdominal tenderness.   Musculoskeletal:      Cervical back: Neck supple.      Right lower leg: No edema.      Left lower leg: No edema.   Skin:     Capillary Refill: Capillary refill takes less than 2 seconds.   Neurological:      General: No focal deficit present.      Mental Status: She is alert and oriented to person, place, and time.      Cranial Nerves: Cranial nerves 2-12 are intact.   Psychiatric:         Mood and Affect: Mood normal.         Speech: Speech normal.         Behavior: Behavior normal.                 ..    Chemistry        Component Value Date/Time     01/31/2024 0539    K 3.6 01/31/2024 0539     01/31/2024 0539    CO2 32 (H) 01/31/2024 0539    BUN 19 (H) 01/31/2024 0539    CREATININE 0.81 01/31/2024 0539    GLU 85 01/31/2024 0539        Component Value Date/Time    CALCIUM 8.8 01/31/2024 0539    ALKPHOS 66 01/29/2024 1320    AST 30 01/29/2024 1320    AST 37 (H) 04/18/2016 2014 "    ALT 22 01/29/2024 1320    BILITOT 0.7 01/29/2024 1320    ESTGFRAFRICA >60.0 11/24/2021 1100    EGFRNONAA >60.0 11/24/2021 1100            ..  Lab Results   Component Value Date    CHOL 171 01/30/2024    CHOL 209 (H) 02/08/2023    CHOL 218 (H) 08/19/2022     Lab Results   Component Value Date    HDL 59 01/30/2024    HDL 64 02/08/2023    HDL 76 (H) 08/19/2022     Lab Results   Component Value Date    LDLCALC 82.6 01/30/2024    LDLCALC 107.0 02/08/2023    LDLCALC 120.8 08/19/2022     Lab Results   Component Value Date    TRIG 147 01/30/2024    TRIG 190 (H) 02/08/2023    TRIG 106 08/19/2022     Lab Results   Component Value Date    CHOLHDL 34.5 01/30/2024    CHOLHDL 30.6 02/08/2023    CHOLHDL 34.9 08/19/2022     ..  Lab Results   Component Value Date    WBC 11.00 01/29/2024    HGB 14.3 01/29/2024    HCT 44.2 01/29/2024    MCV 93 01/29/2024     01/29/2024       Test(s) Reviewed  I have reviewed the following in detail:  [] Stress test   [] Angiography   [x] Echocardiogram   [x] Labs   [x] Other:       Assessment:         ICD-10-CM ICD-9-CM   1. PAF (paroxysmal atrial fibrillation)  I48.0 427.31   2. Chronic diastolic heart failure  I50.32 428.32   3. Other chronic pulmonary embolism without acute cor pulmonale  I27.82 416.2   4. Non-rheumatic mitral regurgitation  I34.0 424.0   5. Multiple pulmonary emboli  I26.99 415.19   6. Long term (current) use of anticoagulants  Z79.01 V58.61     Problem List Items Addressed This Visit          Cardiac/Vascular    Non-rheumatic mitral regurgitation    PAF (paroxysmal atrial fibrillation) - Primary    Relevant Orders    Holter monitor - 72 hour    Ambulatory referral/consult to Cardiac Electrophysiology    Comprehensive Metabolic Panel    Hemoglobin    Chronic diastolic heart failure    Relevant Orders    Ambulatory referral/consult to Cardiac Electrophysiology    Comprehensive Metabolic Panel    BNP       Hematology    Multiple pulmonary emboli    Long term (current) use  of anticoagulants    Relevant Orders    Comprehensive Metabolic Panel    Hemoglobin     Other Visit Diagnoses       Other chronic pulmonary embolism without acute cor pulmonale                 Plan:     LASIX QOD, ELIQUIS 5 BID, 72 H HOLTER, ASSESS ARRHYTHMIA, CONSIDER RFA, DAILY WEIGHT 2 LB PER DAY 5 LB PER WEEK RULE EXPLAINED TO THE PATIENT AND HER DAUGHTER VERY LONG DISCUSSION ABOUT ALL THE TESTING DISEASE PROCESS BOTH EXPRESSED UNDERSTANDING NOW , CLASS 2 HEART FAILURE STABLE CLINICAL ARRHYTHMIA INCREASE ACTIVITY WALKING EXERCISE, RETURN TO CLINIC IN 3 MO WITH LABS      PAF (paroxysmal atrial fibrillation)  -     Holter monitor - 72 hour; Future  -     Ambulatory referral/consult to Cardiac Electrophysiology; Future; Expected date: 02/22/2024  -     Comprehensive Metabolic Panel; Future; Expected date: 05/15/2024  -     Hemoglobin; Future; Expected date: 05/15/2024    Chronic diastolic heart failure  -     Ambulatory referral/consult to Cardiac Electrophysiology; Future; Expected date: 02/22/2024  -     Comprehensive Metabolic Panel; Future; Expected date: 05/15/2024  -     BNP; Future; Expected date: 05/15/2024    Other chronic pulmonary embolism without acute cor pulmonale    Non-rheumatic mitral regurgitation    Multiple pulmonary emboli    Long term (current) use of anticoagulants  -     Comprehensive Metabolic Panel; Future; Expected date: 05/15/2024  -     Hemoglobin; Future; Expected date: 05/15/2024    Other orders  -     furosemide (LASIX) 20 MG tablet; Take 1 tablet (20 mg total) by mouth every other day.  Dispense: 45 tablet; Refill: 1    RTC Low level/low impact aerobic exercise 5x's/wk. Heart healthy diet and risk factor modification.    See labs and med orders.    Aerobic exercise 5x's/wk. Heart healthy diet and risk factor modification.    See labs and med orders.

## 2024-02-15 ENCOUNTER — OFFICE VISIT (OUTPATIENT)
Dept: CARDIOLOGY | Facility: CLINIC | Age: 79
End: 2024-02-15
Payer: MEDICARE

## 2024-02-15 VITALS
HEART RATE: 50 BPM | WEIGHT: 164.44 LBS | HEIGHT: 67 IN | SYSTOLIC BLOOD PRESSURE: 134 MMHG | DIASTOLIC BLOOD PRESSURE: 73 MMHG | BODY MASS INDEX: 25.81 KG/M2

## 2024-02-15 DIAGNOSIS — I26.99 MULTIPLE PULMONARY EMBOLI: Chronic | ICD-10-CM

## 2024-02-15 DIAGNOSIS — I50.32 CHRONIC DIASTOLIC HEART FAILURE: Chronic | ICD-10-CM

## 2024-02-15 DIAGNOSIS — Z79.01 LONG TERM (CURRENT) USE OF ANTICOAGULANTS: Chronic | ICD-10-CM

## 2024-02-15 DIAGNOSIS — I34.0 NON-RHEUMATIC MITRAL REGURGITATION: Chronic | ICD-10-CM

## 2024-02-15 DIAGNOSIS — I27.82 OTHER CHRONIC PULMONARY EMBOLISM WITHOUT ACUTE COR PULMONALE: ICD-10-CM

## 2024-02-15 DIAGNOSIS — I48.0 PAF (PAROXYSMAL ATRIAL FIBRILLATION): Primary | ICD-10-CM

## 2024-02-15 PROCEDURE — 1159F MED LIST DOCD IN RCRD: CPT | Mod: CPTII,S$GLB,, | Performed by: INTERNAL MEDICINE

## 2024-02-15 PROCEDURE — 1111F DSCHRG MED/CURRENT MED MERGE: CPT | Mod: CPTII,S$GLB,, | Performed by: INTERNAL MEDICINE

## 2024-02-15 PROCEDURE — 3078F DIAST BP <80 MM HG: CPT | Mod: CPTII,S$GLB,, | Performed by: INTERNAL MEDICINE

## 2024-02-15 PROCEDURE — 3288F FALL RISK ASSESSMENT DOCD: CPT | Mod: CPTII,S$GLB,, | Performed by: INTERNAL MEDICINE

## 2024-02-15 PROCEDURE — 99214 OFFICE O/P EST MOD 30 MIN: CPT | Mod: S$GLB,,, | Performed by: INTERNAL MEDICINE

## 2024-02-15 PROCEDURE — 99999 PR PBB SHADOW E&M-EST. PATIENT-LVL IV: CPT | Mod: PBBFAC,,, | Performed by: INTERNAL MEDICINE

## 2024-02-15 PROCEDURE — 3075F SYST BP GE 130 - 139MM HG: CPT | Mod: CPTII,S$GLB,, | Performed by: INTERNAL MEDICINE

## 2024-02-15 PROCEDURE — 1126F AMNT PAIN NOTED NONE PRSNT: CPT | Mod: CPTII,S$GLB,, | Performed by: INTERNAL MEDICINE

## 2024-02-15 PROCEDURE — 1101F PT FALLS ASSESS-DOCD LE1/YR: CPT | Mod: CPTII,S$GLB,, | Performed by: INTERNAL MEDICINE

## 2024-02-15 RX ORDER — FUROSEMIDE 20 MG/1
20 TABLET ORAL EVERY OTHER DAY
Qty: 45 TABLET | Refills: 1 | Status: SHIPPED | OUTPATIENT
Start: 2024-02-15 | End: 2025-02-14

## 2024-02-21 ENCOUNTER — OFFICE VISIT (OUTPATIENT)
Dept: OBSTETRICS AND GYNECOLOGY | Facility: CLINIC | Age: 79
End: 2024-02-21
Payer: MEDICARE

## 2024-02-21 VITALS
DIASTOLIC BLOOD PRESSURE: 100 MMHG | SYSTOLIC BLOOD PRESSURE: 158 MMHG | BODY MASS INDEX: 26.03 KG/M2 | WEIGHT: 166.25 LBS

## 2024-02-21 DIAGNOSIS — Z12.31 SCREENING MAMMOGRAM FOR BREAST CANCER: ICD-10-CM

## 2024-02-21 DIAGNOSIS — Z01.419 ENCOUNTER FOR ANNUAL ROUTINE GYNECOLOGICAL EXAMINATION: Primary | ICD-10-CM

## 2024-02-21 PROCEDURE — 1160F RVW MEDS BY RX/DR IN RCRD: CPT | Mod: CPTII,S$GLB,, | Performed by: OBSTETRICS & GYNECOLOGY

## 2024-02-21 PROCEDURE — 99999 PR PBB SHADOW E&M-EST. PATIENT-LVL IV: CPT | Mod: PBBFAC,,, | Performed by: OBSTETRICS & GYNECOLOGY

## 2024-02-21 PROCEDURE — 3077F SYST BP >= 140 MM HG: CPT | Mod: CPTII,S$GLB,, | Performed by: OBSTETRICS & GYNECOLOGY

## 2024-02-21 PROCEDURE — 3288F FALL RISK ASSESSMENT DOCD: CPT | Mod: CPTII,S$GLB,, | Performed by: OBSTETRICS & GYNECOLOGY

## 2024-02-21 PROCEDURE — 1101F PT FALLS ASSESS-DOCD LE1/YR: CPT | Mod: CPTII,S$GLB,, | Performed by: OBSTETRICS & GYNECOLOGY

## 2024-02-21 PROCEDURE — 3080F DIAST BP >= 90 MM HG: CPT | Mod: CPTII,S$GLB,, | Performed by: OBSTETRICS & GYNECOLOGY

## 2024-02-21 PROCEDURE — G0101 CA SCREEN;PELVIC/BREAST EXAM: HCPCS | Mod: S$GLB,,, | Performed by: OBSTETRICS & GYNECOLOGY

## 2024-02-21 PROCEDURE — 1126F AMNT PAIN NOTED NONE PRSNT: CPT | Mod: CPTII,S$GLB,, | Performed by: OBSTETRICS & GYNECOLOGY

## 2024-02-21 PROCEDURE — 1159F MED LIST DOCD IN RCRD: CPT | Mod: CPTII,S$GLB,, | Performed by: OBSTETRICS & GYNECOLOGY

## 2024-02-21 NOTE — PROGRESS NOTES
Chief Complaint   Patient presents with    Annual Exam     Rx refill ESTRACE, pt would like 90 day supply; cheaper for her/insurance.       History and Physical:  No LMP recorded. Patient has had a hysterectomy.       Tayler Dominguez is a 78 y.o. WF who presents today for her routine annual GYN exam. The patient has no Gynecology complaints today. Just got out of hospital      Allergies:   Review of patient's allergies indicates:   Allergen Reactions    Ambien [zolpidem]     Cephalosporins     Cleocin [clindamycin hcl]     Codeine     Erythropoietin analogues     Influenza vaccine tri-sp 09-10     Levaquin [levofloxacin]     Macrobid [nitrofurantoin monohyd/m-cryst]     Morphine     Pcn [penicillins]     Sudal [phenylephrine-pot guaiacolsulf]     Sulfa (sulfonamide antibiotics)        Past Medical History:   Diagnosis Date    Allergy     Arthritis     hands    Breast disorder     Crescendo angina     Edema     Heart murmur     MVP    History of 2019 novel coronavirus disease (COVID-19) 01/26/2021    Hypertension     Kidney stones     Menopause     Migraine headache     Mitral regurgitation     Near syncope     Sinusitis     SOB (shortness of breath)     Supraventricular tachycardia        Past Surgical History:   Procedure Laterality Date    ABDOMINAL SURGERY      APPENDECTOMY      BREAST BIOPSY      CARDIAC CATHETERIZATION  2013    CATARACT EXTRACTION W/  INTRAOCULAR LENS IMPLANT Bilateral     COLONOSCOPY  11/15/2013    Done by Dr. ELTON Lawrence -- report in paper chart    CORONARY ANGIOGRAPHY N/A 08/18/2020    Procedure: ANGIOGRAM, CORONARY ARTERY;  Surgeon: Anjelica James MD;  Location: [facility] CATH;  Service: Cardiology;  Laterality: N/A;    HYSTERECTOMY      PARTIAL @ 27 YO, COMPLETE @ 30 YO    LEFT HEART CATHETERIZATION N/A 08/18/2020    Procedure: Left heart cath;  Surgeon: Anjelica James MD;  Location: [facility] CATH;  Service: Cardiology;  Laterality: N/A;    RADIOFREQUENCY ABLATION  2013    at [facility] heart        MEDS:  No new care gaps identified.  Powered by [employer]. Reference number: [id number]. 11/17/2020 10:35:57 AM   CST     Current Outpatient Medications on File Prior to Visit   Medication Sig Dispense Refill    albuterol (PROVENTIL) 2.5 mg /3 mL (0.083 %) nebulizer solution Take 3 mLs (2.5 mg total) by nebulization every 6 (six) hours as needed for Wheezing. Rescue 30 mL 0    apixaban (ELIQUIS DVT-PE TREAT 30D START) 5 mg (74 tabs) DsPk For the first 7 days take two 5 mg tablets twice daily.  After 7 days take one 5 mg tablet twice daily. 100 tablet 1    ascorbic acid, vitamin C, (VITAMIN C) 500 MG tablet Take 500 mg by mouth daily as needed (during winter months).      butalbital-acetaminophen-caffeine -40 mg (FIORICET, ESGIC) -40 mg per tablet Take 1 tablet by mouth every 6 (six) hours as needed for Headaches.      cholecalciferol, vitamin D3, 50 mcg (2,000 unit) TbDL Take 2,000 Units by mouth every evening.      furosemide (LASIX) 20 MG tablet Take 1 tablet (20 mg total) by mouth every other day. 45 tablet 1    ipratropium (ATROVENT) 21 mcg (0.03 %) nasal spray 2 sprays by Each Nostril route 3 (three) times daily. 30 mL 2    ketoconazole (NIZORAL) 2 % shampoo Apply 1 application  topically 3 (three) times a week.  6    levocetirizine (XYZAL) 5 MG tablet TAKE 1 TABLET (5 MG TOTAL) BY MOUTH EVERY EVENING. 90 tablet 3    meloxicam (MOBIC) 7.5 MG tablet Take 1 tablet (7.5 mg total) by mouth once daily. 20 tablet 1    metoprolol succinate (TOPROL-XL) 25 MG 24 hr tablet TAKE 1 TABLET (25 MG TOTAL) BY MOUTH ONCE DAILY. (Patient taking differently: Take 25 mg by mouth every evening.) 90 tablet 1    nitroGLYCERIN (NITROSTAT) 0.4 MG SL tablet Place 1 tablet (0.4 mg total) under the tongue every 5 (five) minutes as needed. 25 tablet 0    olmesartan (BENICAR) 20 MG tablet Take 1 tablet (20 mg total) by mouth once daily. (Patient taking differently: Take 20 mg by mouth every evening.) 90 tablet 0    ondansetron (ZOFRAN) 4 MG tablet Take 1 tablet (4 mg total) by mouth every 8 (eight) hours as needed for Nausea. 25 tablet 2     petrolat,wht/min oil/sod chl (DRY EYES OPHT) Place 1 drop into both eyes daily as needed (dry eyes).      [DISCONTINUED] estradioL (ESTRACE) 1 MG tablet Take 1 tablet (1 mg total) by mouth once daily. (Patient taking differently: Take 1 mg by mouth every evening.) 30 tablet 0     No current facility-administered medications on file prior to visit.       OB History    No obstetric history on file.         Social History     Socioeconomic History    Marital status:    Tobacco Use    Smoking status: Never     Passive exposure: Never    Smokeless tobacco: Never   Substance and Sexual Activity    Alcohol use: No    Drug use: No     Social Determinants of Health     Financial Resource Strain: Low Risk  (1/29/2024)    Overall Financial Resource Strain (CARDIA)     Difficulty of Paying Living Expenses: Not hard at all   Food Insecurity: No Food Insecurity (1/30/2024)    Hunger Vital Sign     Worried About Running Out of Food in the Last Year: Never true     Ran Out of Food in the Last Year: Never true   Transportation Needs: No Transportation Needs (1/30/2024)    PRAPARE - Transportation     Lack of Transportation (Medical): No     Lack of Transportation (Non-Medical): No   Physical Activity: Inactive (1/29/2024)    Exercise Vital Sign     Days of Exercise per Week: 0 days     Minutes of Exercise per Session: 0 min   Stress: No Stress Concern Present (1/29/2024)    [employer] [place] [employer] - Occupational Stress Questionnaire     Feeling of Stress : Only a little   Social Connections: Moderately Isolated (1/29/2024)    Social Connection and Isolation Panel [NHANES]     Frequency of Communication with Friends and Family: Twice a week     Frequency of Social Gatherings with Friends and Family: Never     Attends [employer] Services: More than 4 times per year     Active Member of Clubs or Organizations: No     Attends Club or Organization Meetings: Never     Marital Status:    Housing Stability: Low  Risk  (1/30/2024)    Housing Stability Vital Sign     Unable to Pay for Housing in the Last Year: No     Number of Places Lived in the Last Year: 1     Unstable Housing in the Last Year: No       Family History   Problem Relation Age of Onset    Cancer Mother     Aneurysm Mother     Hypertension Mother     Heart disease Father     Hyperlipidemia Father     Hypertension Father     Diabetes Father     Heart disease Sister     Heart disease Sister     Heart disease Brother     Kidney disease Brother     Heart disease Brother     Cancer Brother          Past medical and surgical history reviewed.   I have reviewed the patient's medical history in detail and updated the computerized patient record.        Review of System:   General: no chills, fever, night sweats, weight gain or weight loss  Psychological: no depression or suicidal ideation  Breasts: no new or changing breast lumps, nipple discharge or masses.  Respiratory: no cough, shortness of breath, or wheezing  Cardiovascular: no chest pain or dyspnea on exertion  Gastrointestinal: no abdominal pain, change in bowel habits, or black or bloody stools  Genito-Urinary: no incontinence, urinary frequency/urgency or vulvar/vaginal symptoms, pelvic pain or abnormal vaginal bleeding.  Musculoskeletal: no gait disturbance or muscular weakness      Physical Exam:   BP (!) 158/100   Wt 75.4 kg (166 lb 3.6 oz)   BMI 26.03 kg/m²   Constitutional: She is oriented to person, place, and time. She appears well-developed and well-nourished. No distress.  HENT:   Head: Normocephalic and atraumatic.   Eyes: Conjunctivae and EOM are normal. No scleral icterus.   Neck: Normal range of motion. Neck supple. No tracheal deviation present.   Cardiovascular: Normal rate.    Pulmonary/Chest: Effort normal. No respiratory distress. She exhibits no tenderness.  Breasts: are symmetrical.no masses    Right breast exhibits no inverted nipple, no mass, no nipple discharge, no skin change and no  tenderness.   Left breast exhibits no inverted nipple, no mass, no nipple discharge, no skin change and no tenderness.  Abdominal: Soft. She exhibits no distension and no mass. There is no tenderness. There is no rebound and no guarding.   Genitourinary:    External rectal exam shows no thrombosed external hemorrhoids.    Pelvic exam was performed with patient supine.   No labial fusion.   There is no rash, lesion or injury on the right labia.   There is no rash, lesion or injury on the left labia.   No bleeding and no signs of injury around the vaginal introitus, urethra is without lesions and well supported.    No vaginal discharge found.    No significant Cystocele, Enterocele or rectocele, and cuff well supported.   Bimanual exam:   The urethra and vagina are without palpable masses or tenderness.   Uterus and cervix are surgically absents, vaginal cuff is intact and well supported.   Right adnexum displays no mass and no tenderness.   Left adnexum displays no mass and no tenderness.  Musculoskeletal: Normal range of motion.   Lymphadenopathy: No inguinal adenopathy present.   Neurological: She is alert and oriented to person, place, and time. Coordination normal.   Skin: Skin is warm and dry. She is not diaphoretic.   Psychiatric: She has a normal mood and affect.        Assessment:   Normal annual GYN exam  1. Encounter for annual routine gynecological examination        2. Screening mammogram for breast cancer  Mammo Digital Screening Bilat    Mammo Digital Screening Bilat    CANCELED: Mammo Digital Screening Bilat        stopHRT- pt on blood thinners    Plan:   PAP  Not Needed  Mammogram [place]  Follow up in 1 year.

## 2024-03-07 PROBLEM — I77.9 DISORDER OF ARTERIES AND ARTERIOLES, UNSPECIFIED: Status: ACTIVE | Noted: 2024-03-07

## 2024-03-13 RX ORDER — OLMESARTAN MEDOXOMIL 20 MG/1
20 TABLET ORAL NIGHTLY
Qty: 90 TABLET | Refills: 1 | Status: SHIPPED | OUTPATIENT
Start: 2024-03-13 | End: 2024-04-19 | Stop reason: DRUGHIGH

## 2024-03-14 ENCOUNTER — TELEPHONE (OUTPATIENT)
Dept: CARDIOLOGY | Facility: CLINIC | Age: 79
End: 2024-03-14
Payer: MEDICARE

## 2024-03-14 NOTE — TELEPHONE ENCOUNTER
----- Message from Anjelica James MD sent at 3/14/2024  2:51 PM CDT -----  No Cardiac contraindication  Whoever sending the measure said that the it was sent multiple times this is the 1st time I hear about it  ----- Message -----  From: Sadi Barry LPN  Sent: 3/14/2024   2:46 PM CDT  To: Anjelica James MD      ----- Message -----  From: Scout Devries  Sent: 3/14/2024   2:40 PM CDT  To: Erika PADILLA Staff    Type: Needs Medical Advice  Who Called:  pt  Best Call Back Number: [phone]  Additional Information: pt is calling the office to speak with the nurse urgently about whether she can take gemtesa 75mg or not. The pt messaged yesterday as well as on the 8th but has not heard back from anyone as of yet. Please call back ASAP today to advise. Thanks!

## 2024-03-28 NOTE — TELEPHONE ENCOUNTER
----- Message from Lucia Valadez sent at 3/28/2024  3:14 PM CDT -----  Type:  RX Refill Request    Who Called:  patient  Refill or New Rx:  refill  RX Name and Strength:  eliquis 5mg   How is the patient currently taking it? (ex. 1XDay):  as directed  Is this a 30 day or 90 day RX:  90  Preferred Pharmacy with phone number:    [address] [address]  Phone: [phone] Fax: [phone]  Local or Mail Order:  local  Ordering Provider:  larisa  [facility] Call Back Number:  [phone] (home)   Additional Information:  she would appreciate free samples. She wants to know if she can  some more samples on Monday, Please advise

## 2024-04-22 ENCOUNTER — HOSPITAL ENCOUNTER (OUTPATIENT)
Dept: CARDIOLOGY | Facility: HOSPITAL | Age: 79
Discharge: HOME | End: 2024-04-22
Attending: INTERNAL MEDICINE
Payer: MEDICARE

## 2024-04-22 DIAGNOSIS — I48.0 PAF (PAROXYSMAL ATRIAL FIBRILLATION): ICD-10-CM

## 2024-04-22 PROCEDURE — 93244 EXT ECG>48HR<7D REV&INTERPJ: CPT | Mod: ,,, | Performed by: INTERNAL MEDICINE

## 2024-04-22 PROCEDURE — 93242 EXT ECG>48HR<7D RECORDING: CPT | Mod: PO

## 2024-04-28 ENCOUNTER — TELEPHONE (OUTPATIENT)
Dept: ELECTROPHYSIOLOGY | Facility: CLINIC | Age: 79
End: 2024-04-28
Payer: MEDICARE

## 2024-04-28 NOTE — TELEPHONE ENCOUNTER
Patient returned my call. Patient states she is not feeling well and would like to reschedule in [place] soon. I told patient I would call her back as soon as I know a day that we can reschedule. Patient verbalized understanding.

## 2024-04-28 NOTE — TELEPHONE ENCOUNTER
Called patient to reschedule appointment with Dr. Gonsalez from 4/29/24.Patient did not answer. I left a message with my call back number.    [facility]   Psychiatric Progress Note    ID: Tamra (pronounced They-uh) is a 18yo adopted female with type 2 DM, as well as extensive mental health history including multiple psychiatric hospitalizations, stays in day treatment and RTC, as well as history of multiple suicide attempts.  She was recently hospitalized on inpatient psychiatric unit after overdose/ingestion of her medications and insulin.  Patient presents 2/4 for entry into Partial Hospitalization Program.         INTERIM HISTORY:  The patient's care was discussed with the treatment team and chart notes were reviewed.  I have reviewed and updated the patient's Past Medical History, Social History, Family History and Medication List.    Spoke with Tamra this morning, noting to be participating in art therapy, agreeable to meeting.    She noted she went to mall with friends this weekend, then over to a friend's house, but also notes she won't be leaving house anymore, by her choice.  Asked what she meant, and she didn't give any more details.  Later spoke with therapist, and learned more what she had learned from family about incident of patient and sister turning off the tracking on their phone, not going where they said they were going to go, and ending up being out late, family not knowing where they were, etc.  Sounded very difficult for family overall, and patient ended up being brought to ED that night.  See EMR for more details of this. The events of Fri night/Sat morning included Tamra admitting to using chemicals, with then drug screen obtained at program today (noted to be negative).     Spoke with Tamra earlier about any safety concerns, where she would shrug shoulders and not verbally commit to being safe, with no plans though voiced to harm self or others.  Shrugs when asked if she engaged in self-harm this weekend, wouldn't say one way or the other.  Did learn from therapist they were able to talk about safety, that therapist did confirm  later in morning that Tamra felt safe going home, and Tamra was able to talk more about what was going on this weekend.  Encouraged by trust Tamra has developed with therapist here, and how she continues to be able to be more open and honest.      Called family, spoke with Dad more about his recent impressions, including thoughts on recent events.  Processed through more of the recent events, spoke with him about his thoughts on recent events, and what he would envision going forward.  Can hear how there can be some signs of fatigue in Dad, and how they can feel helpless during these times.  Spoke about positives we have still seen with her here, and rapport she has with Autumn.  Encouraged him to continue taking care of himself and Michelle during these difficult times, and we will continue to talk about what level of care seems to be most appropriate and/or available given circumstances.  Spoke about how there is not acute criteria for inpatient care, but still they have option to have Tamra brought to ED if acute safety concerns arise, and spoke too about still consideration in future for RTC if LTDT was not sustainable. Dad understands.     PHYSICAL ROS:  Gen: tired, baseline  HEENT: negative  CV: negative  Resp: negative  GI: negative  : negative  MSK: negative  Skin: negative  Endo: negative  Neuro: negative    CURRENT MEDICATIONS:  1. Cymbalta 60mg daily (increased from 30mg daily on 2/19)  2. Depakote ER 1,000mg at bedtime (lowered from 1,500mg daily the week of 2/7)  3. Cariprazine (Vraylar) 6mg daily  4. Cogentin 1mg BID  5. Hydroxyzine 25mg TID PRN anxiety  6. Insulin Lantus 45 units subcu daily (when off insulin pump)  7. Insulin lispro 1 unit per 7 grams of carb coverage, 1 unit per 20 mg/dL over 150.  8. Victoza inj 3mg subcu daily  9. Jardiance 10mg daily  10. Melatonin 5mg at bedtime PRN insomnia  11. Pepcid 20mg BID       Side effects: possible sedation, though improved, reports feeling  "numb    ALLERGIES:  Allergies   Allergen Reactions     Acetylcysteine Other (See Comments)     Angioedema. Swollen uvula/throat     Amoxicillin Itching and Rash       MENTAL STATUS EXAMINATION:  Appearance:  tired (at baseline), casually dressed, neatly groomed  Attitude: slightly guarded  Eye Contact: fair  Mood:  \"fine\"  Affect: neutral  Speech:  clear, coherent, but limited spontaneous speech  Psychomotor Behavior:  no evidence of tardive dyskinesia, dystonia, or tics  Thought Process:  linear, concrete  Associations:  no loose associations  Thought Content:  no evidence of current suicidal ideation or homicidal ideation and no evidence of psychotic thought.  Some passive thoughts of hurting herself noted in past.  SIB urges on 3/9, denies today, though shrugging shoulders to safety questions during this interview.   Insight:  fair-poor  Judgment:  fair-limited  Oriented to:  Time, person, place  Attention Span and Concentration:  intact  Recent and Remote Memory:  intact  Language: intact  Fund of Knowledge: appropriate  Gait and Station: within normal limits     VITALS:  1/21:  /50   Pulse 115   Temp 97.3  F (36.3  C) (Temporal)   Resp 16   Ht 1.626 m (5' 4\")   Wt 129.7 kg (286 lb)   SpO2 97%   BMI 49.09 kg/m    Weight is 286 lbs 0 oz  Body mass index is 49.09 kg/m .  2/11: XE=808/81, P=96, T=98.9, BMI=48.75  3/11: DP=705/59, P=97, T=98.9  Wt Readings from Last 5 Encounters:   03/31/22 130.7 kg (288 lb 3.2 oz) (>99 %, Z= 2.88)*   03/26/22 128.9 kg (284 lb 2.8 oz) (>99 %, Z= 2.86)*   03/23/22 128.8 kg (284 lb) (>99 %, Z= 2.86)*   03/11/22 129.2 kg (284 lb 13.4 oz) (>99 %, Z= 2.87)*   03/09/22 130.3 kg (287 lb 3.2 oz) (>99 %, Z= 2.89)*     * Growth percentiles are based on CDC (Girls, 2-20 Years) data.      LABS:  During inpt stay:  CBC wnl  COMP wnl except for 1/15-Albumin 3.1, Calcium 8.5, ammonia 10  Potassium   1/14-6.0  1/4.2  Samantha D  UDS-neg  Valpo:   1/14- " 117  1/15-98  1/25-92     Acetaminophen level 2     SARS CoV3 PCR: 1/14-neg, 1/25-neg    2/9 -   3/2 - VPA 69  3/11- Hgb A1c 7.8    History:  Mom notes history of brain MRI in 2020 that was wnl     PSYCHOLOGICAL TESTING: See EMR, testing done in 7520-3012.     2/24/22  Anders Bhatia PsyD, LP:  TEST RESULTS:  COGNITIVE FUNCTIONING:  Tamra presented with low average to borderline intellectual ability.  She did not have significant difficulty thinking abstractly.  She had periods of inattentiveness which appeared to be due to her level of arousal, but did not appear hyperactive or impulsive.  She was well-mannered and engaged.  She struggled to multitask during the family drawing.  Tamra was administered the WISC-V to assess her cognitive functioning.  She did have a tendency to give up easily if she did not know an answer right away, but would answer with some encouragement.  Her scores may be a somewhat underestimate of her true functioning and abilities due to this response.  The average subtest in the general population is 10 and the range is from 1-19.  Her subtests are as follows:  1.  Block design 5.  2.  Similarities 8.  3.  Matrix reasoning 9.  4.  Digit span 7.  5.  Coding 6.  6.  Vocabulary 7.  7.  Figure weights 5.  8.  Visual puzzle 9.  9.  Picture span 6.  10.  Thimble search 8.     Average composite scores range from .  Her composite scores are as follows:   1.  Verbal comprehension index (VCI):  Composite score 86, 18th percentile, low average (95% confidence interval 79-95).  2.  Visual spatial index (VSI):  Composite score 84, 14th percentile, low average (95% confidence interval 78-93).  3.  Fluid reasoning index (FRI):  Composite score 82, 12th percentile, low average range (95% confidence interval 76-90).  4.  Working memory index (WMI):  Composite score 79, 8th percentile, very low range (95% confidence interval 73-88).   5.  Processing speed index (PSI):  Composite score 83, 13th  percentile, low average (using a 95% confidence interval, her true score lies between 103-121).  6.  Full scale IQ (FSIQ):  Composite score 76, 5th percentile, very low (95% confidence interval 71-83).     As shown above, Tamra's performance on the WISC range from the low average to very low range.  While an FSIQ was reported above, it is likely not the most representative score of her abilities due to the wide spread of variation between her subtest scores.  As such, her performance is best looked at at the index level rather than at the global full scale IQ level.  No significant strengths and weaknesses were noted of her index performances.  However, it should be noted that all but her verbal comprehension performances were in the normative weakness range when compared to her peers.  Tamra likely has to put forth significantly more effort than her peers to learn, comprehend and engage in cognitive tasks.  Her scores are significantly different than previous testing, suggesting a cognitive decline and impairments in functioning that may be due to multiple different sources.  Overall, Tamra will need significant support and scaffolding to learn in school settings and in therapeutic settings.  Given Tamra's challenges with academics, she was administered the WRAT-5.  This is an assessment of reading, spelling and math skills.  Average scores range from .  Her scores are as follows:  1.  Word reading subtest:  91, 27th percentile, average, with a grade equivalent of 7.6 (95% confidence interval 84-98).  2.  Spelling subtest:  92, 30th percentile, average, with a grade equivalent of 4.6 (95% confidence interval ).  3.  Math computation:  77, 6th percentile, very low range, with a grade equivalent of 4.0 (95% confidence interval 69-85).  4.  Sentence comprehension subtest:  86, 18th percentile, low average, with a grade equivalent of 3.1 (95% confidence interval 79-93).  5.  Reading composite:  87, 19th  percentile, low average, (95% confidence interval 81-93).     As seen above, Tamra's academic skills range from the average to very low range.  She displayed with particular weakness in math computation in the very low range.  All other scores were within the normative limits; however, many of her abilities were significantly under grade level than would be expected, ranging from 3rd grade to the 7th grade level.  Overall, Tamra likely will struggle significantly with academic tasks that are at a 9th grade level and will need significant support and modification to enhance her learning.     Tamra was right handed on the Kline design task.  She learned the instructions quickly.  She took the average time to complete the task.  The Koppitz-2 score system used for the Kline design task suggested her performance was in the low average range.  Her visual motor index was 81, which is in the 10th percentile, with an age equivalent of 10 years, 1 month.  She was able to recall 2 Kline figures, suggesting challenges with visual motor memory.  Overall, there is some concern of gross neuropsychological dysfunction at this time.      Autism Diagnostic Observation Schedule 2nd edition:  This assessment will be completed at a later date and supplemental report will be provided.  There were no signs of a thought disorder seen during this evaluation.     PERSONALITY FUNCTIONING:  Tamra presented as a cooperative but withdrawn and shy adolescent.  She engaged in tasks, but was minimally interactive with this evaluator.  She has significant past psychiatric history, including depression, anxiety, autism spectrum, disruptive mood dysregulation.  The medical record notes a significant history of mental health interventions, including outpatient, multiple inpatient hospitalizations, day treatment and residential treatment.  Tamra's projective drawing suggested symptoms of anxiety, depression, emotional lability and limited coping skills at  "this time.  Her family drawing included herself, her father, her mother and her twin sister, Cyn.  She had everyone smiling with outstretched arms, indicating no significant familial concerns.  Tamra shared that her father works at [employer] and that their relationship has been \"getting better,\" but it was stressful in the past.  Her mother is a stay-at-home mom.  Tamra shared that their relationship has been difficult.  Tamra shared that her relationship with her twin sister \"used to be better.\"  When asked about stressors in the family, she indicated that there has been many, but did not elaborate.  Records indicate that Tamra's sister is currently in a residential treatment center and that Tamra's recent suicide attempt has caused some riffs in relationships.  Tamra completed the CDI-2 to evaluate the nature and severity of depression symptoms currently.  Scores are represented as T scores and those of 65 and up are considered clinically relevant.  Her scores are as follows:  1.  Total score:  T equals 88, very elevated.  2.  Emotional problems:  T equals 90 +, very elevated.  3.  Negative mood/physical symptoms:  T equals 89, very elevated.  4.  Negative self-esteem:  T equals 84, very elevated.  5.  Functional problems:  T equals 77, very elevated.  6.  Ineffectiveness:  T equals 78, very elevated.  7.  Interpersonal problems:  T equals 66, elevated.     As shown above, Tamra rated significant symptoms of depression in all domains; however, with interpersonal problems to a lesser extent.  Symptoms of note include, \"I am sad all the time.  I hate myself.  I feel like crying every day.  I do very badly in subjects I used to be good in.  I look ugly.  I have to push myself all the time to do school work.  I'm tired all the time.  Most days, I do not feel like eating.  Most days, I feel like I can't stop eating.  I fall asleep during the day all the time.\"  Tamra was given the RCMAS-2 to evaluate the nature and level of " "anxiety symptoms for her currently.  Her scores are considered valid, as she did not respond in an overly defensive manner.  Scores of 60 and above are considered clinically elevated.  Her scores are as follows:  1.  Total score:  T equals 71, clinically elevated.  2.  Physiological anxiety:  T equals 56, not clinical.  3.  Worry:  T equals 74, clinical.  4.  Social anxiety:  T equals 73, clinical.     As can be seen from above, Tamra rated significantly elevated anxiety symptoms, particularly in the worry and social concerns domain.  Symptoms of note include, \"I often feel sick in my stomach.  I'm nervous.  I worry about something bad happening to me.  I worry that others do not like me.  I get nervous around people.  I get mad easily.  I worry about what my parents will say to me.  I feel alone when there are people with me.  I get teased at school.  My feelings are hurt easily.\"  Tamra attempted to complete the MMPIA; however, she had significant difficulty with this and attempted to complete this over the course of 3 days, but was able to fill out very few items.  Due to this, this measure was discontinued, as it will likely be rendered invalid.  She noted having significant difficulty focusing and comprehending the questions.  During the direct interview, Tamra reported that her earliest memory was going camping with her family around the age of 8 or 9.  She described her childhood as \"overwhelming and chaotic.\"  When asked who is the person she is the closest to, she shared \"there is no person I have like that.\"  When asked what wishes she has, she had difficulty answering this.  With some support, she shared that she wishes to be happy.  She reported having fears of roller coasters.  Tamra indicated that she enjoys spending her time being with friends, shopping and being on her phone.  She likes all types of music.  When asked about her current challenges in life, she shared that these are feeling guilty all the " "time.  She indicated having hope that this will be resolved in 5 years.  In the future, she would like to graduate from high school and go to college to study food.  Tamra reported she is in good physical health.  She indicated that she has diabetes and has ALLERGIES TO AMOXICILLIN AND ACETYLCYSTEINE.  She denied having any seizures, head injuries or loss of consciousness.  She denied any concerns with sleep.  She reported that eating is a \"struggle\" and went on to explain that there are times where she will not feel full, but other times where she will have no appetite.  Tamra denied any experiences of physical or sexual abuse, but did indicate experiencing emotional abuse; however, she declined to discuss this further.  She denied any symptoms of psychosis; however, the record indicates a longstanding feeling of like there is an entity influencing her behavior and having the auditory and visual hallucinations.  At the time of her admission, she noted occasional auditory hallucinations, hearing whispering that she cannot make out.  Tamra endorsed experiencing manic like episodes and shared that there are times where she will be \"really happy, almost hysterical about it.\"  She shared that these have lasted several days.  During those times, she sleeps less, her speech is more pressured, she is more interesting in things.  Tamra reported that she cannot remember a time when she was not depressed.  She reported that her depression symptoms tend to be \"a lot of guilty feelings.\"  She indicated having approximately 4-5 suicide attempts.  She is unsure if she will attempt in the future, she sees it as likely, but \"I don't like to think about it.\"  She reported that her friends and family are protective factors.  Tamra reported engaging in self-harm in the form of cutting and purging.  Regarding anxiety, Tamra shared that \"everything\" will make her anxious.  Once she starts becoming anxious, it is difficult to control.  She " "endorsed feeling irritable, tired, feeling her mind blank out, feeling restless and having some headaches and stomachaches.  Regarding symptoms of obsessive compulsive disorder, Tamra shared that if she trips on one leg, she needs to trip on the other one or else her body will feel uneven.  She always has to have her father come into her room after 5-7 minutes.  Tamra denied any challenges with substance use.  Regarding symptoms of autism, she shared that she has significant challenges with sounds and it is hard for her to focus or concentrate.  She also does not like clothing textures that are \"too thin.\"  She shared that she has challenges in making and keeping friends, difficulties understanding what other people are thinking or feeling.  She denied having any significant fixed interests.  She reported challenges with change and with flexibility and reported that \"If I don't get my way, I get really mad.\"  Tamra denied having a current therapist.  She reported that therapy in the past has been helpful for her; however, she denied having any other concerns or challenges that still bother her.  She rated her mood as a 5 or 6 on a scale of 0-10, 0 being the best, 10 being the worst.     Tamra Jaimes is a 17-year-old adolescent who was seen for this evaluation for diagnostic clarification including ASD and cognitive functioning.  She has a past psychiatric history of depression, anxiety, schizoaffective disorder, disruptive mood dysregulation disorder, autism.  Her diagnosis is major depression.  She has an extensive mental health history, including multiple inpatient hospitalizations, day treatment and residential treatment.  During testing, she was cooperative, but somewhat subdued, anxious and withdrawn.  She was noted to have periods where she appeared to be distracted or possibly nodding off.  Additionally, during testing, if she could not immediately come up with the answer, she did have some inclination to give " up; however, she would continue with some encouragement.  Given these challenges, her testing should be interpreted with some caution, but is likely an accurate representation of her current functioning given her presentation and symptoms.  Tamra's cognitive functioning was overall in the very low range; however, given the spread between her subtest performance, her performance is best looked at at the index level.  She performed in the low average range in all indices aside from working memory, which was in the very low range.  No significant patterns of strengths or weaknesses were noted.  Tamra's past testing in 2019 and 2020 put her overall cognitive functioning in the low average to average range.  It does appear at this time there has been a significant drop in Tamra's cognitive functioning.  Additionally, when completing the Kline, her performance did elevate to the level of some concern with gross neuropsychological deficit.  Her behavior during the testing was notable with possible lapses in consciousness.  When asked about this, Tamra denied any challenges with sleep; however, when consulting with the team, there are concerns that she may have sleep apnea and this could be impeding her level of restfulness, focus and ability to comprehend.  Given Tamra's current performance, she likely will have significant struggles learning academically, comprehending and achieving academically and learning interventions in treatment.  She will likely need significant support.  Tamra's performance on the WRAT-5 suggested her academic skills range from the average to very low range.  More simple tasks with language including word reading and spelling were in the average range, with more complex ones, sentence comprehension and reading composite being in the low average range.  Of note, she struggled the most with her math computation, which was in the very low range.  Tamra's performance was significantly below grade level on  all tasks, suggesting that she will struggle significantly with grade level tasks without modifications.  Tamra had significant difficulty filling out the long form symptom inventory and as such, these were discontinued.  On the short form ones, her responses were notable for significant levels of depression and anxiety.  In discussing this further with her, Tamra noted significant low mood symptoms.  However, she reported having periods of times when she has more energy, sleeps less, is more impulsive, engages in risky behaviors.  This writer has some concern that Tamra's mood episodes may be more related to an underlying bipolar disorder rather than unipolar depression; however, more information is needed at this time and this will be a rule out.  Tamra reported significant levels of anxiety.  In discussing this, she shared that she worries about many different things and her symptoms fit a clinical picture of generalized anxiety disorder; as such, this will be updated.  Tamra's records indicate that she was tested for autism in the past and was elevated on the ADOS.  School records indicate some challenges in line with autism, as do family report of sensory sensitivities, challenges with change and flexibility.  During this testing process, Tamra was noted to have significant challenges with eye contact, had difficulty engaging in the back and forth of social interactions and have limited expressive or emphatic gestures.  A supplemental ADOS will be completed at a later date, and as such a rule out for this diagnosis will be added.      TREATMENT PLAN SUGGESTIONS:  1.  Given Tamra's behaviors during testing and test status suggesting possible neuropsychological dysfunction, she would benefit from consultation with a sleep specialist and with Neurology to determine if there are any underlying organic or medical conditions which are impairing her current cognitive functioning.  2.  Given Tamra's current cognitive  functioning, she will likely need significant support academically and in treatment, this includes scaffolding, repetition, ability to write information down, and more one-to-one coaching.  She also may need information that is below current grade levels to be successful.  3.  Tamra should continue medication management for her mood and anxiety symptoms.  4.  Monitoring should be done of mood symptoms, as it appears that Tamra may meet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

## 2024-04-28 NOTE — TELEPHONE ENCOUNTER
Called patient again to reschedule appointment with Dr. Gonsalez from 4/29/24.Patient did not answer. I left a message with my call back number.

## 2024-04-29 ENCOUNTER — TELEPHONE (OUTPATIENT)
Dept: ELECTROPHYSIOLOGY | Facility: CLINIC | Age: 79
End: 2024-04-29
Payer: MEDICARE

## 2024-04-29 LAB
OHS CV EVENT MONITOR DAY: 2
OHS CV HOLTER LENGTH DECIMAL HOURS: 71
OHS CV HOLTER LENGTH HOURS: 23
OHS CV HOLTER LENGTH MINUTES: 0
OHS CV HOLTER SINUS AVERAGE HR: 94
OHS CV HOLTER SINUS MAX HR: 160
OHS CV HOLTER SINUS MIN HR: 44

## 2024-04-29 NOTE — TELEPHONE ENCOUNTER
----- Message from Lucia Deng MA sent at 4/29/2024 12:01 PM CDT -----    ----- Message -----  From: Kelley Lora  Sent: 4/29/2024  11:47 AM CDT  To: [facility] Staff    Type:  Patient Returning Call    Who Called:  pt  Who Left Message for Patient:  unknown  Does the patient know what this is regarding?:  yes  Best Call Back Number:  [phone]    Additional Information:  please call and advise--thank you

## 2024-04-29 NOTE — TELEPHONE ENCOUNTER
Called patient in response to her message. I told the patient that I would not have a date to rescheduled in [place] until the end of the week. I offered 5/22 at [facility] and patient states she wants to go to [place]. I told the patient I would call her back at the end of the week. She verbalized understanding.

## 2024-05-01 ENCOUNTER — TELEPHONE (OUTPATIENT)
Dept: ELECTROPHYSIOLOGY | Facility: CLINIC | Age: 79
End: 2024-05-01
Payer: MEDICARE

## 2024-05-01 NOTE — TELEPHONE ENCOUNTER
Called patient and rescheduled her appointment with Dr. Gonsalez for 5/10/24. Patient agrees with appointment.

## 2024-05-06 PROBLEM — I26.99 MULTIPLE PULMONARY EMBOLI: Status: RESOLVED | Noted: 2024-01-29 | Resolved: 2024-05-06

## 2024-05-09 NOTE — PROGRESS NOTES
PCP - Jhonny Arenas MD  Subjective:     I had the pleasure today of seeing Tayler Dominguez (78 y.o. female) at the request of Dr. James for atrial flutter.    History:  CTI dependent AFL  AF  Pulmonary embolism 1/2024  HFpEF    Background:  1/31/24 admitted with acute bilateral PE > started on treatment dose Eliquis. Noted have newly diagnosed CTI dependent atrial flutter with periods reported of atrial fibrillation.    72 Hr Holter: 100% AFL with periods questionable for course AF. Mean HR 94 (min 44; max 160).   TTE 1/2024: LVEF 55-60%. Normal RV. PASP 43 with CVP 15.     Takes Eliquis 5 mg BID and Toprol XL 25 mg daily    In clinic today:  Reports palpitations with AFL  Tolerating OAC  Reports ablation 20 years prior at [facility] for possible SVT?    I personally reviewed the ECG/telemetry strip today which shows AFL    History:     Social History     Tobacco Use    Smoking status: Never     Passive exposure: Never    Smokeless tobacco: Never   Substance Use Topics    Alcohol use: No     Family History   Problem Relation Name Age of Onset    Cancer Mother      Aneurysm Mother      Hypertension Mother      Heart disease Father      Hyperlipidemia Father      Hypertension Father      Diabetes Father      Heart disease Sister      Heart disease Sister      Heart disease Brother      Kidney disease Brother      Heart disease Brother      Cancer Brother         Meds:     Review of patient's allergies indicates:   Allergen Reactions    Ambien [zolpidem]     Cephalosporins     Cleocin [clindamycin hcl]     Codeine     Erythropoietin analogues     Influenza vaccine tri-sp 09-10     Levaquin [levofloxacin]     Macrobid [nitrofurantoin monohyd/m-cryst]     Morphine     Pcn [penicillins]     Sudal [phenylephrine-pot guaiacolsulf]     Sulfa (sulfonamide antibiotics)        Current Outpatient Medications:     albuterol (PROVENTIL) 2.5 mg /3 mL (0.083 %) nebulizer solution, Take 3 mLs (2.5 mg total) by nebulization every 6  (six) hours as needed for Wheezing. Rescue, Disp: 30 mL, Rfl: 0    apixaban (ELIQUIS) 5 mg Tab, Take 1 tablet (5 mg total) by mouth 2 (two) times daily., Disp: 180 tablet, Rfl: 1    ascorbic acid, vitamin C, (VITAMIN C) 500 MG tablet, Take 500 mg by mouth daily as needed (during winter months)., Disp: , Rfl:     butalbital-acetaminophen-caffeine -40 mg (FIORICET, ESGIC) -40 mg per tablet, Take 1 tablet by mouth every 6 (six) hours as needed for Headaches., Disp: , Rfl:     cholecalciferol, vitamin D3, 50 mcg (2,000 unit) TbDL, Take 2,000 Units by mouth every evening., Disp: , Rfl:     furosemide (LASIX) 20 MG tablet, Take 1 tablet (20 mg total) by mouth every other day., Disp: 45 tablet, Rfl: 1    ipratropium (ATROVENT) 21 mcg (0.03 %) nasal spray, 2 sprays by Each Nostril route 3 (three) times daily., Disp: 30 mL, Rfl: 2    ketoconazole (NIZORAL) 2 % shampoo, Apply 1 application  topically 3 (three) times a week., Disp: , Rfl: 6    levocetirizine (XYZAL) 5 MG tablet, TAKE 1 TABLET (5 MG TOTAL) BY MOUTH EVERY EVENING., Disp: 90 tablet, Rfl: 3    meloxicam (MOBIC) 7.5 MG tablet, Take 1 tablet (7.5 mg total) by mouth once daily., Disp: 20 tablet, Rfl: 1    metoprolol succinate (TOPROL-XL) 25 MG 24 hr tablet, TAKE 1 TABLET (25 MG TOTAL) BY MOUTH ONCE DAILY. (Patient taking differently: Take 25 mg by mouth every evening.), Disp: 90 tablet, Rfl: 1    nitroGLYCERIN (NITROSTAT) 0.4 MG SL tablet, Place 1 tablet (0.4 mg total) under the tongue every 5 (five) minutes as needed., Disp: 25 tablet, Rfl: 0    olmesartan (BENICAR) 20 MG tablet, Take 1 tablet (20 mg total) by mouth 2 (two) times a day., Disp: 60 tablet, Rfl: 1    ondansetron (ZOFRAN) 4 MG tablet, Take 1 tablet (4 mg total) by mouth every 8 (eight) hours as needed for Nausea., Disp: 25 tablet, Rfl: 2    petrolat,wht/min oil/sod chl (DRY EYES OPHT), Place 1 drop into both eyes daily as needed (dry eyes)., Disp: , Rfl:     predniSONE (DELTASONE) 20 MG  tablet, Take 1 tablet (20 mg total) by mouth once daily., Disp: 4 tablet, Rfl: 0    valACYclovir (VALTREX) 1000 MG tablet, Take 1 tablet (1,000 mg total) by mouth 2 (two) times daily. for 10 days, Disp: 20 tablet, Rfl: 0    Constitution: Negative for fever or chills. Negative for weight loss or gain.   HENT: Negative for sore throat or headaches. Negative for rhinorrhea.  Eyes: Negative for blurred or double vision.   Cardiovascular: See above  Pulmonary: Negative for SOB. Negative for cough.   Gastrointestinal: Negative for abdominal pain. Negative for nausea/ vomiting. Negative for diarrhea.   : Negative for dysuria.   Neurological: Negative for focal weakness or sensory changes.    Objective:     Vitals:    05/10/24 1027   BP: (!) 189/116   Pulse:      General: NAD. AAO.  HENT: No scleral icterus. Extraocular movements intact.  Neck: No JVD  Cardiovascular: Regular heart rate and rhythm. S1/S2 appreciated.  Respiratory: CTAB. No increased work of breathing.  Extremities: Warm. No edema.  Skin: no ulceration or wounds present    Labs & Imaging   Reviewed in clinic today    Assessment:   Tayler Dominguez is a 78 y.o. y.o. female who presented today for evaluation of CTI dependent AFL. Anticoagulated with Eliquis. EF normal in Jan '24. No definitive AF. EF normal.     1. PAF (paroxysmal atrial fibrillation)        2. Atrial flutter, unspecified type        3. Congestive heart failure, unspecified HF chronicity, unspecified heart failure type        4. Chronic diastolic heart failure        5. Essential hypertension        6. Long term (current) use of anticoagulants          Plan:     - CTI RFA  - Increase Toprol to 50 mg QD    Aden Ro MD, PGY8  Electrophysiology

## 2024-05-10 ENCOUNTER — OFFICE VISIT (OUTPATIENT)
Dept: CARDIOLOGY | Facility: CLINIC | Age: 79
End: 2024-05-10
Payer: MEDICARE

## 2024-05-10 VITALS
DIASTOLIC BLOOD PRESSURE: 116 MMHG | SYSTOLIC BLOOD PRESSURE: 189 MMHG | HEIGHT: 67 IN | HEART RATE: 112 BPM | WEIGHT: 166.25 LBS | BODY MASS INDEX: 26.09 KG/M2

## 2024-05-10 DIAGNOSIS — I10 ESSENTIAL HYPERTENSION: ICD-10-CM

## 2024-05-10 DIAGNOSIS — I48.92 ATRIAL FLUTTER, UNSPECIFIED TYPE: ICD-10-CM

## 2024-05-10 DIAGNOSIS — I50.32 CHRONIC DIASTOLIC HEART FAILURE: ICD-10-CM

## 2024-05-10 DIAGNOSIS — I48.0 PAF (PAROXYSMAL ATRIAL FIBRILLATION): Primary | ICD-10-CM

## 2024-05-10 DIAGNOSIS — I50.9 CONGESTIVE HEART FAILURE, UNSPECIFIED HF CHRONICITY, UNSPECIFIED HEART FAILURE TYPE: ICD-10-CM

## 2024-05-10 DIAGNOSIS — Z79.01 LONG TERM (CURRENT) USE OF ANTICOAGULANTS: ICD-10-CM

## 2024-05-10 PROCEDURE — 99999 PR PBB SHADOW E&M-EST. PATIENT-LVL III: CPT | Mod: PBBFAC,,, | Performed by: INTERNAL MEDICINE

## 2024-05-10 PROCEDURE — 3080F DIAST BP >= 90 MM HG: CPT | Mod: CPTII,S$GLB,, | Performed by: INTERNAL MEDICINE

## 2024-05-10 PROCEDURE — 1126F AMNT PAIN NOTED NONE PRSNT: CPT | Mod: CPTII,S$GLB,, | Performed by: INTERNAL MEDICINE

## 2024-05-10 PROCEDURE — 93005 ELECTROCARDIOGRAM TRACING: CPT | Mod: PO

## 2024-05-10 PROCEDURE — 93010 ELECTROCARDIOGRAM REPORT: CPT | Mod: S$GLB,,, | Performed by: INTERNAL MEDICINE

## 2024-05-10 PROCEDURE — 99214 OFFICE O/P EST MOD 30 MIN: CPT | Mod: S$GLB,,, | Performed by: INTERNAL MEDICINE

## 2024-05-10 PROCEDURE — 3077F SYST BP >= 140 MM HG: CPT | Mod: CPTII,S$GLB,, | Performed by: INTERNAL MEDICINE

## 2024-05-10 RX ORDER — METOPROLOL SUCCINATE 50 MG/1
50 TABLET, EXTENDED RELEASE ORAL DAILY
Qty: 90 TABLET | Refills: 3 | Status: SHIPPED | OUTPATIENT
Start: 2024-05-10 | End: 2024-05-24

## 2024-05-11 LAB
OHS QRS DURATION: 92 MS
OHS QTC CALCULATION: 444 MS

## 2024-05-13 ENCOUNTER — TELEPHONE (OUTPATIENT)
Dept: ELECTROPHYSIOLOGY | Facility: CLINIC | Age: 79
End: 2024-05-13
Payer: MEDICARE

## 2024-05-13 DIAGNOSIS — I48.3 TYPICAL ATRIAL FLUTTER: Primary | ICD-10-CM

## 2024-05-13 DIAGNOSIS — I50.42 CHRONIC COMBINED SYSTOLIC AND DIASTOLIC HEART FAILURE: ICD-10-CM

## 2024-05-13 NOTE — TELEPHONE ENCOUNTER
Spoke with patient and offered to put her on the cancellation list. Advised that there is nothing available any earlier at this time. She verbalized understanding.

## 2024-05-13 NOTE — TELEPHONE ENCOUNTER
----- Message from Phuong Burrows sent at 5/13/2024  1:26 PM CDT -----  Regarding: Procedure ?  Pt [phone] would like a date sooner than 6/25/24 for the Ablation if possible, but not 6/18/24? Please call her at the number listed.    Thanks

## 2024-05-22 PROBLEM — I70.203 UNSPECIFIED ATHEROSCLEROSIS OF NATIVE ARTERIES OF EXTREMITIES, BILATERAL LEGS: Status: ACTIVE | Noted: 2024-05-22

## 2024-05-22 PROBLEM — I11.0 HYPERTENSIVE HEART DISEASE WITH HEART FAILURE: Status: ACTIVE | Noted: 2024-05-22

## 2024-05-30 ENCOUNTER — TELEPHONE (OUTPATIENT)
Dept: CARDIOLOGY | Facility: CLINIC | Age: 79
End: 2024-05-30
Payer: MEDICARE

## 2024-05-30 NOTE — TELEPHONE ENCOUNTER
Dental cleaning and procedures  Cv risk and med instructions   Taking eliquis  [phone]    No known cardiac devices

## 2024-06-06 ENCOUNTER — OFFICE VISIT (OUTPATIENT)
Dept: CARDIOLOGY | Facility: CLINIC | Age: 79
End: 2024-06-06
Payer: MEDICARE

## 2024-06-06 VITALS
SYSTOLIC BLOOD PRESSURE: 139 MMHG | DIASTOLIC BLOOD PRESSURE: 70 MMHG | HEART RATE: 63 BPM | HEIGHT: 67 IN | WEIGHT: 165.13 LBS | BODY MASS INDEX: 25.92 KG/M2

## 2024-06-06 DIAGNOSIS — I65.23 CAROTID ARTERY PLAQUE, BILATERAL: Chronic | ICD-10-CM

## 2024-06-06 DIAGNOSIS — I50.32 CHRONIC DIASTOLIC HEART FAILURE: Primary | Chronic | ICD-10-CM

## 2024-06-06 DIAGNOSIS — I10 ESSENTIAL HYPERTENSION: Chronic | ICD-10-CM

## 2024-06-06 DIAGNOSIS — I48.0 PAF (PAROXYSMAL ATRIAL FIBRILLATION): ICD-10-CM

## 2024-06-06 DIAGNOSIS — R94.30 ELEVATED LEFT VENTRICULAR END-DIASTOLIC PRESSURE (LVEDP): Chronic | ICD-10-CM

## 2024-06-06 DIAGNOSIS — R93.1 AGATSTON CORONARY ARTERY CALCIUM SCORE LESS THAN 100: Chronic | ICD-10-CM

## 2024-06-06 DIAGNOSIS — I48.3 TYPICAL ATRIAL FLUTTER: Chronic | ICD-10-CM

## 2024-06-06 PROCEDURE — 3288F FALL RISK ASSESSMENT DOCD: CPT | Mod: CPTII,S$GLB,, | Performed by: INTERNAL MEDICINE

## 2024-06-06 PROCEDURE — 1101F PT FALLS ASSESS-DOCD LE1/YR: CPT | Mod: CPTII,S$GLB,, | Performed by: INTERNAL MEDICINE

## 2024-06-06 PROCEDURE — 99214 OFFICE O/P EST MOD 30 MIN: CPT | Mod: S$GLB,,, | Performed by: INTERNAL MEDICINE

## 2024-06-06 PROCEDURE — 3075F SYST BP GE 130 - 139MM HG: CPT | Mod: CPTII,S$GLB,, | Performed by: INTERNAL MEDICINE

## 2024-06-06 PROCEDURE — 3078F DIAST BP <80 MM HG: CPT | Mod: CPTII,S$GLB,, | Performed by: INTERNAL MEDICINE

## 2024-06-06 PROCEDURE — 1159F MED LIST DOCD IN RCRD: CPT | Mod: CPTII,S$GLB,, | Performed by: INTERNAL MEDICINE

## 2024-06-06 PROCEDURE — 1126F AMNT PAIN NOTED NONE PRSNT: CPT | Mod: CPTII,S$GLB,, | Performed by: INTERNAL MEDICINE

## 2024-06-06 PROCEDURE — 99999 PR PBB SHADOW E&M-EST. PATIENT-LVL IV: CPT | Mod: PBBFAC,,, | Performed by: INTERNAL MEDICINE

## 2024-06-06 RX ORDER — SPIRONOLACTONE 25 MG/1
12.5 TABLET ORAL DAILY
Qty: 45 TABLET | Refills: 0 | Status: SHIPPED | OUTPATIENT
Start: 2024-06-06

## 2024-06-06 NOTE — PROGRESS NOTES
Subjective:    Patient ID:  Tayler Dominguez is a 78 y.o. female who presents for Atrial Fibrillation, Hypertension, and Heart Problem        Atrial Fibrillation  Symptoms include palpitations (OCC). Symptoms are negative for chest pain, dizziness, shortness of breath and syncope. Past medical history includes atrial fibrillation.   Hypertension  Associated symptoms include palpitations (OCC). Pertinent negatives include no blurred vision, chest pain, orthopnea, PND or shortness of breath.       S/P ER WITH FIB FLUTTER, INCREASED TOPROL, DOING BETTER FOR RFA ON 6/25, BP LOG, MOSTLY OK, BREATHING BETTER AFTER DAILY LASIX FOR 7 DAYS, CP RESOLVED, CHEST CT OK, SEE ROS  Past Medical History:   Diagnosis Date    Allergy     Arthritis     hands    Breast disorder     Crescendo angina     Edema     Heart murmur     MVP    History of 2019 novel coronavirus disease (COVID-19) 01/26/2021    Hypertension     Kidney stones     Menopause     Migraine headache     Mitral regurgitation     Near syncope     Sinusitis     SOB (shortness of breath)     Supraventricular tachycardia      Past Surgical History:   Procedure Laterality Date    ABDOMINAL SURGERY      APPENDECTOMY      BREAST BIOPSY      CARDIAC CATHETERIZATION  2013    CATARACT EXTRACTION W/  INTRAOCULAR LENS IMPLANT Bilateral     COLONOSCOPY  11/15/2013    Done by Dr. ELTON Lawrence -- report in paper chart    CORONARY ANGIOGRAPHY N/A 08/18/2020    Procedure: ANGIOGRAM, CORONARY ARTERY;  Surgeon: Anjelica James MD;  Location: [facility] CATH;  Service: Cardiology;  Laterality: N/A;    HYSTERECTOMY      PARTIAL @ 29 YO, COMPLETE @ 32 YO    LEFT HEART CATHETERIZATION N/A 08/18/2020    Procedure: Left heart cath;  Surgeon: Anjelica James MD;  Location: [facility] CATH;  Service: Cardiology;  Laterality: N/A;    RADIOFREQUENCY ABLATION  2013    at [facility] heart      Family History   Problem Relation Name Age of Onset    Cancer Mother      Aneurysm Mother      Hypertension Mother      Heart disease  Father      Hyperlipidemia Father      Hypertension Father      Diabetes Father      Heart disease Sister      Heart disease Sister      Heart disease Brother      Kidney disease Brother      Heart disease Brother      Cancer Brother       Social History     Socioeconomic History    Marital status:    Tobacco Use    Smoking status: Never     Passive exposure: Never    Smokeless tobacco: Never   Substance and Sexual Activity    Alcohol use: No    Drug use: No     Social Determinants of Health     Financial Resource Strain: Low Risk  (1/29/2024)    Overall Financial Resource Strain (CARDIA)     Difficulty of Paying Living Expenses: Not hard at all   Food Insecurity: No Food Insecurity (1/30/2024)    Hunger Vital Sign     Worried About Running Out of Food in the Last Year: Never true     Ran Out of Food in the Last Year: Never true   Transportation Needs: No Transportation Needs (1/30/2024)    PRAPARE - Transportation     Lack of Transportation (Medical): No     Lack of Transportation (Non-Medical): No   Physical Activity: Inactive (1/29/2024)    Exercise Vital Sign     Days of Exercise per Week: 0 days     Minutes of Exercise per Session: 0 min   Stress: No Stress Concern Present (1/29/2024)    [employer] - Occupational Stress Questionnaire     Feeling of Stress : Only a little   Housing Stability: Low Risk  (1/30/2024)    Housing Stability Vital Sign     Unable to Pay for Housing in the Last Year: No     Number of Places Lived in the Last Year: 1     Unstable Housing in the Last Year: No       Review of patient's allergies indicates:   Allergen Reactions    Ambien [zolpidem]     Cephalosporins     Cleocin [clindamycin hcl]     Codeine     Erythropoietin analogues     Influenza vaccine tri-sp 09-10     Levaquin [levofloxacin]     Macrobid [nitrofurantoin monohyd/m-cryst]     Morphine     Pcn [penicillins]     Sudal [phenylephrine-pot guaiacolsulf]     Sulfa (sulfonamide  antibiotics)        Current Outpatient Medications:     albuterol (PROVENTIL) 2.5 mg /3 mL (0.083 %) nebulizer solution, Take 3 mLs (2.5 mg total) by nebulization every 6 (six) hours as needed for Wheezing. Rescue, Disp: 30 mL, Rfl: 0    apixaban (ELIQUIS) 5 mg Tab, Take 1 tablet (5 mg total) by mouth 2 (two) times daily., Disp: 180 tablet, Rfl: 1    ascorbic acid, vitamin C, (VITAMIN C) 500 MG tablet, Take 500 mg by mouth daily as needed (during winter months)., Disp: , Rfl:     butalbital-acetaminophen-caffeine -40 mg (FIORICET, ESGIC) -40 mg per tablet, Take 1 tablet by mouth every 6 (six) hours as needed for Headaches., Disp: , Rfl:     cholecalciferol, vitamin D3, 50 mcg (2,000 unit) TbDL, Take 2,000 Units by mouth every evening., Disp: , Rfl:     furosemide (LASIX) 20 MG tablet, Take 1 tablet (20 mg total) by mouth every other day., Disp: 45 tablet, Rfl: 1    ipratropium (ATROVENT) 21 mcg (0.03 %) nasal spray, 2 sprays by Each Nostril route 3 (three) times daily., Disp: 30 mL, Rfl: 2    ketoconazole (NIZORAL) 2 % shampoo, Apply 1 application  topically 3 (three) times a week., Disp: , Rfl: 6    levocetirizine (XYZAL) 5 MG tablet, TAKE 1 TABLET (5 MG TOTAL) BY MOUTH EVERY EVENING., Disp: 90 tablet, Rfl: 3    meloxicam (MOBIC) 7.5 MG tablet, Take 1 tablet (7.5 mg total) by mouth once daily., Disp: 20 tablet, Rfl: 1    metoprolol succinate (TOPROL-XL) 50 MG 24 hr tablet, Take 1.5 tablets (75 mg total) by mouth once daily., Disp: 45 tablet, Rfl: 0    nitroGLYCERIN (NITROSTAT) 0.4 MG SL tablet, Place 1 tablet (0.4 mg total) under the tongue every 5 (five) minutes as needed., Disp: 25 tablet, Rfl: 0    olmesartan (BENICAR) 20 MG tablet, Take 1 tablet (20 mg total) by mouth 2 (two) times a day., Disp: 60 tablet, Rfl: 1    ondansetron (ZOFRAN) 4 MG tablet, Take 1 tablet (4 mg total) by mouth every 8 (eight) hours as needed for Nausea., Disp: 25 tablet, Rfl: 2    petrolat,wht/min oil/sod chl (DRY EYES  "OPHT), Place 1 drop into both eyes daily as needed (dry eyes)., Disp: , Rfl:     spironolactone (ALDACTONE) 25 MG tablet, Take 0.5 tablets (12.5 mg total) by mouth once daily., Disp: 45 tablet, Rfl: 0    valACYclovir (VALTREX) 1000 MG tablet, Take 1 tablet (1,000 mg total) by mouth 3 (three) times daily. for 7 days, Disp: 21 tablet, Rfl: 0    Review of Systems   Constitutional: Negative. Negative for chills and fever.        HOT FLASHES   HENT:  Negative for congestion and nosebleeds.    Eyes:  Negative for blurred vision and vision loss in right eye.   Cardiovascular:  Positive for dyspnea on exertion (MILD) and palpitations (OCC). Negative for chest pain, claudication, cyanosis, irregular heartbeat, leg swelling, near-syncope, orthopnea, paroxysmal nocturnal dyspnea and syncope.   Respiratory:  Negative for cough, hemoptysis, shortness of breath and wheezing.    Hematologic/Lymphatic: Negative for bleeding problem. Does not bruise/bleed easily.   Skin:  Negative for color change and rash.   Musculoskeletal:  Positive for arthritis. Negative for back pain and falls.        SENSITIVE LEGS   Gastrointestinal:  Negative for abdominal pain, dysphagia, jaundice, melena and nausea.   Genitourinary:  Negative for dysuria and flank pain.   Neurological:  Negative for dizziness, focal weakness, light-headedness, loss of balance and numbness.   Psychiatric/Behavioral:  Negative for altered mental status and depression.    Allergic/Immunologic: Negative for persistent infections.        Objective:      Vitals:    06/06/24 1529   BP: 139/70   Pulse: 63   Weight: 74.9 kg (165 lb 2 oz)   Height: 5' 7" (1.702 m)   PainSc: 0-No pain     Body mass index is 25.86 kg/m².    Physical Exam  Constitutional:       Appearance: Normal appearance.   HENT:      Head: Normocephalic and atraumatic.   Eyes:      General: No scleral icterus.     Extraocular Movements: Extraocular movements intact.   Neck:      Vascular: No carotid bruit. "   Cardiovascular:      Rate and Rhythm: Normal rate. Rhythm irregular. No extrasystoles are present.     Pulses: Normal pulses.           Carotid pulses are 2+ on the right side and 2+ on the left side.       Radial pulses are 2+ on the right side and 2+ on the left side.        Posterior tibial pulses are 2+ on the right side and 2+ on the left side.      Heart sounds: Murmur heard.      Systolic murmur is present with a grade of 1/6 at the lower left sternal border and apex.      No friction rub. No gallop.   Pulmonary:      Effort: Pulmonary effort is normal.      Breath sounds: Normal breath sounds and air entry.   Abdominal:      Palpations: Abdomen is soft.      Tenderness: There is no abdominal tenderness.   Musculoskeletal:      Cervical back: Neck supple.      Right lower leg: No edema.      Left lower leg: No edema.   Skin:     Capillary Refill: Capillary refill takes less than 2 seconds.   Neurological:      General: No focal deficit present.      Mental Status: She is alert and oriented to person, place, and time.      Cranial Nerves: Cranial nerves 2-12 are intact.   Psychiatric:         Mood and Affect: Mood normal.         Speech: Speech normal.         Behavior: Behavior normal.                 ..    Chemistry        Component Value Date/Time     05/24/2024 1130    K 4.5 05/24/2024 1130     05/24/2024 1130    CO2 31 05/24/2024 1130    BUN 23 (H) 05/24/2024 1130    CREATININE 0.79 05/24/2024 1130     (H) 05/24/2024 1130        Component Value Date/Time    CALCIUM 9.7 05/24/2024 1130    ALKPHOS 71 05/24/2024 1130    AST 57 (H) 05/24/2024 1130    AST 37 (H) 04/18/2016 2014    ALT 40 (H) 05/24/2024 1130    BILITOT 0.9 05/24/2024 1130    ESTGFRAFRICA >60.0 11/24/2021 1100    EGFRNONAA >60.0 11/24/2021 1100            ..  Lab Results   Component Value Date    CHOL 171 01/30/2024    CHOL 209 (H) 02/08/2023    CHOL 218 (H) 08/19/2022     Lab Results   Component Value Date    HDL 59  01/30/2024    HDL 64 02/08/2023    HDL 76 (H) 08/19/2022     Lab Results   Component Value Date    LDLCALC 82.6 01/30/2024    LDLCALC 107.0 02/08/2023    LDLCALC 120.8 08/19/2022     Lab Results   Component Value Date    TRIG 147 01/30/2024    TRIG 190 (H) 02/08/2023    TRIG 106 08/19/2022     Lab Results   Component Value Date    CHOLHDL 34.5 01/30/2024    CHOLHDL 30.6 02/08/2023    CHOLHDL 34.9 08/19/2022     ..  Lab Results   Component Value Date    WBC 8.84 05/24/2024    HGB 14.7 05/24/2024    HCT 47.1 05/24/2024    MCV 90 05/24/2024     05/24/2024       Test(s) Reviewed  I have reviewed the following in detail:  [] Stress test   [] Angiography   [] Echocardiogram   [x] Labs   [x] Other:       Assessment:         ICD-10-CM ICD-9-CM   1. Chronic diastolic heart failure  I50.32 428.32   2. Carotid artery plaque, bilateral  I65.23 433.10     433.30   3. Typical atrial flutter  I48.3 427.32   4. Elevated left ventricular end-diastolic pressure (LVEDP)  R94.30 794.30   5. Essential hypertension  I10 401.9   6. PAF (paroxysmal atrial fibrillation)  I48.0 427.31   7. 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  R93.1 793.2     Problem List Items Addressed This Visit          Cardiac/Vascular    Essential hypertension    Relevant Orders    Hypertension Digital Medicine (HDMP) Enrollment Order (Completed)    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    Elevated left ventricular end-diastolic pressure (LVEDP)    Carotid artery plaque, bilateral    PAF (paroxysmal atrial fibrillation)    Atrial flutter    Chronic diastolic heart failure - Primary        Plan:     ADD SPIRONOLACTONE, EARLY DIASTOLIC HEART FAILURE SYMPTOMS, TO HAVE ABLATION FOR FLUTTER FIB, HEART RATE BETTER CONTROLLED, DAILY WEIGHT 2 LB PER DAY 5 LB PER WEEK RULE EXPLAINED, NO ANGINA NO TIA TYPE SYMPTOMS RETURN TO CLINIC IN 4 MO      Chronic diastolic heart failure    Carotid artery plaque, bilateral    Typical atrial  flutter    Elevated left ventricular end-diastolic pressure (LVEDP)    Essential hypertension  -     Hypertension Digital Medicine (HDMP) Enrollment Order    PAF (paroxysmal atrial fibrillation)    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    Other orders  -     spironolactone (ALDACTONE) 25 MG tablet; Take 0.5 tablets (12.5 mg total) by mouth once daily.  Dispense: 45 tablet; Refill: 0    RTC Low level/low impact aerobic exercise 5x's/wk. Heart healthy diet and risk factor modification.    See labs and med orders.    Aerobic exercise 5x's/wk. Heart healthy diet and risk factor modification.    See labs and med orders.

## 2024-06-24 ENCOUNTER — ANESTHESIA EVENT (OUTPATIENT)
Dept: MEDSURG UNIT | Facility: HOSPITAL | Age: 79
End: 2024-06-24
Payer: MEDICARE

## 2024-06-24 ENCOUNTER — TELEPHONE (OUTPATIENT)
Dept: ELECTROPHYSIOLOGY | Facility: CLINIC | Age: 79
End: 2024-06-24
Payer: MEDICARE

## 2024-06-24 NOTE — TELEPHONE ENCOUNTER
Spoke to patient    CONFIRMED procedure arrival time of 12noon tomorrow for CTI RFA with Dr Gonsalez    Reiterated instructions including:  -Directions to check in desk  -NPO after midnight night prior to procedure  -Confirmed compliance of Eliquis; she understands to take it this evening but NOT in am prior to procedure  -Pre-procedure LABS reviewed; no alerts noted  -Confirmed absence or presence of implanted device/stimulator -denies any implants  -Confirmed no fever, cough, or shortness of breath in the past 30 days  -Confirmed no redness, rash, irritation, or yeast infection to groin area.   -Do not wear mascara day of procedure  -Reviewed current visitor policy    Patient verbalized understanding of above and appreciated the call.

## 2024-06-24 NOTE — TELEPHONE ENCOUNTER
----- Message from Savanah Lowe sent at 6/24/2024  1:47 PM CDT -----  Regarding: return call  Pt is returning your call and can be reached at [phone].    Thank you

## 2024-06-24 NOTE — ANESTHESIA PREPROCEDURE EVALUATION
06/24/2024  Tayler Dominguez is a 78 y.o., female.  Patient Active Problem List   Diagnosis    Edema    Menopause    Allergic rhinitis    Migraine    Mastoiditis of left side    Urticaria of unknown origin    SOB (shortness of breath)    Tachycardia    Lower leg edema    Non-rheumatic mitral regurgitation    Premature beats    Essential hypertension    Pulmonary nodule    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    Shingles outbreak    Renal colic on left side    Atypical chest pain    Bruit of right carotid artery    Kidney stone    Near syncope    Precordial pain    Elevated left ventricular end-diastolic pressure (LVEDP)    Carotid artery plaque, bilateral    Hypercholesterolemia    Bilateral leg edema    Bilateral cold feet    PAF (paroxysmal atrial fibrillation)    CHF (congestive heart failure)    Atrial flutter    Basal cell carcinoma of eyelid    History of malignant neoplasm of skin    Nail dystrophy    Seborrheic keratosis, inflamed    Chronic diastolic heart failure    Long term (current) use of anticoagulants    Disorder of arteries and arterioles, unspecified    Hypertensive heart disease with heart failure    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       I have reviewed the Medications.     Review of Systems  Anesthesia Hx:  No problems with previous Anesthesia               Denies Personal Hx of Anesthesia complications.                    Cardiovascular:     Hypertension      Angina CHF          Cardiovascular Symptoms: Angina   Shortness of Breath       Congestive Heart Failure (CHF)                Hypertension     Atrial Fibrillation, Atrial Flutter     Pulmonary:      Shortness of breath                  Renal/:  Chronic Renal Disease        Kidney Function/Disease              Neurological:      Headaches      Dx of Headaches                               Physical Exam    Airway:  No airway management difficulties anticipated  Dental:  No active dental issues noted  Chest/Lungs:  Clear to auscultation    Heart:  Rate: Normal  Rhythm: Regular Rhythm  Sounds: Normal        Anesthesia Plan  Type of Anesthesia, risks & benefits discussed:    Anesthesia Type: Gen Natural Airway, Gen Supraglottic Airway  Informed Consent: Informed consent signed with the Patient and all parties understand the risks and agree with anesthesia plan.  All questions answered.   ASA Score: 3  Anesthesia Plan Notes: Chart reviewed. Patient seen and examined. Anesthesia plan discussed and questions answered. E-consent signed. Leeroy Friedman MD    Ready For Surgery From Anesthesia Perspective.     .

## 2024-06-24 NOTE — TELEPHONE ENCOUNTER
Left message for patient to return call to review pre op instructions for CTI RFA with Dr Gonsalez tomorrow. The following reminders were left:   Call back number left.   NPO after 12mn tonight  Take Eliquis this evening but NOT in am  Arrival time 12noon, 3rd floor SSCU  Bring remote if any implantable devices

## 2024-06-25 ENCOUNTER — ANESTHESIA (OUTPATIENT)
Dept: MEDSURG UNIT | Facility: HOSPITAL | Age: 79
End: 2024-06-25
Payer: MEDICARE

## 2024-06-25 ENCOUNTER — HOSPITAL ENCOUNTER (OUTPATIENT)
Dept: CARDIOLOGY | Facility: HOSPITAL | Age: 79
Discharge: HOME | End: 2024-06-25
Attending: INTERNAL MEDICINE | Admitting: INTERNAL MEDICINE
Payer: MEDICARE

## 2024-06-25 ENCOUNTER — HOSPITAL ENCOUNTER (OUTPATIENT)
Facility: HOSPITAL | Age: 79
Discharge: HOME | End: 2024-06-26
Attending: INTERNAL MEDICINE | Admitting: INTERNAL MEDICINE
Payer: MEDICARE

## 2024-06-25 VITALS
HEIGHT: 66 IN | WEIGHT: 160 LBS | SYSTOLIC BLOOD PRESSURE: 171 MMHG | DIASTOLIC BLOOD PRESSURE: 81 MMHG | BODY MASS INDEX: 25.71 KG/M2

## 2024-06-25 DIAGNOSIS — Z86.79 STATUS POST RADIOFREQUENCY ABLATION (RFA) OPERATION FOR ARRHYTHMIA: ICD-10-CM

## 2024-06-25 DIAGNOSIS — I49.9 ARRHYTHMIA: ICD-10-CM

## 2024-06-25 DIAGNOSIS — I48.3 TYPICAL ATRIAL FLUTTER: Primary | ICD-10-CM

## 2024-06-25 DIAGNOSIS — I48.3 TYPICAL ATRIAL FLUTTER: ICD-10-CM

## 2024-06-25 DIAGNOSIS — I50.42 CHRONIC COMBINED SYSTOLIC AND DIASTOLIC HEART FAILURE: ICD-10-CM

## 2024-06-25 DIAGNOSIS — Z98.890 STATUS POST RADIOFREQUENCY ABLATION (RFA) OPERATION FOR ARRHYTHMIA: ICD-10-CM

## 2024-06-25 LAB
ASCENDING AORTA: 3 CM
BSA FOR ECHO PROCEDURE: 1.84 M2
EJECTION FRACTION: 50 %
LAA PV: 20 CM/S
OHS QRS DURATION: 98 MS
OHS QTC CALCULATION: 434 MS
SINUS: 3.3 CM
STJ: 2.9 CM

## 2024-06-25 PROCEDURE — C1733 CATH, EP, OTHR THAN COOL-TIP: HCPCS | Performed by: INTERNAL MEDICINE

## 2024-06-25 PROCEDURE — 93653 COMPRE EP EVAL TX SVT: CPT | Mod: ,,, | Performed by: INTERNAL MEDICINE

## 2024-06-25 PROCEDURE — 25000003 PHARM REV CODE 250: Performed by: STUDENT IN AN ORGANIZED HEALTH CARE EDUCATION/TRAINING PROGRAM

## 2024-06-25 PROCEDURE — 93312 ECHO TRANSESOPHAGEAL: CPT | Mod: 26,,, | Performed by: INTERNAL MEDICINE

## 2024-06-25 PROCEDURE — 25000003 PHARM REV CODE 250: Performed by: NURSE ANESTHETIST, CERTIFIED REGISTERED

## 2024-06-25 PROCEDURE — 27201423 OPTIME MED/SURG SUP & DEVICES STERILE SUPPLY: Performed by: INTERNAL MEDICINE

## 2024-06-25 PROCEDURE — C1894 INTRO/SHEATH, NON-LASER: HCPCS | Performed by: INTERNAL MEDICINE

## 2024-06-25 PROCEDURE — 93325 DOPPLER ECHO COLOR FLOW MAPG: CPT | Mod: 26,,, | Performed by: INTERNAL MEDICINE

## 2024-06-25 PROCEDURE — 63600175 PHARM REV CODE 636 W HCPCS: Mod: JZ,JG | Performed by: STUDENT IN AN ORGANIZED HEALTH CARE EDUCATION/TRAINING PROGRAM

## 2024-06-25 PROCEDURE — C1730 CATH, EP, 19 OR FEW ELECT: HCPCS | Performed by: INTERNAL MEDICINE

## 2024-06-25 PROCEDURE — 93010 ELECTROCARDIOGRAM REPORT: CPT | Mod: ,,, | Performed by: INTERNAL MEDICINE

## 2024-06-25 PROCEDURE — 93320 DOPPLER ECHO COMPLETE: CPT | Mod: 26,,, | Performed by: INTERNAL MEDICINE

## 2024-06-25 PROCEDURE — 93653 COMPRE EP EVAL TX SVT: CPT | Performed by: INTERNAL MEDICINE

## 2024-06-25 PROCEDURE — 25000003 PHARM REV CODE 250: Performed by: INTERNAL MEDICINE

## 2024-06-25 PROCEDURE — 63600175 PHARM REV CODE 636 W HCPCS: Performed by: NURSE ANESTHETIST, CERTIFIED REGISTERED

## 2024-06-25 PROCEDURE — 37000009 HC ANESTHESIA EA ADD 15 MINS: Performed by: INTERNAL MEDICINE

## 2024-06-25 PROCEDURE — 37000008 HC ANESTHESIA 1ST 15 MINUTES: Performed by: INTERNAL MEDICINE

## 2024-06-25 PROCEDURE — 93005 ELECTROCARDIOGRAM TRACING: CPT

## 2024-06-25 PROCEDURE — 93325 DOPPLER ECHO COLOR FLOW MAPG: CPT

## 2024-06-25 RX ORDER — HYDRALAZINE HYDROCHLORIDE 25 MG/1
25 TABLET, FILM COATED ORAL ONCE
Status: COMPLETED | OUTPATIENT
Start: 2024-06-25 | End: 2024-06-25

## 2024-06-25 RX ORDER — FUROSEMIDE 20 MG/1
20 TABLET ORAL EVERY OTHER DAY
Status: DISCONTINUED | OUTPATIENT
Start: 2024-06-26 | End: 2024-06-26 | Stop reason: HOSPADM

## 2024-06-25 RX ORDER — DIPHENHYDRAMINE HYDROCHLORIDE 50 MG/ML
25 INJECTION INTRAMUSCULAR; INTRAVENOUS EVERY 6 HOURS PRN
Status: DISCONTINUED | OUTPATIENT
Start: 2024-06-25 | End: 2024-06-26 | Stop reason: HOSPADM

## 2024-06-25 RX ORDER — HEPARIN SOD,PORCINE/0.9 % NACL 1000/500ML
INTRAVENOUS SOLUTION INTRAVENOUS
Status: DISCONTINUED | OUTPATIENT
Start: 2024-06-25 | End: 2024-06-25

## 2024-06-25 RX ORDER — FENTANYL CITRATE 50 UG/ML
INJECTION, SOLUTION INTRAMUSCULAR; INTRAVENOUS
Status: DISCONTINUED | OUTPATIENT
Start: 2024-06-25 | End: 2024-06-25

## 2024-06-25 RX ORDER — LABETALOL HCL 20 MG/4 ML
10 SYRINGE (ML) INTRAVENOUS ONCE
Status: COMPLETED | OUTPATIENT
Start: 2024-06-25 | End: 2024-06-25

## 2024-06-25 RX ORDER — LIDOCAINE HYDROCHLORIDE AND EPINEPHRINE 10; 10 MG/ML; UG/ML
5 INJECTION, SOLUTION INFILTRATION; PERINEURAL ONCE
Status: COMPLETED | OUTPATIENT
Start: 2024-06-25 | End: 2024-06-25

## 2024-06-25 RX ORDER — PROPOFOL 10 MG/ML
VIAL (ML) INTRAVENOUS
Status: DISCONTINUED | OUTPATIENT
Start: 2024-06-25 | End: 2024-06-25

## 2024-06-25 RX ORDER — CETIRIZINE HYDROCHLORIDE 5 MG/1
5 TABLET ORAL ONCE AS NEEDED
Status: DISCONTINUED | OUTPATIENT
Start: 2024-06-25 | End: 2024-06-26 | Stop reason: HOSPADM

## 2024-06-25 RX ORDER — MIDAZOLAM HYDROCHLORIDE 1 MG/ML
INJECTION INTRAMUSCULAR; INTRAVENOUS
Status: DISCONTINUED | OUTPATIENT
Start: 2024-06-25 | End: 2024-06-25

## 2024-06-25 RX ORDER — LIDOCAINE HYDROCHLORIDE 20 MG/ML
INJECTION, SOLUTION EPIDURAL; INFILTRATION; INTRACAUDAL; PERINEURAL
Status: DISCONTINUED | OUTPATIENT
Start: 2024-06-25 | End: 2024-06-25

## 2024-06-25 RX ORDER — LIDOCAINE HYDROCHLORIDE 20 MG/ML
INJECTION, SOLUTION INFILTRATION; PERINEURAL
Status: DISCONTINUED | OUTPATIENT
Start: 2024-06-25 | End: 2024-06-25

## 2024-06-25 RX ORDER — LOSARTAN POTASSIUM 50 MG/1
50 TABLET ORAL DAILY
Status: DISCONTINUED | OUTPATIENT
Start: 2024-06-26 | End: 2024-06-26

## 2024-06-25 RX ORDER — ACETAMINOPHEN 325 MG/1
650 TABLET ORAL EVERY 4 HOURS PRN
Status: DISCONTINUED | OUTPATIENT
Start: 2024-06-25 | End: 2024-06-26 | Stop reason: HOSPADM

## 2024-06-25 RX ORDER — CETIRIZINE HYDROCHLORIDE 5 MG/1
5 TABLET ORAL ONCE
Status: DISCONTINUED | OUTPATIENT
Start: 2024-06-26 | End: 2024-06-25

## 2024-06-25 RX ORDER — ONDANSETRON HYDROCHLORIDE 2 MG/ML
4 INJECTION, SOLUTION INTRAVENOUS ONCE AS NEEDED
Status: DISCONTINUED | OUTPATIENT
Start: 2024-06-25 | End: 2024-06-26 | Stop reason: HOSPADM

## 2024-06-25 RX ORDER — TALC
6 POWDER (GRAM) TOPICAL NIGHTLY PRN
Status: DISCONTINUED | OUTPATIENT
Start: 2024-06-26 | End: 2024-06-26 | Stop reason: HOSPADM

## 2024-06-25 RX ADMIN — FENTANYL CITRATE 25 MCG: 50 INJECTION, SOLUTION INTRAMUSCULAR; INTRAVENOUS at 04:06

## 2024-06-25 RX ADMIN — PROPOFOL 20 MG: 10 INJECTION, EMULSION INTRAVENOUS at 02:06

## 2024-06-25 RX ADMIN — METOPROLOL SUCCINATE 75 MG: 50 TABLET, EXTENDED RELEASE ORAL at 11:06

## 2024-06-25 RX ADMIN — SODIUM CHLORIDE: 9 INJECTION, SOLUTION INTRAVENOUS at 02:06

## 2024-06-25 RX ADMIN — PROPOFOL 30 MG: 10 INJECTION, EMULSION INTRAVENOUS at 02:06

## 2024-06-25 RX ADMIN — LIDOCAINE HYDROCHLORIDE,EPINEPHRINE BITARTRATE 5 ML: 10; .01 INJECTION, SOLUTION INFILTRATION; PERINEURAL at 08:06

## 2024-06-25 RX ADMIN — APIXABAN 5 MG: 5 TABLET, FILM COATED ORAL at 10:06

## 2024-06-25 RX ADMIN — MIDAZOLAM HYDROCHLORIDE 1 MG: 2 INJECTION, SOLUTION INTRAMUSCULAR; INTRAVENOUS at 02:06

## 2024-06-25 RX ADMIN — FENTANYL CITRATE 25 MCG: 50 INJECTION, SOLUTION INTRAMUSCULAR; INTRAVENOUS at 02:06

## 2024-06-25 RX ADMIN — HYDRALAZINE HYDROCHLORIDE 25 MG: 25 TABLET ORAL at 08:06

## 2024-06-25 RX ADMIN — ACETAMINOPHEN 650 MG: 325 TABLET ORAL at 08:06

## 2024-06-25 RX ADMIN — Medication 10 MG: at 06:06

## 2024-06-25 RX ADMIN — LIDOCAINE HYDROCHLORIDE 50 MG: 20 INJECTION, SOLUTION EPIDURAL; INFILTRATION; INTRACAUDAL; PERINEURAL at 02:06

## 2024-06-25 NOTE — H&P
[facility]  Electrophysiology  H&P    Patient Name: Tayler Dominguez  MRN: [id number]    Subjective:   Tayler Dominguez is a 78 y.o. female with a PMHx significant for persistent CTI dependent AFL(ABPHL-0-NIFj 5), PE 1/2024, HTN, and HFpEF. Anticoagulated with Eliquis. EF normal in Jan 2024. No definitive AF.    Recently evaluated for and completed treatment for shingles.    Tayler Dominguez is here today for a RF-CTI for treatment of typical atrial flutter    Previous DCCV and/or procedural history:  Reported ablation 20yrs ago for SVT?    Today's ECG: typical atrial flutter  Anticoagulation: apixiban (Last dose 6/24)  TTE: EF 55-60%  Hgb: 14.7  Cr: 0.79    History:     Past Medical History:   Diagnosis Date    Allergy     Arthritis     hands    Breast disorder     Crescendo angina     Edema     Heart murmur     MVP    History of 2019 novel coronavirus disease (COVID-19) 01/26/2021    Hypertension     Kidney stones     Menopause     Migraine headache     Mitral regurgitation     Near syncope     Sinusitis     SOB (shortness of breath)     Supraventricular tachycardia       Past Surgical History:   Procedure Laterality Date    ABDOMINAL SURGERY      APPENDECTOMY      BREAST BIOPSY      CARDIAC CATHETERIZATION  2013    CATARACT EXTRACTION W/  INTRAOCULAR LENS IMPLANT Bilateral     COLONOSCOPY  11/15/2013    Done by Dr. ELTON Lawrence -- report in paper chart    CORONARY ANGIOGRAPHY N/A 08/18/2020    Procedure: ANGIOGRAM, CORONARY ARTERY;  Surgeon: Anjelica James MD;  [facility];  Service: Cardiology;  Laterality: N/A;    HYSTERECTOMY      PARTIAL @ 29 YO, COMPLETE @ 32 YO    LEFT HEART CATHETERIZATION N/A 08/18/2020    Procedure: Left heart cath;  Surgeon: Anjelica James MD;  [facility];  Service: Cardiology;  Laterality: N/A;    RADIOFREQUENCY ABLATION  2013    at [facility] heart         Meds:     Review of patient's allergies indicates:   Allergen Reactions    Ambien [zolpidem] Rash     Cephalosporins Rash    Cleocin [clindamycin hcl] Rash    Codeine Rash    Erythropoietin analogues Rash    Influenza vaccine tri-sp 09-10 Rash    Levaquin [levofloxacin] Rash    Macrobid [nitrofurantoin monohyd/m-cryst] Rash    Morphine Rash    Pcn [penicillins] Rash    Sudal [phenylephrine-pot guaiacolsulf] Rash    Sulfa (sulfonamide antibiotics) Rash     Current Outpatient Medications   Medication Instructions    albuterol (PROVENTIL) 2.5 mg, Nebulization, Every 6 hours PRN, Rescue    apixaban (ELIQUIS) 5 mg, Oral, 2 times daily    ascorbic acid (vitamin C) (VITAMIN C) 500 mg, Oral, Daily PRN    butalbital-acetaminophen-caffeine -40 mg (FIORICET, ESGIC) -40 mg per tablet 1 tablet, Oral, Every 6 hours PRN    cholecalciferol (vitamin D3) 2,000 Units, Oral, Nightly    furosemide (LASIX) 20 mg, Oral, Every other day    ipratropium (ATROVENT) 21 mcg (0.03 %) nasal spray 2 sprays, Each Nostril, 3 times daily    ketoconazole (NIZORAL) 2 % shampoo 1 application , Topical (Top), Three times weekly    levocetirizine (XYZAL) 5 mg, Oral, Nightly    meloxicam (MOBIC) 7.5 mg, Oral, Daily    metoprolol succinate (TOPROL-XL) 75 mg, Oral, Daily    nitroGLYCERIN (NITROSTAT) 0.4 mg, Sublingual, Every 5 min PRN    olmesartan (BENICAR) 20 mg, Oral, 2 times daily    ondansetron (ZOFRAN) 4 mg, Oral, Every 8 hours PRN    petrolat,wht/min oil/sod chl (DRY EYES OPHT) 1 drop, Both Eyes, Daily PRN    spironolactone (ALDACTONE) 12.5 mg, Oral, Daily    valACYclovir (VALTREX) 1,000 mg, Oral, 3 times daily     Review of Systems   Constitutional:  Positive for malaise/fatigue. Negative for chills, diaphoresis and fever.   HENT:  Negative for sore throat.    Eyes:  Negative for double vision.   Respiratory:  Positive for shortness of breath. Negative for cough and wheezing.    Cardiovascular:  Negative for chest pain, palpitations, orthopnea, claudication, leg swelling and PND.   Gastrointestinal:  Negative for abdominal pain, blood in  "stool, constipation, heartburn, nausea and vomiting.   Genitourinary:  Negative for hematuria.   Musculoskeletal:  Negative for falls and myalgias.   Neurological:  Negative for dizziness, loss of consciousness, weakness and headaches.   Psychiatric/Behavioral:  The patient is not nervous/anxious.      Objective:   BP (!) 171/81 (BP Location: Left arm, Patient Position: Lying)   Pulse 97   Temp 97.8 °F (36.6 °C) (Temporal)   Resp 18   Ht 5' 6" (1.676 m)   Wt 72.6 kg (160 lb)   SpO2 95%   Breastfeeding No   BMI 25.82 kg/m²   Physical Exam  Constitutional:       General: She is not in acute distress.  HENT:      Head: Normocephalic and atraumatic.   Eyes:      Extraocular Movements: Extraocular movements intact.      Pupils: Pupils are equal, round, and reactive to light.   Neck:      Vascular: No JVD.   Cardiovascular:      Rate and Rhythm: Normal rate and regular rhythm.      Heart sounds: Normal heart sounds. No murmur heard.     No friction rub. No gallop.   Pulmonary:      Effort: Pulmonary effort is normal. No respiratory distress.      Breath sounds: Normal breath sounds. No wheezing or rales.   Chest:      Chest wall: No tenderness.   Abdominal:      General: Bowel sounds are normal. There is no distension.      Palpations: Abdomen is soft.      Tenderness: There is no abdominal tenderness.   Musculoskeletal:      Right lower leg: No edema.      Left lower leg: No edema.   Skin:     General: Skin is warm and dry.      Findings: No erythema.   Neurological:      Mental Status: She is alert and oriented to person, place, and time.   Psychiatric:         Mood and Affect: Mood and affect normal.         Cognition and Memory: Memory normal.         Judgment: Judgment normal.       Labs:     Lab Results   Component Value Date     05/24/2024    K 4.5 05/24/2024     05/24/2024    CO2 31 05/24/2024    BUN 23 (H) 05/24/2024    CREATININE 0.79 05/24/2024    ANIONGAP 8 05/24/2024     Lab Results "   Component Value Date    HGBA1C 5.3 02/08/2023     Lab Results   Component Value Date     (H) 04/22/2024     11/23/2015    Lab Results   Component Value Date    WBC 8.84 05/24/2024    HGB 14.7 05/24/2024    HCT 47.1 05/24/2024     05/24/2024    GRAN 6.7 05/24/2024    GRAN 75.4 (H) 05/24/2024     Lab Results   Component Value Date    CHOL 171 01/30/2024    HDL 59 01/30/2024    LDLCALC 82.6 01/30/2024    TRIG 147 01/30/2024          Assessment & Plan:     Tayler Dominguez is a 78 y.o. y.o. female with PMHx significant for persistent CTI dependent AFL(JNQDR-0-IEYb 5), PE 1/2024, HTN, and HFpEF. Anticoagulated with Eliquis. EF normal in Jan 2024. No definitive AF.    Plan:  - RF-CTI  - Anesthesia  - CLARY  - PAULIE  - PPI x 30 days    Anticoagulation: apixiban  EF (most recent): 55-60%  AAD/AVN agents: toprol 50mg daily    The risks, benefits and alternatives of the procedure were explained to the patient, patient's family and/or surrogate decision maker. Risks include (but not limited to) bleeding, hematoma, infection, pain, vascular damage, damage to structures surrounding the vasculature, myocardial damage [perforation, valvular damage], cardiac tamponade, phrenic nerve damage, pulmonary vein stenosis, atrioesophageal (AE) fistula, CVA, MI, and death. Patient is understanding that repeat ablations may be required. All questions were answered. Patient is understanding of these risks, and would like to proceed. Consents signed.     Case discussed with Dr. Gonsalez.    Signed:  Raul Fernandes MD  Cardiovascular Disease PGY-V  [facility]

## 2024-06-25 NOTE — TRANSFER OF CARE
"Anesthesia Transfer of Care Note    Patient: Tayler Domignuez    Procedure(s) Performed: Procedure(s) (LRB):  Ablation, Atrial Flutter, Typical (N/A)  Transesophageal echo (PAULIE) intra-procedure log documentation (N/A)    Patient location: Other: EP postop    Anesthesia Type: MAC    Transport from OR: Transported from OR on 6-10 L/min O2 by face mask with adequate spontaneous ventilation    Post pain: adequate analgesia    Post assessment: no apparent anesthetic complications and tolerated procedure well    Post vital signs: stable    Level of consciousness: awake    Nausea/Vomiting: no nausea/vomiting    Complications: none    Transfer of care protocol was followed    Last vitals: Visit Vitals  BP (!) 171/81 (BP Location: Left arm, Patient Position: Lying)   Pulse 97   Temp 36.6 °C (97.8 °F) (Temporal)   Resp 18   Ht 5' 6" (1.676 m)   Wt 72.6 kg (160 lb)   SpO2 95%   Breastfeeding No   BMI 25.82 kg/m²     "

## 2024-06-25 NOTE — BRIEF OP NOTE
Attending: Dung Gonsalez MD  Date of Procedure: 06/25/2024    Post-operative Diagnosis: CTI-dependent atrial flutter    Procedure Performed: Radiofrequency ablation of the CTI.     Description of Procedure: The patient was brought to the EP lab in the fasting state. Prepped and draped in sterile fashion. Safety timeout was performed. Sedation administered by anesthesia staff. Ultrasound guided venous access of the right femoral vein was performed x3. HALO, CS, and ablation catheters placed. RFA to the CTI with confirmation of bidirectional block.     EBL: <10 mL    Specimens: none  Complications: no immediate    Plan:  Bedrest x 4 hours  ECG on upon arrival to PACU  Resume oral anticoagulation following bedrest if there is no evidence of access site bleeding or hematoma  Medication changes: none  Plan for discharge following bedrest if patient tolerating PO intake, voiding, and ambulatory without evidence of complications     The attending physician was present for entire duration of the procedure    Raul Fernandes MD  Cardiovascular Disease PGY-V  [facility]

## 2024-06-25 NOTE — DISCHARGE SUMMARY
Electrophysiology  Discharge Summary      Admit Date: 6/25/2024  Discharge Date:  6/26/2024  Attending Physician: Dung Gonsalez MD  Discharge Physician: Raul Fernandes MD    Principal Diagnoses: Typical atrial flutter  Indication for Admission: Ablation, Atrial Flutter, Typical (N/A), Transesophageal echo (PAULIE) intra-procedure log documentation (N/A)    Discharged Condition: Good    Hospital Course:   Patient underwent successful RFA of the CTI for treatment of CTI-dependent atrial flutter. No evidence of intra- or post-procedure complications. Post-ablation ECG shows NSR, and no acute abnormalities. Monitored overnight due to late bed rest. Mild oozing from R groin, injected overnight with lidocaine-epinephrine with no further bleeding. No tenderness, swelling of R groin.    EP medications at discharge:  Antiarrhythmics and/or AVN agents: unchanged.   Patient was instructed to continue their oral anticoagulation as previously prescribed  Patient was instructed to take ibuprofen 800 mg TID x 3 days for pericarditis.     Groin access sites without hematoma or bleeding. Activity restrictions given to patient. Instructed to seek medical attention for shortness of breath, chest discomfort not alleviated with NSAIDs, bleeding/hematoma formation at the access sites, acute onset of neurologic symptoms, N/V, or hematemesis. At discharge the patient denied CP, SOB, access site bleeding/hematoma, or any other complaints or evidence of complications.    Discharge physical exam:  General: NAD. AAOx4  HENT: PERRL. EOMI  CV: RRR. No murmurs, rubs or gallops appreciaated  Resp: No increased WOB. Sating well on RA. No wheezing, rubs or crackles appreciated.  Abd: Soft. Nontender and nondistended. No organomegaly.  Ext: No edema. No rashes. R groin c/d/I, soft and non-tender    Medications:  Make sure to continue taking your blood thinner apixiban (trade name: Eliquis) after your procedure as you would normally take  Take  "pantoprazole (trade name: Protonix) nightly for at least 30 days after your procedure. This is to protect your esophagus during the post-operative period.  If given a prescription of furosemide (trade name: Lasix), which is a diuretic (fluid pill), you can take it daily or twice daily as needed for fluid retention or shortness of breath following your ablation  You may experience chest discomfort (also known as "pericarditis") with deep breathes, coughing, and/or laying down which is typically normal following your procedure. If this occurs, you can take ibuprofen (Motrin) 800 mg every 8 hours for 2-3 days. If the chest pain is persistent or severe please visit the nearest emergency department.    Groin site management, precautions, and restrictions:  Remove the bandages over your groin area the morning after your procedure. You can shower after you remove these bandages. Keep the groin sites clean and dry. You do not need to apply ointments or bandages to the area.   If oozing from groin site occurs, apply pressure without letting up for 15 minutes and lay flat for 1 hour. If bleeding has resolved, you can continue to monitor. If the bleeding continues or there is significant swelling or pain in the groin area, please visit the nearest ER for evaluation and treatment. DO NOT STOP TAKING YOUR BLOOD THINNER UNLESS INSTRUCTED BY A PHYSICIAN.   Do not take baths or submerge your groin area or at least 1 week or when the puncture sites in your groin have completely healed  Do not lift anything over 10 lbs for the first week after your procedure, and avoid strenuous activity during this time to allow for the groin sites to heal. After 1 week, there are no activity restrictions.  Please contact the electrophysiology clinic or go to the ER if you experience: severe chest pain, shortness of breath, bleeding or swelling of the groin sites, or any other concerns.     Diet: Cardiac diet    Activity: Ad toya, wound care " instructions provided    Disposition: Home or Self Care    Follow Up:   Follow-up Information       Dung Gonsalez MD Follow up in 3 month(s).    Specialties: Electrophysiology, Cardiology  Contact information:  [address]  [facility] [address] [phone]                             Discharge Medications:      Medication List        CONTINUE taking these medications      albuterol 2.5 mg /3 mL (0.083 %) nebulizer solution  Commonly known as: PROVENTIL  Take 3 mLs (2.5 mg total) by nebulization every 6 (six) hours as needed for Wheezing. Rescue     apixaban 5 mg Tab  Commonly known as: ELIQUIS  Take 1 tablet (5 mg total) by mouth 2 (two) times daily.     ascorbic acid (vitamin C) 500 MG tablet  Commonly known as: VITAMIN C     butalbital-acetaminophen-caffeine -40 mg -40 mg per tablet  Commonly known as: FIORICET, ESGIC     cholecalciferol (vitamin D3) 50 mcg (2,000 unit) Tbdl     DRY EYES OPHT     furosemide 20 MG tablet  Commonly known as: LASIX  Take 1 tablet (20 mg total) by mouth every other day.     ipratropium 21 mcg (0.03 %) nasal spray  Commonly known as: ATROVENT  2 sprays by Each Nostril route 3 (three) times daily.     ketoconazole 2 % shampoo  Commonly known as: NIZORAL     levocetirizine 5 MG tablet  Commonly known as: XYZAL  TAKE 1 TABLET (5 MG TOTAL) BY MOUTH EVERY EVENING.     meloxicam 7.5 MG tablet  Commonly known as: MOBIC  Take 1 tablet (7.5 mg total) by mouth once daily.     metoprolol succinate 50 MG 24 hr tablet  Commonly known as: TOPROL-XL  Take 1.5 tablets (75 mg total) by mouth once daily.     nitroGLYCERIN 0.4 MG SL tablet  Commonly known as: NITROSTAT  Place 1 tablet (0.4 mg total) under the tongue every 5 (five) minutes as needed.     olmesartan 20 MG tablet  Commonly known as: BENICAR  Take 1 tablet (20 mg total) by mouth 2 (two) times a day.     ondansetron 4 MG tablet  Commonly known as: ZOFRAN  Take 1 tablet (4 mg total) by mouth every 8 (eight) hours as  needed for Nausea.     spironolactone 25 MG tablet  Commonly known as: ALDACTONE  Take 0.5 tablets (12.5 mg total) by mouth once daily.     valACYclovir 1000 MG tablet  Commonly known as: VALTREX  Take 1 tablet (1,000 mg total) by mouth 3 (three) times daily. for 7 days              Raul Fernandes MD  Cardiovascular Disease PGY-V  [facility]

## 2024-06-25 NOTE — ANESTHESIA POSTPROCEDURE EVALUATION
Anesthesia Post Evaluation    Patient: Tayler Dominguez    Procedure(s) Performed: Procedure(s) (LRB):  Ablation, Atrial Flutter, Typical (N/A)  Transesophageal echo (PAULIE) intra-procedure log documentation (N/A)    Final Anesthesia Type: general      Patient location during evaluation: PACU  Patient participation: Yes- Able to Participate  Level of consciousness: awake and alert and oriented  Post-procedure vital signs: reviewed and stable  Pain management: adequate  Airway patency: patent    PONV status at discharge: No PONV  Anesthetic complications: no      Cardiovascular status: blood pressure returned to baseline, hemodynamically stable and stable  Respiratory status: unassisted, room air and spontaneous ventilation  Hydration status: euvolemic  Follow-up not needed.              Vitals Value Taken Time   /81 06/25/24 1732   Temp 36.4 °C (97.5 °F) 06/25/24 1635   Pulse 85 06/25/24 1743   Resp 17 06/25/24 1743   SpO2 95 % 06/25/24 1743   Vitals shown include unfiled device data.      No case tracking events are documented in the log.      Pain/Brandie Score: Brandie Score: 10 (6/25/2024  5:15 PM)

## 2024-06-25 NOTE — CONSULTS
[facility], [place]  PAULIE Consult      Tayler Dominguez  YOB: 1945  Medical Record Number:  [id number]  Attending Physician:  Dung Gonsalez MD   Current Principal Problem:  <principal problem not specified>    History       78 y.o. female with a PMHx significant for persistent CTI dependent AFL([id number]-[id number] 5), PE 1/2024, HTN, and HFpEF. Anticoagulated with Eliquis. EF normal in Jan 2024. No definitive AF.     Recently evaluated for and completed treatment for shingles.     Tayler Dominguez is here today for a RF-CTI for treatment of typical atrial flutter     Previous DCCV and/or procedural history:  Reported ablation 20yrs ago for SVT?     Today's ECG: typical atrial flutter  Anticoagulation: apixiban (Last dose 6/24)  TTE: EF 55-60%  Hgb: 14.7  Cr: 0.79    Dysphagia or odynophagia:  No  Liver Disease, esophageal disease, or known varices:  No  Upper GI Bleeding: No  Snoring:  No  Sleep Apnea:  No  Prior neck surgery or radiation:  No  History of anesthetic difficulties:  No  Family history of anesthetic difficulties:  No  Last oral intake: yesterday before midnight  Able to move neck in all directions:  Yes    Medications - Outpatient  Prior to Admission medications    Medication Sig Start Date End Date Taking? Authorizing Provider   apixaban (ELIQUIS) 5 mg Tab Take 1 tablet (5 mg total) by mouth 2 (two) times daily. 3/28/24  Yes Anjelica James MD   ascorbic acid, vitamin C, (VITAMIN C) 500 MG tablet Take 500 mg by mouth daily as needed (during winter months).   Yes Provider, Historical   cholecalciferol, vitamin D3, 50 mcg (2,000 unit) TbDL Take 2,000 Units by mouth every evening.   Yes Provider, Historical   furosemide (LASIX) 20 MG tablet Take 1 tablet (20 mg total) by mouth every other day. 2/15/24 2/14/25 Yes Anjelica James MD   levocetirizine (XYZAL) 5 MG tablet TAKE 1 TABLET (5 MG TOTAL) BY MOUTH EVERY EVENING. 7/26/23 7/25/24 Yes Kylee Mcgill, DO   metoprolol succinate  (TOPROL-XL) 50 MG 24 hr tablet Take 1.5 tablets (75 mg total) by mouth once daily. 5/24/24 6/23/24 Yes Ki Mixon MD   olmesartan (BENICAR) 20 MG tablet Take 1 tablet (20 mg total) by mouth 2 (two) times a day. 4/19/24 6/25/24 Yes Jhonny Arenas MD   spironolactone (ALDACTONE) 25 MG tablet Take 0.5 tablets (12.5 mg total) by mouth once daily. 6/6/24  Yes Anjelica James MD   albuterol (PROVENTIL) 2.5 mg /3 mL (0.083 %) nebulizer solution Take 3 mLs (2.5 mg total) by nebulization every 6 (six) hours as needed for Wheezing. Rescue 1/15/24 1/14/25  Jhonny Arenas MD   butalbital-acetaminophen-caffeine -40 mg (FIORICET, ESGIC) -40 mg per tablet Take 1 tablet by mouth every 6 (six) hours as needed for Headaches. 1/27/23   Provider, Historical   ipratropium (ATROVENT) 21 mcg (0.03 %) nasal spray 2 sprays by Each Nostril route 3 (three) times daily. 2/7/24   Jhonny Arenas MD   ketoconazole (NIZORAL) 2 % shampoo Apply 1 application  topically 3 (three) times a week. 1/24/19   Julita Perez MD   meloxicam (MOBIC) 7.5 MG tablet Take 1 tablet (7.5 mg total) by mouth once daily. 11/12/21   Marciano Garcia MD   nitroGLYCERIN (NITROSTAT) 0.4 MG SL tablet Place 1 tablet (0.4 mg total) under the tongue every 5 (five) minutes as needed. 8/13/20   Anjelica James MD   ondansetron (ZOFRAN) 4 MG tablet Take 1 tablet (4 mg total) by mouth every 8 (eight) hours as needed for Nausea. 11/12/21   Marciano Garcia MD   petrolat,wht/min oil/sod chl (DRY EYES OPHT) Place 1 drop into both eyes daily as needed (dry eyes).    Provider, Historical   valACYclovir (VALTREX) 1000 MG tablet Take 1 tablet (1,000 mg total) by mouth 3 (three) times daily. for 7 days 5/22/24 6/5/24  Jhonny Arenas MD       Medications - Current  Scheduled Meds:  Continuous Infusions:    Allergies  Review of patient's allergies indicates:   Allergen Reactions    Ambien [zolpidem] Rash    Cephalosporins Rash    Cleocin [clindamycin  hcl] Rash    Codeine Rash    Erythropoietin analogues Rash    Influenza vaccine tri-sp 09-10 Rash    Levaquin [levofloxacin] Rash    Macrobid [nitrofurantoin monohyd/m-cryst] Rash    Morphine Rash    Pcn [penicillins] Rash    Sudal [phenylephrine-pot guaiacolsulf] Rash    Sulfa (sulfonamide antibiotics) Rash     Past Medical History  Past Medical History:   Diagnosis Date    Allergy     Arthritis     hands    Breast disorder     Crescendo angina     Edema     Heart murmur     MVP    History of 2019 novel coronavirus disease (COVID-19) 01/26/2021    Hypertension     Kidney stones     Menopause     Migraine headache     Mitral regurgitation     Near syncope     Sinusitis     SOB (shortness of breath)     Supraventricular tachycardia      Past Surgical History  Past Surgical History:   Procedure Laterality Date    ABDOMINAL SURGERY      APPENDECTOMY      BREAST BIOPSY      CARDIAC CATHETERIZATION  2013    CATARACT EXTRACTION W/  INTRAOCULAR LENS IMPLANT Bilateral     COLONOSCOPY  11/15/2013    Done by Dr. ELTON Lawrence -- report in paper chart    CORONARY ANGIOGRAPHY N/A 08/18/2020    Procedure: ANGIOGRAM, CORONARY ARTERY;  Surgeon: Anjelica aJmes MD;  Location: [facility] CATH;  Service: Cardiology;  Laterality: N/A;    HYSTERECTOMY      PARTIAL @ 29 YO, COMPLETE @ 32 YO    LEFT HEART CATHETERIZATION N/A 08/18/2020    Procedure: Left heart cath;  Surgeon: Anjelica James MD;  [facility];  Service: Cardiology;  Laterality: N/A;    RADIOFREQUENCY ABLATION  2013    at [facility] heart      ROS  10 point ROS performed and negative except as stated in HPI     Physical Examination   Vital Signs  Vitals  Temp: 97.8 °F (36.6 °C)  Temp Source: Temporal  Pulse: 97  Heart Rate Source: SpO2  Resp: 18  SpO2: 95 %  BP: (!) 171/81  BP Location: Left arm  BP Method: Automatic  Patient Position: Lying    24 Hour VS Range  Temp:  [97.8 °F (36.6 °C)]   Pulse:  [97]   Resp:  [18]   BP: (171-177)/(81-93)   SpO2:  [95 %]   No intake or output data  "in the 24 hours ending 06/25/24 9118      Physical Exam  HENT:      Head: Normocephalic and atraumatic.   Eyes:      Conjunctiva/sclera: Conjunctivae normal.   Cardiovascular:      Rate and Rhythm: Normal rate. Rhythm irregular.      Heart sounds: Normal heart sounds.   Pulmonary:      Effort: Pulmonary effort is normal. No respiratory distress.      Breath sounds: Normal breath sounds. No wheezing.   Abdominal:      General: There is no distension.      Palpations: Abdomen is soft.      Tenderness: There is no abdominal tenderness.   Musculoskeletal:      Cervical back: Normal range of motion.   Neurological:      Mental Status: She is alert and oriented to person, place, and time.   Psychiatric:         Mood and Affect: Affect normal.         Data   No results for input(s): "WBC", "HGB", "HCT", "PLT" in the last 168 hours.   No results for input(s): "PROTIME", "INR" in the last 168 hours.   No results for input(s): "NA", "K", "CL", "CO2", "BUN", "CREATININE", "ANIONGAP", "CALCIUM" in the last 168 hours.   Assessment & Plan     PAULIE for ZEB assessment prior to ablation  -No absolute contraindications of esophageal stricture, tumor, perforation, laceration,or diverticulum and/or active GI bleed  -The risks, benefits & alternatives of the procedure were explained to the patient.   -The risks of transesophageal echo include but are not limited to:  Dental trauma, esophageal trauma/perforation, bleeding, laryngospasm/brochospasm, aspiration, sore throat/hoarseness, & dislodgement of the endotracheal tube/nasogastric tube (where applicable).    -The risks of ANES monitored sedation include hypotension, respiratory depression, arrhythmias, bronchospasm, & death.    -Informed consent was obtained. The patient is agreeable to proceed with the procedure and all questions and concerns addressed.    Case discussed with an attending in echocardiography lab.    Wily Farmeror, MD  [facility]   Cardiovascular Disease " PGY-V

## 2024-06-25 NOTE — Clinical Note
A dressing was applied to the incision. Gauze and tegaderm dressing applied after hemostasis achieved

## 2024-06-26 ENCOUNTER — DOCUMENTATION ONLY (OUTPATIENT)
Dept: CARDIOLOGY | Facility: CLINIC | Age: 79
End: 2024-06-26
Payer: MEDICARE

## 2024-06-26 VITALS
BODY MASS INDEX: 25.71 KG/M2 | HEIGHT: 66 IN | RESPIRATION RATE: 16 BRPM | DIASTOLIC BLOOD PRESSURE: 72 MMHG | TEMPERATURE: 98 F | HEART RATE: 87 BPM | SYSTOLIC BLOOD PRESSURE: 139 MMHG | OXYGEN SATURATION: 94 % | WEIGHT: 160 LBS

## 2024-06-26 PROBLEM — I48.3 TYPICAL ATRIAL FLUTTER: Status: ACTIVE | Noted: 2024-01-30

## 2024-06-26 LAB
OHS QRS DURATION: 84 MS
OHS QTC CALCULATION: 446 MS

## 2024-06-26 PROCEDURE — 25000003 PHARM REV CODE 250: Performed by: STUDENT IN AN ORGANIZED HEALTH CARE EDUCATION/TRAINING PROGRAM

## 2024-06-26 RX ORDER — LOSARTAN POTASSIUM 50 MG/1
50 TABLET ORAL DAILY
Status: DISCONTINUED | OUTPATIENT
Start: 2024-06-26 | End: 2024-06-26 | Stop reason: HOSPADM

## 2024-06-26 RX ADMIN — APIXABAN 5 MG: 5 TABLET, FILM COATED ORAL at 09:06

## 2024-06-26 RX ADMIN — LOSARTAN POTASSIUM 50 MG: 50 TABLET, FILM COATED ORAL at 05:06

## 2024-06-26 RX ADMIN — ACETAMINOPHEN 650 MG: 325 TABLET ORAL at 05:06

## 2024-06-26 NOTE — PROGRESS NOTES
Dr. Hector at bedside. R groin site injected with lidocaine/epinephrine per MD. Pressure held for five minutes. New gauze and tegaderm drsg applied. Pt to lay flat until 2030.

## 2024-06-26 NOTE — PLAN OF CARE
VSS on RA, BP WNL, NSR 80-90s. Pt ambulated to bathroom w SBA. Tylenol given for positional back pain with relief    Problem: Adult Inpatient Plan of Care  Goal: Absence of Hospital-Acquired Illness or Injury  Outcome: Progressing  Goal: Optimal Comfort and Wellbeing  Outcome: Progressing     Problem: Wound  Goal: Optimal Coping  Outcome: Progressing     Problem: Fall Injury Risk  Goal: Absence of Fall and Fall-Related Injury  Outcome: Progressing

## 2024-06-26 NOTE — NURSING TRANSFER
Nursing Transfer Note      6/25/2024   7:56 PM    Nurse giving handoff:TIP Ahmadi  Nurse receiving handoff:CSU RN    Reason patient is being transferred: post anesthesia    Transfer To: 307    Transfer via stretcher    Transfer with cardiac monitoring    Transported by RN    Transfer Vital Signs:  Blood Pressure:157/75  Heart Rate:91  O2:96%-- RA  Temperature:97.5  Respirations:15    Telemetry: Box Number [id number]  Order for Tele Monitor? Yes    4eyes on Skin: yes    Medicines sent: n/a    Any special needs or follow-up needed: Sit up @ 2020, Walk @ 2120    Patient belongings transferred with patient: Yes    Chart send with patient: Yes    Notified: spouse, daughter    Patient reassessed at: 6/25/24@ 2000     Upon arrival to floor: cardiac monitor applied, patient oriented to room, call bell in reach, and bed in lowest position

## 2024-06-26 NOTE — PROGRESS NOTES
Heart Failure Transitional [facility] (HFT[facility]) Team notified of pt referral via Ambulatory Referral to Heart Failure Transitional Care ([id number]).    Patient screened today 06/26/2024 by provider and RN for enrollment to program.      Pt was deemed not a candidate for enrollment at this time related to patient current admission diagnosis/ problem will not benefit from the Heart Failure Transitional Care Program.  Scheduled ablation  Pt will require additional follow up planning per primary team.     If pt status, diagnosis, or treatment plan changes , please place AMB referral to Heart Failure Transitional Care Clinic ([id number]) for HFTCC enrollment re-evalution.

## 2024-06-26 NOTE — PROGRESS NOTES
Dr. Hector, cardiology, notified of pt status. BP= 153/71 with medium amount of sang drainge to R groin site. No swelling or hematoma noted to site. Area marked.Pressure held for 5 minutes with no additional bleeding noted. MD will come and inject site with epinephrine and lidocaine at first availability. No drsg changes until site injected. Will monitor.

## 2024-06-26 NOTE — NURSING
Patient being discharged home, AVS given with all questions/concerns answered, IV/heart monitor removed, no new medications added, patient waiting for transport, will cont to [facility] for now.     0950: Patients wheelchair has arrived

## 2024-07-03 ENCOUNTER — TELEPHONE (OUTPATIENT)
Dept: OBSTETRICS AND GYNECOLOGY | Facility: CLINIC | Age: 79
End: 2024-07-03
Payer: MEDICARE

## 2024-07-03 DIAGNOSIS — Z12.31 SCREENING MAMMOGRAM FOR BREAST CANCER: Primary | ICD-10-CM

## 2024-07-03 NOTE — TELEPHONE ENCOUNTER
----- Message from Jorge Luis Nina sent at 7/3/2024  2:08 PM CDT -----  Contact: Self  Type: Needs Medical Advice  Who Called:  Patient    Best Call Back Number: [phone]   Additional Information:  Called to have another mammo order mailed to her   [address] [facility] [address]

## 2024-07-23 RX ORDER — FUROSEMIDE 20 MG/1
20 TABLET ORAL EVERY OTHER DAY
Qty: 45 TABLET | Refills: 0 | Status: SHIPPED | OUTPATIENT
Start: 2024-07-23

## 2024-09-24 PROBLEM — I48.0 PAF (PAROXYSMAL ATRIAL FIBRILLATION): Status: ACTIVE | Noted: 2024-09-24

## 2024-09-24 NOTE — PROGRESS NOTES
Ms. Dominguez is a patient of Dr. Gonsalez and was last seen in clinic 5/10/2024.      Subjective:   Patient ID:  Tayler Dominguez is a 78 y.o. female who presents for follow up of Atrial Flutter  .     HPI:    Ms. Dominguez is a 78 y.o. female with AFL (RFA of CTI 6/2024), AF, PE (1/2024), HFpEF here for follow up after AFL ablation.    Background:    CTI dependent AFL  AF  Pulmonary embolism 1/2024  HFpEF     Background:  1/31/24 admitted with acute bilateral PE > started on treatment dose Eliquis. Noted have newly diagnosed CTI dependent atrial flutter with periods reported of atrial fibrillation.  72 Hr Holter: 100% AFL with periods questionable for course AF. Mean HR 94 (min 44; max 160).   TTE 1/2024: LVEF 55-60%. Normal RV. PASP 43 with CVP 15.   Takes Eliquis 5 mg BID and Toprol XL 25 mg daily     5/10/2024:  Reports palpitations with AFL  Tolerating OAC  Reports ablation 20 years prior at [facility] for possible SVT?  ECG/telemetry strip today which shows AFL    Update (09/27/2024):    6/25/2024: RFA of CTI    Today she says she feels well. One episode of palpitations lasting <1min since procedure. No chest pain with exertion or at rest, SOB, DAVIS, dizziness, or syncope reported.    She is currently taking eliquis 5mg BID for stroke prophylaxis and denies significant bleeding episodes. She is currently being treated with toprol 100mg daily for HR control.  Kidney function is stable, with a creatinine of 0.9 on 7/18/2024.    I have personally reviewed the patient's EKG today, which shows sinus rhythm with 1st deg AVB at 77bpm. VA interval is 214. QRS is 82. QTc is 445.    Relevant Cardiac Test Results:    PAULIE (6/25/2024):    PAULIE prior to ablation. After use of echo contrast, no ZEB/LA visualized.    Left Ventricle: The left ventricle is normal in size. Normal wall thickness. There is low normal systolic function with a visually estimated ejection fraction of 50 - 55%. Ejection fraction by visual approximation is 50%.    Right  Ventricle: Normal right ventricular cavity size. Wall thickness is normal. Systolic function is normal.    Left Atrium: Left atrium is dilated. The left atrial appendage appears normal. The left atrial appendage has a windsock morphology. Appendage velocity is normal at greater than 40 cm/sec. After use of echo contrast, no ZEB/LA visualized. There is no thrombus in the cavity.    Right Atrium: Right atrium is dilated.    Aortic Valve: The aortic valve is structurally normal.    Mitral Valve: The mitral valve is structurally normal. There is mild regurgitation.    Tricuspid Valve: The tricuspid valve is structurally normal. There is mild regurgitation.    Aorta: Grade 2 atherosclerosis of the descending aorta and in the aortic arch with mild thickening.    Pericardium: There is no pericardial effusion.    Current Outpatient Medications   Medication Sig    albuterol (PROVENTIL) 2.5 mg /3 mL (0.083 %) nebulizer solution Take 3 mLs (2.5 mg total) by nebulization every 6 (six) hours as needed for Wheezing. Rescue    apixaban (ELIQUIS) 5 mg Tab Take 1 tablet (5 mg total) by mouth 2 (two) times daily.    ascorbic acid, vitamin C, (VITAMIN C) 500 MG tablet Take 500 mg by mouth daily as needed (during winter months).    azelastine (ASTELIN) 137 mcg (0.1 %) nasal spray 1 spray (137 mcg total) by Nasal route 2 (two) times daily.    butalbital-acetaminophen-caffeine -40 mg (FIORICET, ESGIC) -40 mg per tablet Take 1 tablet by mouth every 6 (six) hours as needed for Headaches.    cholecalciferol, vitamin D3, 50 mcg (2,000 unit) TbDL Take 2,000 Units by mouth every evening.    furosemide (LASIX) 20 MG tablet take 1 tablet by mouth every other day.    ipratropium (ATROVENT) 21 mcg (0.03 %) nasal spray 2 sprays by Each Nostril route 3 (three) times daily.    ketoconazole (NIZORAL) 2 % shampoo Apply 1 application  topically 3 (three) times a week.    levocetirizine (XYZAL) 5 MG tablet TAKE 1 TABLET (5 MG TOTAL) BY MOUTH  "EVERY EVENING.    meloxicam (MOBIC) 7.5 MG tablet Take 1 tablet (7.5 mg total) by mouth once daily.    metoprolol succinate (TOPROL-XL) 100 MG 24 hr tablet Take 1 tablet (100 mg total) by mouth once daily.    nitroGLYCERIN (NITROSTAT) 0.4 MG SL tablet Place 1 tablet (0.4 mg total) under the tongue every 5 (five) minutes as needed.    olmesartan (BENICAR) 20 MG tablet Take 1 tablet (20 mg total) by mouth 2 (two) times a day.    ondansetron (ZOFRAN) 4 MG tablet Take 1 tablet (4 mg total) by mouth every 8 (eight) hours as needed for Nausea.    petrolat,wht/min oil/sod chl (DRY EYES OPHT) Place 1 drop into both eyes daily as needed (dry eyes).    spironolactone (ALDACTONE) 25 MG tablet Take 1 tablet (25 mg total) by mouth nightly.    valACYclovir (VALTREX) 1000 MG tablet Take 1 tablet (1,000 mg total) by mouth 3 (three) times daily. for 7 days     No current facility-administered medications for this visit.       Review of Systems   Constitutional: Negative for malaise/fatigue.   Cardiovascular:  Positive for palpitations (brief). Negative for chest pain, dyspnea on exertion and leg swelling.   Respiratory:  Negative for shortness of breath.    Hematologic/Lymphatic: Negative for bleeding problem.   Skin:  Negative for rash.   Musculoskeletal:  Negative for myalgias.   Gastrointestinal:  Negative for hematemesis, hematochezia and nausea.   Genitourinary:  Negative for hematuria.   Neurological:  Negative for light-headedness.   Psychiatric/Behavioral:  Negative for altered mental status.    Allergic/Immunologic: Negative for persistent infections.       Objective:          BP (!) 140/81   Pulse 77   Ht 5' 6" (1.676 m)   Wt 75.9 kg (167 lb 5.3 oz)   BMI 27.01 kg/m²     Physical Exam  Vitals and nursing note reviewed.   Constitutional:       Appearance: Normal appearance. She is well-developed.   HENT:      Head: Normocephalic.      Nose: Nose normal.   Eyes:      Pupils: Pupils are equal, round, and reactive to " light.   Cardiovascular:      Rate and Rhythm: Normal rate and regular rhythm.   Pulmonary:      Effort: No respiratory distress.   Musculoskeletal:         General: Normal range of motion.   Skin:     General: Skin is warm and dry.      Findings: No erythema.   Neurological:      Mental Status: She is alert and oriented to person, place, and time.   Psychiatric:         Speech: Speech normal.         Behavior: Behavior normal.           Lab Results   Component Value Date     07/18/2024    K 4.2 07/18/2024    MG 2.0 07/18/2024    BUN 34 (H) 07/18/2024    CREATININE 0.94 07/18/2024    ALT 40 (H) 05/24/2024    AST 57 (H) 05/24/2024    AST 37 (H) 04/18/2016    HGB 14.7 05/24/2024    HCT 47.1 05/24/2024    TSH 2.230 01/29/2024    LDLCALC 82.6 01/30/2024             Assessment:     1. Typical atrial flutter    2. Chronic diastolic heart failure    3. Essential hypertension    4. Long term (current) use of anticoagulants    5. PAF (paroxysmal atrial fibrillation)        Plan:     In summary, Ms. Dominguez is a 78 y.o. female with AFL (RFA of CTI 6/2024), AF, PE (1/2024), HFpEF here for follow up after AFL ablation.  Pt is 3 mo s/p RFA of CTI. She is doing well from a rhythm standpoint, with only one episode of brief palps since procedure.  Chart review indicates pt has hx of AF as well as AFL. CHADSVASc 4 and recommend continuing eliquis for CVA prophylaxis. She is doing very well.    Continue current meds  RTC 6 mo, sooner if needed    *A copy of this note has been sent to Dr. Gonsalez*    Follow up in about 6 months (around 3/27/2025).    ------------------------------------------------------------------    Gabriela Delarosa, JIMMY, NP-C  Cardiac Electrophysiology

## 2024-09-27 ENCOUNTER — OFFICE VISIT (OUTPATIENT)
Dept: ELECTROPHYSIOLOGY | Facility: CLINIC | Age: 79
End: 2024-09-27
Payer: MEDICARE

## 2024-09-27 ENCOUNTER — HOSPITAL ENCOUNTER (OUTPATIENT)
Dept: CARDIOLOGY | Facility: CLINIC | Age: 79
Discharge: HOME | End: 2024-09-27
Payer: MEDICARE

## 2024-09-27 VITALS
HEIGHT: 66 IN | DIASTOLIC BLOOD PRESSURE: 81 MMHG | WEIGHT: 167.31 LBS | SYSTOLIC BLOOD PRESSURE: 140 MMHG | HEART RATE: 77 BPM | BODY MASS INDEX: 26.89 KG/M2

## 2024-09-27 DIAGNOSIS — Z79.01 LONG TERM (CURRENT) USE OF ANTICOAGULANTS: ICD-10-CM

## 2024-09-27 DIAGNOSIS — I48.0 PAF (PAROXYSMAL ATRIAL FIBRILLATION): ICD-10-CM

## 2024-09-27 DIAGNOSIS — I50.32 CHRONIC DIASTOLIC HEART FAILURE: ICD-10-CM

## 2024-09-27 DIAGNOSIS — I10 ESSENTIAL HYPERTENSION: ICD-10-CM

## 2024-09-27 DIAGNOSIS — I48.92 ATRIAL FLUTTER, UNSPECIFIED TYPE: ICD-10-CM

## 2024-09-27 DIAGNOSIS — I48.3 TYPICAL ATRIAL FLUTTER: Primary | ICD-10-CM

## 2024-09-27 DIAGNOSIS — I50.9 CONGESTIVE HEART FAILURE, UNSPECIFIED HF CHRONICITY, UNSPECIFIED HEART FAILURE TYPE: ICD-10-CM

## 2024-09-27 LAB
OHS QRS DURATION: 82 MS
OHS QTC CALCULATION: 445 MS

## 2024-09-27 PROCEDURE — 93005 ELECTROCARDIOGRAM TRACING: CPT | Mod: S$GLB,,, | Performed by: INTERNAL MEDICINE

## 2024-09-27 PROCEDURE — 93010 ELECTROCARDIOGRAM REPORT: CPT | Mod: S$GLB,,, | Performed by: INTERNAL MEDICINE

## 2024-09-27 PROCEDURE — 99999 PR PBB SHADOW E&M-EST. PATIENT-LVL IV: CPT | Mod: PBBFAC,,, | Performed by: NURSE PRACTITIONER

## 2024-10-08 ENCOUNTER — OFFICE VISIT (OUTPATIENT)
Dept: CARDIOLOGY | Facility: CLINIC | Age: 79
End: 2024-10-08
Payer: MEDICARE

## 2024-10-08 VITALS
WEIGHT: 167.13 LBS | DIASTOLIC BLOOD PRESSURE: 78 MMHG | HEART RATE: 80 BPM | HEIGHT: 67 IN | SYSTOLIC BLOOD PRESSURE: 134 MMHG | BODY MASS INDEX: 26.23 KG/M2

## 2024-10-08 DIAGNOSIS — Z79.01 LONG TERM (CURRENT) USE OF ANTICOAGULANTS: Chronic | ICD-10-CM

## 2024-10-08 DIAGNOSIS — I34.0 NON-RHEUMATIC MITRAL REGURGITATION: Chronic | ICD-10-CM

## 2024-10-08 DIAGNOSIS — R94.30 ELEVATED LEFT VENTRICULAR END-DIASTOLIC PRESSURE (LVEDP): ICD-10-CM

## 2024-10-08 DIAGNOSIS — I65.23 CAROTID ARTERY PLAQUE, BILATERAL: Chronic | ICD-10-CM

## 2024-10-08 DIAGNOSIS — I50.32 CHRONIC DIASTOLIC HEART FAILURE: Primary | Chronic | ICD-10-CM

## 2024-10-08 DIAGNOSIS — I48.0 PAF (PAROXYSMAL ATRIAL FIBRILLATION): Chronic | ICD-10-CM

## 2024-10-08 DIAGNOSIS — I10 ESSENTIAL HYPERTENSION: Chronic | ICD-10-CM

## 2024-10-08 PROCEDURE — 3288F FALL RISK ASSESSMENT DOCD: CPT | Mod: CPTII,S$GLB,, | Performed by: INTERNAL MEDICINE

## 2024-10-08 PROCEDURE — 99214 OFFICE O/P EST MOD 30 MIN: CPT | Mod: S$GLB,,, | Performed by: INTERNAL MEDICINE

## 2024-10-08 PROCEDURE — 99999 PR PBB SHADOW E&M-EST. PATIENT-LVL IV: CPT | Mod: PBBFAC,,, | Performed by: INTERNAL MEDICINE

## 2024-10-08 PROCEDURE — 1126F AMNT PAIN NOTED NONE PRSNT: CPT | Mod: CPTII,S$GLB,, | Performed by: INTERNAL MEDICINE

## 2024-10-08 PROCEDURE — 1101F PT FALLS ASSESS-DOCD LE1/YR: CPT | Mod: CPTII,S$GLB,, | Performed by: INTERNAL MEDICINE

## 2024-10-08 PROCEDURE — 3075F SYST BP GE 130 - 139MM HG: CPT | Mod: CPTII,S$GLB,, | Performed by: INTERNAL MEDICINE

## 2024-10-08 PROCEDURE — 3078F DIAST BP <80 MM HG: CPT | Mod: CPTII,S$GLB,, | Performed by: INTERNAL MEDICINE

## 2024-10-08 PROCEDURE — 1159F MED LIST DOCD IN RCRD: CPT | Mod: CPTII,S$GLB,, | Performed by: INTERNAL MEDICINE

## 2024-10-08 RX ORDER — FUROSEMIDE 20 MG/1
20 TABLET ORAL EVERY OTHER DAY
Qty: 45 TABLET | Refills: 1 | Status: SHIPPED | OUTPATIENT
Start: 2024-10-08

## 2024-10-08 NOTE — PROGRESS NOTES
Subjective:    Patient ID:  Tayler Dominguez is a 78 y.o. female who presents for Follow-up and Heart Problem        HPI  RECENT LABS BMP OK, MAGNESIUM 2.0, HAD ABLATION FOR A FLUTTER, ONE EPISODE SINCE THEN, BP OK, MILD DISTANT EXERTION NO CHEST PAIN NO TIA TYPE SYMPTOMS NO NEAR-SYNCOPE, SEE ROS    Past Medical History:   Diagnosis Date    Allergy     Arthritis     hands    Breast disorder     Crescendo angina     Edema     Heart murmur     MVP    History of 2019 novel coronavirus disease (COVID-19) 01/26/2021    Hypertension     Kidney stones     Menopause     Migraine headache     Mitral regurgitation     Near syncope     Sinusitis     SOB (shortness of breath)     Supraventricular tachycardia      Past Surgical History:   Procedure Laterality Date    ABDOMINAL SURGERY      ABLATION, ATRIAL FLUTTER, TYPICAL N/A 6/25/2024    Procedure: Ablation, Atrial Flutter, Typical;  Surgeon: Dung Gonsalez MD;  Location: [facility] EP LAB;  Service: Cardiology;  Laterality: N/A;  AFL, PAULIE, CTI, RFA, CLARY, MAC, SK, 3 Prep    APPENDECTOMY      BREAST BIOPSY      CARDIAC CATHETERIZATION  2013    CATARACT EXTRACTION W/  INTRAOCULAR LENS IMPLANT Bilateral     COLONOSCOPY  11/15/2013    Done by Dr. ELTON Lawrence -- report in paper chart    CORONARY ANGIOGRAPHY N/A 08/18/2020    Procedure: ANGIOGRAM, CORONARY ARTERY;  Surgeon: Anjelica James MD;  Location: [facility] CATH;  Service: Cardiology;  Laterality: N/A;    ECHOCARDIOGRAM,TRANSESOPHAGEAL N/A 6/25/2024    Procedure: Transesophageal echo (PAULIE) intra-procedure log documentation;  Surgeon: Cori Ayers MD;  Location: [facility] EP LAB;  Service: Cardiology;  Laterality: N/A;    HYSTERECTOMY      PARTIAL @ 27 YO, COMPLETE @ 32 YO    LEFT HEART CATHETERIZATION N/A 08/18/2020    Procedure: Left heart cath;  Surgeon: Anjelica James MD;  Location: [facility] CATH;  Service: Cardiology;  Laterality: N/A;    RADIOFREQUENCY ABLATION  2013    at [facility] heart      Family History   Problem Relation Name Age of  Onset    Cancer Mother      Aneurysm Mother      Hypertension Mother      Heart disease Father      Hyperlipidemia Father      Hypertension Father      Diabetes Father      Heart disease Sister      Heart disease Sister      Heart disease Brother      Kidney disease Brother      Heart disease Brother      Cancer Brother       Social History     Socioeconomic History    Marital status:    Tobacco Use    Smoking status: Never     Passive exposure: Never    Smokeless tobacco: Never   Substance and Sexual Activity    Alcohol use: No    Drug use: No     Social Drivers of Health     Financial Resource Strain: Low Risk  (1/29/2024)    Overall Financial Resource Strain (CARDIA)     Difficulty of Paying Living Expenses: Not hard at all   Food Insecurity: No Food Insecurity (1/30/2024)    Hunger Vital Sign     Worried About Running Out of Food in the Last Year: Never true     Ran Out of Food in the Last Year: Never true   Transportation Needs: No Transportation Needs (1/30/2024)    PRAPARE - Transportation     Lack of Transportation (Medical): No     Lack of Transportation (Non-Medical): No   Physical Activity: Inactive (1/29/2024)    Exercise Vital Sign     Days of Exercise per Week: 0 days     Minutes of Exercise per Session: 0 min   Stress: No Stress Concern Present (1/29/2024)    Citizen of [place] of Occupational Health - Occupational Stress Questionnaire     Feeling of Stress : Only a little   Housing Stability: Low Risk  (1/30/2024)    Housing Stability Vital Sign     Unable to Pay for Housing in the Last Year: No     Number of Places Lived in the Last Year: 1     Unstable Housing in the Last Year: No       Review of patient's allergies indicates:   Allergen Reactions    Ambien [zolpidem] Rash    Cephalosporins Rash    Cleocin [clindamycin hcl] Rash    Codeine Rash    Erythropoietin analogues Rash    Influenza vaccine tri-sp 09-10 Rash    Levaquin [levofloxacin] Rash    Macrobid [nitrofurantoin monohyd/m-cryst]  Rash    Morphine Rash    Pcn [penicillins] Rash    Sudal [phenylephrine-pot guaiacolsulf] Rash    Sulfa (sulfonamide antibiotics) Rash       Current Outpatient Medications:     albuterol (PROVENTIL) 2.5 mg /3 mL (0.083 %) nebulizer solution, Take 3 mLs (2.5 mg total) by nebulization every 6 (six) hours as needed for Wheezing. Rescue, Disp: 30 mL, Rfl: 0    ascorbic acid, vitamin C, (VITAMIN C) 500 MG tablet, Take 500 mg by mouth daily as needed (during winter months)., Disp: , Rfl:     azelastine (ASTELIN) 137 mcg (0.1 %) nasal spray, 1 spray (137 mcg total) by Nasal route 2 (two) times daily., Disp: 30 mL, Rfl: 1    butalbital-acetaminophen-caffeine -40 mg (FIORICET, ESGIC) -40 mg per tablet, Take 1 tablet by mouth every 6 (six) hours as needed for Headaches., Disp: , Rfl:     cholecalciferol, vitamin D3, 50 mcg (2,000 unit) TbDL, Take 2,000 Units by mouth every evening., Disp: , Rfl:     ipratropium (ATROVENT) 21 mcg (0.03 %) nasal spray, 2 sprays by Each Nostril route 3 (three) times daily., Disp: 30 mL, Rfl: 2    ketoconazole (NIZORAL) 2 % shampoo, Apply 1 application  topically 3 (three) times a week., Disp: , Rfl: 6    levocetirizine (XYZAL) 5 MG tablet, TAKE 1 TABLET (5 MG TOTAL) BY MOUTH EVERY EVENING., Disp: 90 tablet, Rfl: 3    meloxicam (MOBIC) 7.5 MG tablet, Take 1 tablet (7.5 mg total) by mouth once daily., Disp: 20 tablet, Rfl: 1    metoprolol succinate (TOPROL-XL) 100 MG 24 hr tablet, Take 1 tablet (100 mg total) by mouth once daily., Disp: 90 tablet, Rfl: 3    nitroGLYCERIN (NITROSTAT) 0.4 MG SL tablet, Place 1 tablet (0.4 mg total) under the tongue every 5 (five) minutes as needed., Disp: 25 tablet, Rfl: 0    olmesartan (BENICAR) 20 MG tablet, Take 1 tablet (20 mg total) by mouth 2 (two) times a day., Disp: 180 tablet, Rfl: 2    ondansetron (ZOFRAN) 4 MG tablet, Take 1 tablet (4 mg total) by mouth every 8 (eight) hours as needed for Nausea., Disp: 25 tablet, Rfl: 2    petrolat,wht/min  "oil/sod chl (DRY EYES OPHT), Place 1 drop into both eyes daily as needed (dry eyes)., Disp: , Rfl:     spironolactone (ALDACTONE) 25 MG tablet, Take 1 tablet (25 mg total) by mouth nightly., Disp: 30 tablet, Rfl: 2    valACYclovir (VALTREX) 1000 MG tablet, Take 1 tablet (1,000 mg total) by mouth 3 (three) times daily. for 7 days, Disp: 21 tablet, Rfl: 0    apixaban (ELIQUIS) 5 mg Tab, Take 1 tablet (5 mg total) by mouth 2 (two) times daily., Disp: 180 tablet, Rfl: 1    furosemide (LASIX) 20 MG tablet, Take 1 tablet (20 mg total) by mouth every other day., Disp: 45 tablet, Rfl: 1    Review of Systems   Constitutional: Negative for chills, decreased appetite, diaphoresis and fever.        HOT FLASHES   HENT:  Negative for congestion and nosebleeds.    Eyes:  Negative for blurred vision and vision loss in right eye.   Cardiovascular:  Negative for chest pain, claudication, cyanosis, dyspnea on exertion (MILD), irregular heartbeat, leg swelling, near-syncope, orthopnea, palpitations (RARE), paroxysmal nocturnal dyspnea and syncope.   Respiratory:  Negative for cough, hemoptysis, shortness of breath and wheezing.    Hematologic/Lymphatic: Negative for bleeding problem. Does not bruise/bleed easily.   Skin:  Negative for color change and rash.   Musculoskeletal:  Positive for arthritis. Negative for back pain and falls.        SENSITIVE LEGS   Gastrointestinal:  Negative for abdominal pain, dysphagia, jaundice, melena and nausea.   Genitourinary:  Negative for dysuria and flank pain.   Neurological:  Negative for dizziness, focal weakness, light-headedness, loss of balance and paresthesias.   Psychiatric/Behavioral:  Negative for altered mental status and depression. The patient has insomnia.    Allergic/Immunologic: Negative for persistent infections.        Objective:      Vitals:    10/08/24 1348 10/08/24 1403   BP: (!) 164/85 134/78   Pulse: 80    Weight: 75.8 kg (167 lb 1.7 oz)    Height: 5' 7" (1.702 m)    PainSc: " 0-No pain      Body mass index is 26.17 kg/m².    Physical Exam  Constitutional:       Appearance: Normal appearance.   HENT:      Head: Normocephalic and atraumatic.   Eyes:      General: No scleral icterus.     Extraocular Movements: Extraocular movements intact.   Neck:      Vascular: No carotid bruit.   Cardiovascular:      Rate and Rhythm: Normal rate and regular rhythm. No extrasystoles are present.     Pulses: Normal pulses.           Carotid pulses are 2+ on the right side and 2+ on the left side.       Radial pulses are 2+ on the right side and 2+ on the left side.        Posterior tibial pulses are 2+ on the right side and 2+ on the left side.      Heart sounds: Murmur heard.      Systolic murmur is present with a grade of 1/6 at the lower left sternal border.      No friction rub. No gallop.   Pulmonary:      Effort: Pulmonary effort is normal.      Breath sounds: Normal breath sounds and air entry.   Abdominal:      Palpations: Abdomen is soft.      Tenderness: There is no abdominal tenderness.   Musculoskeletal:      Cervical back: Neck supple.      Right lower leg: No edema.      Left lower leg: No edema.   Skin:     Capillary Refill: Capillary refill takes less than 2 seconds.   Neurological:      General: No focal deficit present.      Mental Status: She is alert and oriented to person, place, and time.      Cranial Nerves: Cranial nerves 2-12 are intact.   Psychiatric:         Mood and Affect: Mood normal.         Speech: Speech normal.         Behavior: Behavior normal.                 ..    Chemistry        Component Value Date/Time     07/18/2024 1222    K 4.2 07/18/2024 1222     07/18/2024 1222    CO2 28 07/18/2024 1222    BUN 34 (H) 07/18/2024 1222    CREATININE 0.94 07/18/2024 1222     (H) 07/18/2024 1222        Component Value Date/Time    CALCIUM 10.1 07/18/2024 1222    ALKPHOS 71 05/24/2024 1130    AST 57 (H) 05/24/2024 1130    AST 37 (H) 04/18/2016 2014    ALT 40 (H)  05/24/2024 1130    BILITOT 0.9 05/24/2024 1130    ESTGFRAFRICA >60.0 11/24/2021 1100    EGFRNONAA >60.0 11/24/2021 1100            ..  Lab Results   Component Value Date    CHOL 171 01/30/2024    CHOL 209 (H) 02/08/2023    CHOL 218 (H) 08/19/2022     Lab Results   Component Value Date    HDL 59 01/30/2024    HDL 64 02/08/2023    HDL 76 (H) 08/19/2022     Lab Results   Component Value Date    LDLCALC 82.6 01/30/2024    LDLCALC 107.0 02/08/2023    LDLCALC 120.8 08/19/2022     Lab Results   Component Value Date    TRIG 147 01/30/2024    TRIG 190 (H) 02/08/2023    TRIG 106 08/19/2022     Lab Results   Component Value Date    CHOLHDL 34.5 01/30/2024    CHOLHDL 30.6 02/08/2023    CHOLHDL 34.9 08/19/2022     ..  Lab Results   Component Value Date    WBC 8.84 05/24/2024    HGB 14.7 05/24/2024    HCT 47.1 05/24/2024    MCV 90 05/24/2024     05/24/2024       Test(s) Reviewed  I have reviewed the following in detail:  [] Stress test   [] Angiography   [] Echocardiogram   [x] Labs   [x] Other:       Assessment:         ICD-10-CM ICD-9-CM   1. Chronic diastolic heart failure  I50.32 428.32   2. Non-rheumatic mitral regurgitation  I34.0 424.0   3. Carotid artery plaque, bilateral  I65.23 433.10     433.30   4. Elevated left ventricular end-diastolic pressure (LVEDP)  R94.30 794.30   5. PAF (paroxysmal atrial fibrillation)  I48.0 427.31   6. Long term (current) use of anticoagulants  Z79.01 V58.61   7. Essential hypertension  I10 401.9     Problem List Items Addressed This Visit          Cardiac/Vascular    Non-rheumatic mitral regurgitation    Essential hypertension    Relevant Orders    Comprehensive Metabolic Panel    Elevated left ventricular end-diastolic pressure (LVEDP)    Relevant Orders    Comprehensive Metabolic Panel    Carotid artery plaque, bilateral    Relevant Orders    Comprehensive Metabolic Panel    Chronic diastolic heart failure - Primary    Relevant Orders    Comprehensive Metabolic Panel    BNP    PAF  (paroxysmal atrial fibrillation)    Relevant Orders    Magnesium       Hematology    Long term (current) use of anticoagulants    Relevant Orders    Hemoglobin        Plan:         DAILY WEIGHT 2 LB PER DAY 5 LB PER WEEK RULE, NO ANGINA CLASS 1-2 HEART FAILURE STABLE STABLE CLINICAL ARRHYTHMIA DIET EXERCISE STAY ACTIVE AVOID DEHYDRATION RETURN TO CLINIC IN 6 MO WITH LABS  Chronic diastolic heart failure  -     Comprehensive Metabolic Panel; Future; Expected date: 04/08/2025  -     BNP; Future; Expected date: 04/08/2025    Non-rheumatic mitral regurgitation    Carotid artery plaque, bilateral  -     Comprehensive Metabolic Panel; Future; Expected date: 04/08/2025    Elevated left ventricular end-diastolic pressure (LVEDP)  -     Comprehensive Metabolic Panel; Future; Expected date: 04/08/2025    PAF (paroxysmal atrial fibrillation)  -     Magnesium; Future; Expected date: 04/08/2025    Long term (current) use of anticoagulants  -     Hemoglobin; Future; Expected date: 04/08/2025    Essential hypertension  -     Comprehensive Metabolic Panel; Future; Expected date: 04/08/2025    Other orders  -     furosemide (LASIX) 20 MG tablet; Take 1 tablet (20 mg total) by mouth every other day.  Dispense: 45 tablet; Refill: 1  -     apixaban (ELIQUIS) 5 mg Tab; Take 1 tablet (5 mg total) by mouth 2 (two) times daily.  Dispense: 180 tablet; Refill: 1    RTC Low level/low impact aerobic exercise 5x's/wk. Heart healthy diet and risk factor modification.    See labs and med orders.    Aerobic exercise 5x's/wk. Heart healthy diet and risk factor modification.    See labs and med orders.      ALL CV CLINICALLY STABLE, NO ANGINA, NO HF, NO TIA, NO CLINICAL ARRHYTHMIA,CONTINUE CURRENT MEDS, EDUCATION, DIET, EXERCISE

## 2025-02-17 DIAGNOSIS — I48.0 PAROXYSMAL ATRIAL FIBRILLATION: Primary | ICD-10-CM

## 2025-04-03 ENCOUNTER — LAB VISIT (OUTPATIENT)
Dept: LAB | Facility: HOSPITAL | Age: 80
End: 2025-04-03
Attending: INTERNAL MEDICINE
Payer: MEDICARE

## 2025-04-03 DIAGNOSIS — I50.32 CHRONIC DIASTOLIC HEART FAILURE: Chronic | ICD-10-CM

## 2025-04-03 DIAGNOSIS — I48.0 PAF (PAROXYSMAL ATRIAL FIBRILLATION): Chronic | ICD-10-CM

## 2025-04-03 DIAGNOSIS — Z79.01 LONG TERM (CURRENT) USE OF ANTICOAGULANTS: Chronic | ICD-10-CM

## 2025-04-03 DIAGNOSIS — I10 ESSENTIAL HYPERTENSION: Chronic | ICD-10-CM

## 2025-04-03 DIAGNOSIS — I65.23 CAROTID ARTERY PLAQUE, BILATERAL: Chronic | ICD-10-CM

## 2025-04-03 DIAGNOSIS — R94.30 ELEVATED LEFT VENTRICULAR END-DIASTOLIC PRESSURE (LVEDP): ICD-10-CM

## 2025-04-03 LAB
ALBUMIN SERPL BCP-MCNC: 3.7 G/DL (ref 3.5–5.2)
ALP SERPL-CCNC: 66 UNIT/L (ref 40–150)
ALT SERPL W/O P-5'-P-CCNC: 15 UNIT/L (ref 10–44)
ANION GAP (OHS): 7 MMOL/L (ref 8–16)
AST SERPL-CCNC: 19 UNIT/L (ref 11–45)
BILIRUB SERPL-MCNC: 0.4 MG/DL (ref 0.1–1)
BNP SERPL-MCNC: 13 PG/ML (ref 0–99)
BUN SERPL-MCNC: 55 MG/DL (ref 8–23)
CALCIUM SERPL-MCNC: 9.3 MG/DL (ref 8.7–10.5)
CHLORIDE SERPL-SCNC: 104 MMOL/L (ref 95–110)
CO2 SERPL-SCNC: 26 MMOL/L (ref 23–29)
CREAT SERPL-MCNC: 1.9 MG/DL (ref 0.5–1.4)
GFR SERPLBLD CREATININE-BSD FMLA CKD-EPI: 27 ML/MIN/1.73/M2
GLUCOSE SERPL-MCNC: 98 MG/DL (ref 70–110)
HGB BLD-MCNC: 12.8 GM/DL (ref 12–16)
MAGNESIUM SERPL-MCNC: 1.8 MG/DL (ref 1.6–2.6)
POTASSIUM SERPL-SCNC: 5.3 MMOL/L (ref 3.5–5.1)
PROT SERPL-MCNC: 7.5 GM/DL (ref 6–8.4)
SODIUM SERPL-SCNC: 137 MMOL/L (ref 136–145)

## 2025-04-03 PROCEDURE — 80053 COMPREHEN METABOLIC PANEL: CPT

## 2025-04-03 PROCEDURE — 83735 ASSAY OF MAGNESIUM: CPT

## 2025-04-03 PROCEDURE — 85018 HEMOGLOBIN: CPT

## 2025-04-03 PROCEDURE — 83880 ASSAY OF NATRIURETIC PEPTIDE: CPT

## 2025-04-03 PROCEDURE — 36415 COLL VENOUS BLD VENIPUNCTURE: CPT | Mod: PO

## 2025-04-04 ENCOUNTER — TELEPHONE (OUTPATIENT)
Dept: CARDIOLOGY | Facility: CLINIC | Age: 80
End: 2025-04-04
Payer: MEDICARE

## 2025-04-04 NOTE — TELEPHONE ENCOUNTER
Worsening creatinine, DC spironolactone and Lasix and potassium supplement follow-up next week and will repeat labs then, patient understands

## 2025-04-10 ENCOUNTER — TELEPHONE (OUTPATIENT)
Dept: CARDIOLOGY | Facility: CLINIC | Age: 80
End: 2025-04-10

## 2025-04-10 ENCOUNTER — OFFICE VISIT (OUTPATIENT)
Dept: CARDIOLOGY | Facility: CLINIC | Age: 80
End: 2025-04-10
Payer: MEDICARE

## 2025-04-10 ENCOUNTER — LAB VISIT (OUTPATIENT)
Dept: LAB | Facility: HOSPITAL | Age: 80
End: 2025-04-10
Attending: INTERNAL MEDICINE
Payer: MEDICARE

## 2025-04-10 VITALS
WEIGHT: 180.13 LBS | SYSTOLIC BLOOD PRESSURE: 109 MMHG | HEIGHT: 67 IN | HEART RATE: 94 BPM | DIASTOLIC BLOOD PRESSURE: 67 MMHG | BODY MASS INDEX: 28.27 KG/M2

## 2025-04-10 DIAGNOSIS — N17.9 AKI (ACUTE KIDNEY INJURY): Primary | ICD-10-CM

## 2025-04-10 DIAGNOSIS — I34.0 NON-RHEUMATIC MITRAL REGURGITATION: Chronic | ICD-10-CM

## 2025-04-10 DIAGNOSIS — N17.9 AKI (ACUTE KIDNEY INJURY): ICD-10-CM

## 2025-04-10 DIAGNOSIS — I27.82 OTHER CHRONIC PULMONARY EMBOLISM WITHOUT ACUTE COR PULMONALE: Chronic | ICD-10-CM

## 2025-04-10 DIAGNOSIS — R06.02 SOB (SHORTNESS OF BREATH): ICD-10-CM

## 2025-04-10 DIAGNOSIS — I10 ESSENTIAL HYPERTENSION: ICD-10-CM

## 2025-04-10 DIAGNOSIS — I48.0 PAF (PAROXYSMAL ATRIAL FIBRILLATION): Chronic | ICD-10-CM

## 2025-04-10 DIAGNOSIS — I50.32 CHRONIC DIASTOLIC HEART FAILURE: Chronic | ICD-10-CM

## 2025-04-10 LAB
ANION GAP (OHS): 5 MMOL/L (ref 8–16)
BUN SERPL-MCNC: 37 MG/DL (ref 8–23)
CALCIUM SERPL-MCNC: 9.1 MG/DL (ref 8.7–10.5)
CHLORIDE SERPL-SCNC: 109 MMOL/L (ref 95–110)
CO2 SERPL-SCNC: 27 MMOL/L (ref 23–29)
CREAT SERPL-MCNC: 1.8 MG/DL (ref 0.5–1.4)
GFR SERPLBLD CREATININE-BSD FMLA CKD-EPI: 29 ML/MIN/1.73/M2
GLUCOSE SERPL-MCNC: 127 MG/DL (ref 70–110)
POTASSIUM SERPL-SCNC: 6.3 MMOL/L (ref 3.5–5.1)
SODIUM SERPL-SCNC: 141 MMOL/L (ref 136–145)

## 2025-04-10 PROCEDURE — 99214 OFFICE O/P EST MOD 30 MIN: CPT | Mod: S$GLB,,, | Performed by: INTERNAL MEDICINE

## 2025-04-10 PROCEDURE — 3288F FALL RISK ASSESSMENT DOCD: CPT | Mod: CPTII,S$GLB,, | Performed by: INTERNAL MEDICINE

## 2025-04-10 PROCEDURE — 36415 COLL VENOUS BLD VENIPUNCTURE: CPT | Mod: PO

## 2025-04-10 PROCEDURE — 1159F MED LIST DOCD IN RCRD: CPT | Mod: CPTII,S$GLB,, | Performed by: INTERNAL MEDICINE

## 2025-04-10 PROCEDURE — 1126F AMNT PAIN NOTED NONE PRSNT: CPT | Mod: CPTII,S$GLB,, | Performed by: INTERNAL MEDICINE

## 2025-04-10 PROCEDURE — 3074F SYST BP LT 130 MM HG: CPT | Mod: CPTII,S$GLB,, | Performed by: INTERNAL MEDICINE

## 2025-04-10 PROCEDURE — 1101F PT FALLS ASSESS-DOCD LE1/YR: CPT | Mod: CPTII,S$GLB,, | Performed by: INTERNAL MEDICINE

## 2025-04-10 PROCEDURE — 3078F DIAST BP <80 MM HG: CPT | Mod: CPTII,S$GLB,, | Performed by: INTERNAL MEDICINE

## 2025-04-10 PROCEDURE — 80048 BASIC METABOLIC PNL TOTAL CA: CPT

## 2025-04-10 PROCEDURE — 99999 PR PBB SHADOW E&M-EST. PATIENT-LVL III: CPT | Mod: PBBFAC,,, | Performed by: INTERNAL MEDICINE

## 2025-04-10 NOTE — TELEPHONE ENCOUNTER
Contacted by lab for critical potassium of 6.3    Called patient, gave information regarding critical potassium and instructed patient to go to nearest urgent care or ED for blood draw to repeat potassium and treatment. Explained risk of serious cardiac arrhythmia.    Patient understood message and was amenable to seeking care.    Signed:    Tal Latham MD  Cardiology  4/10/2025 7:00 PM

## 2025-04-10 NOTE — PROGRESS NOTES
Subjective:    Patient ID:  Tayler Dominguez is a 79 y.o. female who presents for Follow-up (6 month ) and Fatigue        HPI  DISCUSSED LABS AND GOALS CMP GFR DOWN TO 27 WITH CREATININE UP TO 1.9 POTASSIUM 5.3, BNP HEMOGLOBIN AND MAGNESIUM NORMAL PATIENT WAS CALLED TO STOP DIURETICS, NOT SLEEPING WELL, ALSO TIRED FATIGUE DURING DAY, FOLLOWED BY UROLOGY, SEE ROS    Past Medical History:   Diagnosis Date    Allergy     Arthritis     hands    Breast disorder     Crescendo angina     Edema     Heart murmur     MVP    History of 2019 novel coronavirus disease (COVID-19) 01/26/2021    Hypertension     Kidney stones     Menopause     Migraine headache     Mitral regurgitation     Near syncope     Sinusitis     SOB (shortness of breath)     Supraventricular tachycardia      Past Surgical History:   Procedure Laterality Date    ABDOMINAL SURGERY      ABLATION, ATRIAL FLUTTER, TYPICAL N/A 6/25/2024    Procedure: Ablation, Atrial Flutter, Typical;  Surgeon: Dung Gonsalez MD;  Location: [facility] EP LAB;  Service: Cardiology;  Laterality: N/A;  AFL, PAULIE, CTI, RFA, CLARY, MAC, SK, 3 Prep    APPENDECTOMY      BREAST BIOPSY      CARDIAC CATHETERIZATION  2013    CATARACT EXTRACTION W/  INTRAOCULAR LENS IMPLANT Bilateral     COLONOSCOPY  11/15/2013    Done by Dr. ELTON Lawrence -- report in paper chart    CORONARY ANGIOGRAPHY N/A 08/18/2020    Procedure: ANGIOGRAM, CORONARY ARTERY;  Surgeon: Anjelica James MD;  Location: [facility] CATH;  Service: Cardiology;  Laterality: N/A;    ECHOCARDIOGRAM,TRANSESOPHAGEAL N/A 6/25/2024    Procedure: Transesophageal echo (PAULIE) intra-procedure log documentation;  Surgeon: Cori Ayers MD;  Location: [facility] EP LAB;  Service: Cardiology;  Laterality: N/A;    HYSTERECTOMY      PARTIAL @ 29 YO, COMPLETE @ 32 YO    LEFT HEART CATHETERIZATION N/A 08/18/2020    Procedure: Left heart cath;  Surgeon: Anjelica James MD;  Location: [facility] CATH;  Service: Cardiology;  Laterality: N/A;    RADIOFREQUENCY ABLATION  2013     at [facility] heart      Family History   Problem Relation Name Age of Onset    Cancer Mother      Aneurysm Mother      Hypertension Mother      Heart disease Father      Hyperlipidemia Father      Hypertension Father      Diabetes Father      Heart disease Sister      Heart disease Sister      Heart disease Brother      Kidney disease Brother      Heart disease Brother      Cancer Brother       Social History     Socioeconomic History    Marital status:    Tobacco Use    Smoking status: Never     Passive exposure: Never    Smokeless tobacco: Never   Substance and Sexual Activity    Alcohol use: No    Drug use: No     Social Drivers of Health     Financial Resource Strain: Low Risk  (1/29/2024)    Overall Financial Resource Strain (CARDIA)     Difficulty of Paying Living Expenses: Not hard at all   Food Insecurity: No Food Insecurity (1/30/2024)    Hunger Vital Sign     Worried About Running Out of Food in the Last Year: Never true     Ran Out of Food in the Last Year: Never true   Transportation Needs: No Transportation Needs (1/30/2024)    PRAPARE - Transportation     Lack of Transportation (Medical): No     Lack of Transportation (Non-Medical): No   Physical Activity: Inactive (1/29/2024)    Exercise Vital Sign     Days of Exercise per Week: 0 days     Minutes of Exercise per Session: 0 min   Stress: No Stress Concern Present (1/29/2024)    [place] [employer] - Occupational Stress Questionnaire     Feeling of Stress : Only a little   Housing Stability: Low Risk  (1/30/2024)    Housing Stability Vital Sign     Unable to Pay for Housing in the Last Year: No     Number of Places Lived in the Last Year: 1     Unstable Housing in the Last Year: No       Review of patient's allergies indicates:   Allergen Reactions    Ambien [zolpidem] Rash    Cephalosporins Rash    Cleocin [clindamycin hcl] Rash    Codeine Rash    Erythropoietin analogues Rash    Influenza vaccine tri-sp 09-10 Rash    Levaquin  "[levofloxacin] Rash    Macrobid [nitrofurantoin monohyd/m-cryst] Rash    Morphine Rash    Pcn [penicillins] Rash    Sudal [phenylephrine-pot guaiacolsulf] Rash    Sulfa (sulfonamide antibiotics) Rash     Current Medications[1]    Review of Systems   Constitutional: Positive for malaise/fatigue and weight gain. Negative for chills, decreased appetite, diaphoresis and fever.        HOT FLASHES   HENT:  Negative for congestion and nosebleeds.    Eyes:  Negative for blurred vision and vision loss in right eye.   Cardiovascular:  Positive for dyspnea on exertion (MILD). Negative for chest pain, claudication, cyanosis, irregular heartbeat, leg swelling, near-syncope, orthopnea, palpitations (RARE), paroxysmal nocturnal dyspnea and syncope.   Respiratory:  Negative for cough, hemoptysis, shortness of breath (SOME) and wheezing.    Hematologic/Lymphatic: Negative for bleeding problem. Does not bruise/bleed easily.   Skin:  Negative for color change and rash.   Musculoskeletal:  Positive for arthritis. Negative for back pain and falls.   Gastrointestinal:  Negative for abdominal pain, dysphagia, jaundice, melena and nausea.   Genitourinary:  Negative for dysuria and flank pain.        KIDNEY STONES   Neurological:  Negative for dizziness, focal weakness, light-headedness, loss of balance and paresthesias.   Psychiatric/Behavioral:  Negative for altered mental status and depression. The patient has insomnia.    Allergic/Immunologic: Negative for persistent infections.        Objective:      Vitals:    04/10/25 1322   BP: 109/67   Pulse: 94   Weight: 81.7 kg (180 lb 1.9 oz)   Height: 5' 7" (1.702 m)   PainSc: 0-No pain     Body mass index is 28.21 kg/m².    Physical Exam  Constitutional:       Appearance: Normal appearance.   HENT:      Head: Normocephalic and atraumatic.   Eyes:      General: No scleral icterus.     Extraocular Movements: Extraocular movements intact.      Conjunctiva/sclera: Conjunctivae normal.   Neck:      " Vascular: No carotid bruit.   Cardiovascular:      Rate and Rhythm: Normal rate and regular rhythm. No extrasystoles are present.     Pulses: Normal pulses.           Carotid pulses are 2+ on the right side and 2+ on the left side.       Radial pulses are 2+ on the right side and 2+ on the left side.        Posterior tibial pulses are 2+ on the right side and 2+ on the left side.      Heart sounds: Murmur heard.      Systolic murmur is present with a grade of 1/6 at the lower left sternal border and apex.      No friction rub. No gallop.   Pulmonary:      Effort: Pulmonary effort is normal.      Breath sounds: Normal breath sounds and air entry.   Abdominal:      Palpations: Abdomen is soft.      Tenderness: There is no abdominal tenderness.   Musculoskeletal:      Cervical back: Neck supple.      Right lower leg: No edema.      Left lower leg: No edema.   Skin:     Capillary Refill: Capillary refill takes less than 2 seconds.   Neurological:      General: No focal deficit present.      Mental Status: She is alert and oriented to person, place, and time.      Cranial Nerves: Cranial nerves 2-12 are intact.   Psychiatric:         Mood and Affect: Mood normal.         Speech: Speech normal.         Behavior: Behavior normal.                 ..    Chemistry        Component Value Date/Time     04/10/2025 1420     07/18/2024 1222    K 6.3 (HH) 04/10/2025 1420    K 4.2 07/18/2024 1222     04/10/2025 1420     07/18/2024 1222    CO2 27 04/10/2025 1420    CO2 28 07/18/2024 1222    BUN 37 (H) 04/10/2025 1420    CREATININE 1.8 (H) 04/10/2025 1420     (H) 07/18/2024 1222        Component Value Date/Time    CALCIUM 9.1 04/10/2025 1420    CALCIUM 10.1 07/18/2024 1222    ALKPHOS 66 04/03/2025 1008    ALKPHOS 71 05/24/2024 1130    AST 19 04/03/2025 1008    AST 57 (H) 05/24/2024 1130    AST 37 (H) 04/18/2016 2014    ALT 15 04/03/2025 1008    ALT 40 (H) 05/24/2024 1130    BILITOT 0.4 04/03/2025 1008     BILITOT 0.9 05/24/2024 1130    ESTGFRAFRICA >60.0 11/24/2021 1100    EGFRNONAA >60.0 11/24/2021 1100            ..  Lab Results   Component Value Date    CHOL 171 01/30/2024    CHOL 209 (H) 02/08/2023    CHOL 218 (H) 08/19/2022     Lab Results   Component Value Date    HDL 59 01/30/2024    HDL 64 02/08/2023    HDL 76 (H) 08/19/2022     Lab Results   Component Value Date    LDLCALC 82.6 01/30/2024    LDLCALC 107.0 02/08/2023    LDLCALC 120.8 08/19/2022     Lab Results   Component Value Date    TRIG 147 01/30/2024    TRIG 190 (H) 02/08/2023    TRIG 106 08/19/2022     Lab Results   Component Value Date    CHOLHDL 34.5 01/30/2024    CHOLHDL 30.6 02/08/2023    CHOLHDL 34.9 08/19/2022     ..  Lab Results   Component Value Date    WBC 8.84 05/24/2024    HGB 12.8 04/03/2025    HCT 47.1 05/24/2024    MCV 90 05/24/2024     05/24/2024       Test(s) Reviewed  I have reviewed the following in detail:  [] Stress test   [] Angiography   [] Echocardiogram   [x] Labs   [] Other:       Assessment:         ICD-10-CM ICD-9-CM   1. LUISITO (acute kidney injury)  N17.9 584.9   2. Chronic diastolic heart failure  I50.32 428.32   3. PAF (paroxysmal atrial fibrillation)  I48.0 427.31   4. Other chronic pulmonary embolism without acute cor pulmonale  I27.82 416.2   5. SOB (shortness of breath)  R06.02 786.05   6. Non-rheumatic mitral regurgitation  I34.0 424.0     Problem List Items Addressed This Visit          Pulmonary    SOB (shortness of breath)    Relevant Orders    Echo       Cardiac/Vascular    Non-rheumatic mitral regurgitation    Chronic diastolic heart failure    Relevant Orders    Echo    PAF (paroxysmal atrial fibrillation)    Relevant Orders    Echo       Renal/    LUISITO (acute kidney injury) - Primary    Relevant Orders    Basic Metabolic Panel (Completed)    Ambulatory referral/consult to Nephrology       Hematology    Other chronic pulmonary embolism without acute cor pulmonale        Plan:       RECHECK BMP, TODAY  CONTINUE TO HOLD ALL DIURETICS IN VIEW OF YOU KIDNEY INJURY NOW ALSO HYPERKALEMIA NEPHROLOGY REFERRAL, CHECK ECHO, DAILY WEIGHT, HYDRATION, AVOID ALL NONSTEROIDAL ANTI-INFLAMMATORY DRUGS MOBIC ETCETERA  ALL CV CLINICALLY STABLE, NO ANGINA, CLASS 2 HF, NO TIA, NO CLINICAL ARRHYTHMIA,CONTINUE CURRENT MEDS, EDUCATION, DIET, EXERCISE , RETURN TO CLINIC IN 3 MONTHS        LUISITO (acute kidney injury)  -     Basic Metabolic Panel; Future; Expected date: 04/10/2025  -     Ambulatory referral/consult to Nephrology; Future; Expected date: 04/17/2025    Chronic diastolic heart failure  -     Echo    PAF (paroxysmal atrial fibrillation)  -     Echo    Other chronic pulmonary embolism without acute cor pulmonale    SOB (shortness of breath)  -     Echo    Non-rheumatic mitral regurgitation    RTC Low level/low impact aerobic exercise 5x's/wk. Heart healthy diet and risk factor modification.    See labs and med orders.    Aerobic exercise 5x's/wk. Heart healthy diet and risk factor modification.    See labs and med orders.             [1]   Current Outpatient Medications:     albuterol (PROVENTIL) 2.5 mg /3 mL (0.083 %) nebulizer solution, Take 3 mLs (2.5 mg total) by nebulization every 6 (six) hours as needed for Wheezing. Rescue, Disp: 30 mL, Rfl: 0    apixaban (ELIQUIS) 5 mg Tab, Take 1 tablet (5 mg total) by mouth 2 (two) times daily., Disp: 180 tablet, Rfl: 1    ascorbic acid, vitamin C, (VITAMIN C) 500 MG tablet, Take 500 mg by mouth daily as needed (during winter months)., Disp: , Rfl:     azelastine (ASTELIN) 137 mcg (0.1 %) nasal spray, 1 spray (137 mcg total) by Nasal route 2 (two) times daily., Disp: 30 mL, Rfl: 1    butalbital-acetaminophen-caffeine -40 mg (FIORICET, ESGIC) -40 mg per tablet, Take 1 tablet by mouth every 6 (six) hours as needed for Headaches., Disp: , Rfl:     cholecalciferol, vitamin D3, 50 mcg (2,000 unit) TbDL, Take 2,000 Units by mouth every evening., Disp: , Rfl:     clobetasoL (OLUX)  0.05 % Foam, 1 Application., Disp: , Rfl:     ipratropium (ATROVENT) 21 mcg (0.03 %) nasal spray, 2 sprays by Each Nostril route 3 (three) times daily., Disp: 30 mL, Rfl: 2    ketoconazole (NIZORAL) 2 % shampoo, Apply 1 application  topically 3 (three) times a week., Disp: , Rfl: 6    levocetirizine (XYZAL) 5 MG tablet, TAKE 1 TABLET BY MOUTH EVERY EVENING, Disp: 90 tablet, Rfl: 1    meloxicam (MOBIC) 7.5 MG tablet, Take 1 tablet (7.5 mg total) by mouth once daily., Disp: 20 tablet, Rfl: 1    metoprolol succinate (TOPROL-XL) 100 MG 24 hr tablet, Take 1 tablet (100 mg total) by mouth once daily., Disp: 90 tablet, Rfl: 3    nitroGLYCERIN (NITROSTAT) 0.4 MG SL tablet, Place 1 tablet (0.4 mg total) under the tongue every 5 (five) minutes as needed., Disp: 25 tablet, Rfl: 0    olmesartan (BENICAR) 20 MG tablet, Take 1 tablet (20 mg total) by mouth 2 (two) times a day., Disp: 180 tablet, Rfl: 2    ondansetron (ZOFRAN) 4 MG tablet, Take 1 tablet (4 mg total) by mouth every 8 (eight) hours as needed for Nausea., Disp: 25 tablet, Rfl: 2    petrolat,wht/min oil/sod chl (DRY EYES OPHT), Place 1 drop into both eyes daily as needed (dry eyes)., Disp: , Rfl:     roflumilast (ZORYVE) 0.3 % Foam, 1 Application., Disp: , Rfl:     spironolactone (ALDACTONE) 25 MG tablet, Take 1 tablet (25 mg total) by mouth nightly., Disp: 90 tablet, Rfl: 1    valACYclovir (VALTREX) 1000 MG tablet, Take 1 tablet (1,000 mg total) by mouth 3 (three) times daily. for 7 days, Disp: 21 tablet, Rfl: 0    furosemide (LASIX) 20 MG tablet, Take 1 tablet (20 mg total) by mouth every other day. (Patient not taking: Reported on 4/10/2025), Disp: 45 tablet, Rfl: 1

## 2025-04-11 ENCOUNTER — TELEPHONE (OUTPATIENT)
Dept: CARDIOLOGY | Facility: CLINIC | Age: 80
End: 2025-04-11
Payer: MEDICARE

## 2025-04-11 NOTE — TELEPHONE ENCOUNTER
Patient followed on hyperkalemia she is at [facility] treatment for hyperkalemia follow-up with Nephrology

## 2025-04-12 RX ORDER — METOPROLOL SUCCINATE 100 MG/1
100 TABLET, EXTENDED RELEASE ORAL DAILY
Qty: 90 TABLET | Refills: 0 | Status: SHIPPED | OUTPATIENT
Start: 2025-04-12 | End: 2026-04-12

## 2025-04-14 DIAGNOSIS — I48.3 TYPICAL ATRIAL FLUTTER: ICD-10-CM

## 2025-04-14 DIAGNOSIS — I10 ESSENTIAL HYPERTENSION: ICD-10-CM

## 2025-04-14 DIAGNOSIS — I27.82 OTHER CHRONIC PULMONARY EMBOLISM WITHOUT ACUTE COR PULMONALE: Primary | ICD-10-CM

## 2025-04-14 DIAGNOSIS — I50.32 CHRONIC DIASTOLIC HEART FAILURE: ICD-10-CM

## 2025-04-14 RX ORDER — APIXABAN 5 MG/1
5 TABLET, FILM COATED ORAL 2 TIMES DAILY
Qty: 180 TABLET | Refills: 0 | Status: SHIPPED | OUTPATIENT
Start: 2025-04-14

## 2025-04-28 NOTE — PROGRESS NOTES
Ms. Dominguez is a patient of Dr. Gonsalez and was last seen in clinic 9/27/2024.      Subjective:   Patient ID:  Taylre Dominguez is a 79 y.o. female who presents for follow up of Atrial Fibrillation  .     HPI:    Ms. Dominguez is a 79 y.o. female with AFL (RFA of CTI 6/2024), AF, PE (1/2024), HFpEF here for follow up    Background:    CTI dependent AFL  AF  Pulmonary embolism 1/2024  HFpEF     Background:  1/31/24 admitted with acute bilateral PE > started on treatment dose Eliquis. Noted have newly diagnosed CTI dependent atrial flutter with periods reported of atrial fibrillation.  72 Hr Holter: 100% AFL with periods questionable for course AF. Mean HR 94 (min 44; max 160).   TTE 1/2024: LVEF 55-60%. Normal RV. PASP 43 with CVP 15.   Takes Eliquis 5 mg BID and Toprol XL 25 mg daily     5/10/2024:  Reports palpitations with AFL  Tolerating OAC  Reports ablation 20 years prior at [facility] for possible SVT?  ECG/telemetry strip today which shows AFL    6/25/2024: RFA of CTI    9/27/2024: Pt is 3 mo s/p RFA of CTI. She is doing well from a rhythm standpoint, with only one episode of brief palps since procedure. Chart review indicates pt has hx of AF as well as AFL. CHADSVASc 4 and recommend continuing eliquis for CVA prophylaxis. She is doing very well.    Update (05/05/2025):    S/P hospitalization at [facility] for LUISITO/+K on olmesartan and spironolactone and KCl with lasix. Baseline creat around 1, was 1.86 at admit to 1.44 at discharge.     Today she reports SOB and leg edema. Spironolactone and lasix were stopped due to LUISITO.  On iron infusion via PICC. On PO Magnesium supplement  Taking amlodipine as needed for higher BPs until her medications are optimized.  No exertional CP, syncope reported.    She is currently taking eliquis 5mg BID for stroke prophylaxis and denies significant bleeding episodes but does report easy bruising and bleeding. She is currently being treated with toprol 100mg daily for HR control. Kidney  function is low, with a creatinine of 1.8 on 4/10/2025.    I have personally reviewed the patient's EKG today, which shows sinus rhythm at 97bpm. DC interval is 214. QRS is 82. QTc is 467.    Relevant Cardiac Test Results:    PAULIE (6/25/2024):    PAULIE prior to ablation. After use of echo contrast, no ZEB/LA visualized.    Left Ventricle: The left ventricle is normal in size. Normal wall thickness. There is low normal systolic function with a visually estimated ejection fraction of 50 - 55%. Ejection fraction by visual approximation is 50%.    Right Ventricle: Normal right ventricular cavity size. Wall thickness is normal. Systolic function is normal.    Left Atrium: Left atrium is dilated. The left atrial appendage appears normal. The left atrial appendage has a windsock morphology. Appendage velocity is normal at greater than 40 cm/sec. After use of echo contrast, no ZEB/LA visualized. There is no thrombus in the cavity.    Right Atrium: Right atrium is dilated.    Aortic Valve: The aortic valve is structurally normal.    Mitral Valve: The mitral valve is structurally normal. There is mild regurgitation.    Tricuspid Valve: The tricuspid valve is structurally normal. There is mild regurgitation.    Aorta: Grade 2 atherosclerosis of the descending aorta and in the aortic arch with mild thickening.    Pericardium: There is no pericardial effusion.    Current Outpatient Medications   Medication Sig    acetaminophen (TYLENOL) 650 MG TbSR Take 650 mg by mouth every 4 (four) hours as needed.    allopurinoL (ZYLOPRIM) 300 MG tablet Take 1 tablet (300 mg total) by mouth once daily.    amLODIPine (NORVASC) 5 MG tablet Take 5 mg by mouth once daily.    ascorbic acid, vitamin C, (VITAMIN C) 500 MG tablet Take 500 mg by mouth as needed (takes only during winter months).    baclofen (LIORESAL) 10 MG tablet Take 10 mg by mouth as needed.    butalbital-acetaminophen-caffeine -40 mg (FIORICET, ESGIC) -40 mg per tablet  Take 1 tablet by mouth every 6 (six) hours as needed for Headaches.    cholecalciferol, vitamin D3, 50 mcg (2,000 unit) TbDL Take 2,000 Units by mouth every evening.    ELIQUIS 5 mg Tab take 1 tablet (5 MG total) by mouth 2 (two) times daily.    levocetirizine (XYZAL) 5 MG tablet TAKE 1 TABLET BY MOUTH EVERY EVENING    melatonin 5 mg Cap Take 5 mg by mouth nightly as needed.    metoprolol succinate (TOPROL-XL) 100 MG 24 hr tablet Take 1 tablet (100 mg total) by mouth once daily.    nitroGLYCERIN (NITROSTAT) 0.4 MG SL tablet Place 1 tablet (0.4 mg total) under the tongue every 5 (five) minutes as needed.    ondansetron (ZOFRAN) 4 MG tablet Take 1 tablet (4 mg total) by mouth every 8 (eight) hours as needed for Nausea.    rhubarb root extract (ESTROVEN CMPLT MENOPAUSE RLF) 4 mg Tab Take by mouth.     No current facility-administered medications for this visit.       Review of Systems   Constitutional: Positive for malaise/fatigue.   Cardiovascular:  Positive for dyspnea on exertion and leg swelling. Negative for chest pain, irregular heartbeat and palpitations.   Respiratory:  Positive for shortness of breath.    Hematologic/Lymphatic: Negative for bleeding problem. Bruises/bleeds easily.   Skin:  Negative for rash.   Musculoskeletal:  Negative for myalgias.   Gastrointestinal:  Negative for hematemesis, hematochezia and nausea.   Genitourinary:  Negative for hematuria.   Neurological:  Negative for light-headedness.   Psychiatric/Behavioral:  Negative for altered mental status.    Allergic/Immunologic: Negative for persistent infections.       Objective:          BP (!) 145/70 (Patient Position: Sitting)   Pulse 97   Wt 82.6 kg (182 lb 1.6 oz)   BMI 28.52 kg/m²     Physical Exam  Vitals and nursing note reviewed.   Constitutional:       Appearance: Normal appearance. She is well-developed.   HENT:      Head: Normocephalic.      Nose: Nose normal.   Eyes:      Pupils: Pupils are equal, round, and reactive to light.    Cardiovascular:      Rate and Rhythm: Normal rate and regular rhythm.   Pulmonary:      Effort: No respiratory distress.   Musculoskeletal:         General: Normal range of motion.   Skin:     General: Skin is warm and dry.      Findings: No erythema.   Neurological:      Mental Status: She is alert and oriented to person, place, and time.   Psychiatric:         Speech: Speech normal.         Behavior: Behavior normal.           Lab Results   Component Value Date     04/10/2025     07/18/2024    K 6.3 (HH) 04/10/2025    K 4.2 07/18/2024    MG 1.8 04/03/2025    BUN 37 (H) 04/10/2025    CREATININE 1.8 (H) 04/10/2025    ALT 15 04/03/2025    ALT 40 (H) 05/24/2024    AST 19 04/03/2025    AST 57 (H) 05/24/2024    AST 37 (H) 04/18/2016    HGB 12.8 04/03/2025    HGB 14.7 05/24/2024    HCT 47.1 05/24/2024    TSH 2.230 01/29/2024    LDLCALC 82.6 01/30/2024             Assessment:     1. PAF (paroxysmal atrial fibrillation)    2. Chronic diastolic heart failure    3. Essential hypertension    4. Typical atrial flutter    5. Long term (current) use of anticoagulants        Plan:     In summary, Ms. Dominguez is a 79 y.o. female with AFL (RFA of CTI 6/2024), AF, PE (1/2024), HFpEF here for follow up  She is now 11 months s/p ablation. She appears to be stable from a rhythm standpoint, with no documented or symptomatic recurrence of arrhythmia since procedure. Unfortunately was hospitalized last month with hyperkalemia/LUISITO and is experiencing SOB and leg edema after being taken off her diuretics. BNP prior to hospitalization was WNL. She is working with cardiology and has an echo scheduled for this month.   CHADSVASc 4. She has concerns about continuing eliquis given her anemia (she is now on iron infusions) and very easy bruising/bleeding. No overt bleeding identified. Will update external monitor. As her prior monitors showed only questionable periods of coarse AF vs AFL, she might be a good candidate for a loop  monitor. Will readdress after external monitor results.     Monitor  Consider loop for ongoing AF monitoring off eliquis  RT after testing    *A copy of this note has been sent to Dr. Gonsalez*    Follow up in about 6 months (around 11/5/2025).      ------------------------------------------------------------------    JIMMY Muñoz, NP-C  Cardiac Electrophysiology

## 2025-05-05 ENCOUNTER — HOSPITAL ENCOUNTER (OUTPATIENT)
Dept: CARDIOLOGY | Facility: CLINIC | Age: 80
Discharge: HOME | End: 2025-05-05
Payer: MEDICARE

## 2025-05-05 ENCOUNTER — OFFICE VISIT (OUTPATIENT)
Dept: ELECTROPHYSIOLOGY | Facility: CLINIC | Age: 80
End: 2025-05-05
Payer: MEDICARE

## 2025-05-05 VITALS
SYSTOLIC BLOOD PRESSURE: 145 MMHG | WEIGHT: 182.13 LBS | DIASTOLIC BLOOD PRESSURE: 70 MMHG | BODY MASS INDEX: 28.52 KG/M2 | HEART RATE: 97 BPM

## 2025-05-05 DIAGNOSIS — I48.0 PAF (PAROXYSMAL ATRIAL FIBRILLATION): Primary | ICD-10-CM

## 2025-05-05 DIAGNOSIS — I10 ESSENTIAL HYPERTENSION: ICD-10-CM

## 2025-05-05 DIAGNOSIS — I48.3 TYPICAL ATRIAL FLUTTER: ICD-10-CM

## 2025-05-05 DIAGNOSIS — I50.32 CHRONIC DIASTOLIC HEART FAILURE: ICD-10-CM

## 2025-05-05 DIAGNOSIS — I48.0 PAROXYSMAL ATRIAL FIBRILLATION: ICD-10-CM

## 2025-05-05 DIAGNOSIS — Z79.01 LONG TERM (CURRENT) USE OF ANTICOAGULANTS: ICD-10-CM

## 2025-05-05 LAB
OHS QRS DURATION: 82 MS
OHS QTC CALCULATION: 467 MS

## 2025-05-05 PROCEDURE — 1101F PT FALLS ASSESS-DOCD LE1/YR: CPT | Mod: CPTII,S$GLB,, | Performed by: NURSE PRACTITIONER

## 2025-05-05 PROCEDURE — 3078F DIAST BP <80 MM HG: CPT | Mod: CPTII,S$GLB,, | Performed by: NURSE PRACTITIONER

## 2025-05-05 PROCEDURE — 1159F MED LIST DOCD IN RCRD: CPT | Mod: CPTII,S$GLB,, | Performed by: NURSE PRACTITIONER

## 2025-05-05 PROCEDURE — 3077F SYST BP >= 140 MM HG: CPT | Mod: CPTII,S$GLB,, | Performed by: NURSE PRACTITIONER

## 2025-05-05 PROCEDURE — 1160F RVW MEDS BY RX/DR IN RCRD: CPT | Mod: CPTII,S$GLB,, | Performed by: NURSE PRACTITIONER

## 2025-05-05 PROCEDURE — 93010 ELECTROCARDIOGRAM REPORT: CPT | Mod: S$GLB,,, | Performed by: STUDENT IN AN ORGANIZED HEALTH CARE EDUCATION/TRAINING PROGRAM

## 2025-05-05 PROCEDURE — 99214 OFFICE O/P EST MOD 30 MIN: CPT | Mod: S$GLB,,, | Performed by: NURSE PRACTITIONER

## 2025-05-05 PROCEDURE — 99999 PR PBB SHADOW E&M-EST. PATIENT-LVL IV: CPT | Mod: PBBFAC,,, | Performed by: NURSE PRACTITIONER

## 2025-05-05 PROCEDURE — 3288F FALL RISK ASSESSMENT DOCD: CPT | Mod: CPTII,S$GLB,, | Performed by: NURSE PRACTITIONER

## 2025-05-05 PROCEDURE — 1126F AMNT PAIN NOTED NONE PRSNT: CPT | Mod: CPTII,S$GLB,, | Performed by: NURSE PRACTITIONER

## 2025-05-06 ENCOUNTER — CLINICAL SUPPORT (OUTPATIENT)
Dept: CARDIOLOGY | Facility: HOSPITAL | Age: 80
End: 2025-05-06
Attending: NURSE PRACTITIONER
Payer: MEDICARE

## 2025-05-06 DIAGNOSIS — I48.0 PAF (PAROXYSMAL ATRIAL FIBRILLATION): ICD-10-CM

## 2025-05-19 ENCOUNTER — TELEPHONE (OUTPATIENT)
Dept: CARDIOLOGY | Facility: CLINIC | Age: 80
End: 2025-05-19
Payer: MEDICARE

## 2025-05-30 ENCOUNTER — HOSPITAL ENCOUNTER (OUTPATIENT)
Dept: CARDIOLOGY | Facility: HOSPITAL | Age: 80
Discharge: HOME | End: 2025-05-30
Attending: INTERNAL MEDICINE
Payer: MEDICARE

## 2025-05-30 PROCEDURE — 93306 TTE W/DOPPLER COMPLETE: CPT | Mod: 26,,, | Performed by: INTERNAL MEDICINE

## 2025-05-30 PROCEDURE — 93306 TTE W/DOPPLER COMPLETE: CPT | Mod: PO

## 2025-06-02 ENCOUNTER — PATIENT MESSAGE (OUTPATIENT)
Dept: CARDIOLOGY | Facility: CLINIC | Age: 80
End: 2025-06-02
Payer: MEDICARE

## 2025-06-03 ENCOUNTER — PATIENT MESSAGE (OUTPATIENT)
Dept: CARDIOLOGY | Facility: CLINIC | Age: 80
End: 2025-06-03
Payer: MEDICARE

## 2025-06-03 ENCOUNTER — TELEPHONE (OUTPATIENT)
Dept: CARDIOLOGY | Facility: CLINIC | Age: 80
End: 2025-06-03
Payer: MEDICARE

## 2025-06-03 DIAGNOSIS — R06.02 SOB (SHORTNESS OF BREATH): Primary | ICD-10-CM

## 2025-06-03 DIAGNOSIS — R06.02 SHORTNESS OF BREATH: ICD-10-CM

## 2025-06-05 ENCOUNTER — TELEPHONE (OUTPATIENT)
Dept: CARDIOLOGY | Facility: CLINIC | Age: 80
End: 2025-06-05
Payer: MEDICARE

## 2025-06-06 ENCOUNTER — ANESTHESIA EVENT (OUTPATIENT)
Dept: ENDOSCOPY | Facility: HOSPITAL | Age: 80
End: 2025-06-06
Payer: MEDICARE

## 2025-06-09 ENCOUNTER — HOSPITAL ENCOUNTER (OUTPATIENT)
Dept: CARDIOLOGY | Facility: HOSPITAL | Age: 80
Discharge: HOME | End: 2025-06-09
Attending: INTERNAL MEDICINE | Admitting: INTERNAL MEDICINE
Payer: MEDICARE

## 2025-06-09 ENCOUNTER — ANESTHESIA (OUTPATIENT)
Dept: ENDOSCOPY | Facility: HOSPITAL | Age: 80
End: 2025-06-09
Payer: MEDICARE

## 2025-06-09 ENCOUNTER — HOSPITAL ENCOUNTER (OUTPATIENT)
Facility: HOSPITAL | Age: 80
Discharge: HOME | End: 2025-06-09
Attending: INTERNAL MEDICINE | Admitting: INTERNAL MEDICINE
Payer: MEDICARE

## 2025-06-09 DIAGNOSIS — R06.02 SOB (SHORTNESS OF BREATH): ICD-10-CM

## 2025-06-09 DIAGNOSIS — R06.02 SHORTNESS OF BREATH: ICD-10-CM

## 2025-06-09 LAB
OHS QRS DURATION: 84 MS
OHS QTC CALCULATION: 453 MS

## 2025-06-09 PROCEDURE — 93005 ELECTROCARDIOGRAM TRACING: CPT | Mod: PO

## 2025-06-09 PROCEDURE — 63600175 PHARM REV CODE 636 W HCPCS: Mod: PO | Performed by: NURSE ANESTHETIST, CERTIFIED REGISTERED

## 2025-06-09 PROCEDURE — C8925 2D TEE W OR W/O FOL W/CON,IN: HCPCS | Mod: PO

## 2025-06-09 PROCEDURE — 93010 ELECTROCARDIOGRAM REPORT: CPT | Mod: XU,,, | Performed by: INTERNAL MEDICINE

## 2025-06-09 PROCEDURE — 37000008 HC ANESTHESIA 1ST 15 MINUTES: Mod: PO | Performed by: INTERNAL MEDICINE

## 2025-06-09 PROCEDURE — 37000009 HC ANESTHESIA EA ADD 15 MINS: Mod: PO | Performed by: INTERNAL MEDICINE

## 2025-06-09 PROCEDURE — 63600175 PHARM REV CODE 636 W HCPCS: Mod: PO | Performed by: ANESTHESIOLOGY

## 2025-06-09 PROCEDURE — 93312 ECHO TRANSESOPHAGEAL: CPT | Mod: 26,,, | Performed by: INTERNAL MEDICINE

## 2025-06-09 RX ORDER — ONDANSETRON HYDROCHLORIDE 2 MG/ML
4 INJECTION, SOLUTION INTRAVENOUS DAILY PRN
Status: DISCONTINUED | OUTPATIENT
Start: 2025-06-09 | End: 2025-06-09 | Stop reason: HOSPADM

## 2025-06-09 RX ORDER — SODIUM CHLORIDE, SODIUM LACTATE, POTASSIUM CHLORIDE, CALCIUM CHLORIDE 600; 310; 30; 20 MG/100ML; MG/100ML; MG/100ML; MG/100ML
INJECTION, SOLUTION INTRAVENOUS CONTINUOUS
Status: DISCONTINUED | OUTPATIENT
Start: 2025-06-09 | End: 2025-06-09 | Stop reason: HOSPADM

## 2025-06-09 RX ORDER — PROPOFOL 10 MG/ML
VIAL (ML) INTRAVENOUS
Status: DISCONTINUED | OUTPATIENT
Start: 2025-06-09 | End: 2025-06-09

## 2025-06-09 RX ORDER — LIDOCAINE HYDROCHLORIDE 20 MG/ML
INJECTION INTRAVENOUS
Status: DISCONTINUED | OUTPATIENT
Start: 2025-06-09 | End: 2025-06-09

## 2025-06-09 RX ORDER — SODIUM CHLORIDE, SODIUM LACTATE, POTASSIUM CHLORIDE, CALCIUM CHLORIDE 600; 310; 30; 20 MG/100ML; MG/100ML; MG/100ML; MG/100ML
125 INJECTION, SOLUTION INTRAVENOUS CONTINUOUS
Status: DISCONTINUED | OUTPATIENT
Start: 2025-06-09 | End: 2025-06-09 | Stop reason: HOSPADM

## 2025-06-09 RX ORDER — LIDOCAINE HYDROCHLORIDE 10 MG/ML
1 INJECTION, SOLUTION EPIDURAL; INFILTRATION; INTRACAUDAL; PERINEURAL ONCE
Status: DISCONTINUED | OUTPATIENT
Start: 2025-06-09 | End: 2025-06-09 | Stop reason: HOSPADM

## 2025-06-09 RX ADMIN — LIDOCAINE HYDROCHLORIDE 100 MG: 20 INJECTION INTRAVENOUS at 12:06

## 2025-06-09 RX ADMIN — PROPOFOL 70 MG: 10 INJECTION, EMULSION INTRAVENOUS at 12:06

## 2025-06-09 RX ADMIN — PROPOFOL 20 MG: 10 INJECTION, EMULSION INTRAVENOUS at 12:06

## 2025-06-09 RX ADMIN — SODIUM CHLORIDE, POTASSIUM CHLORIDE, SODIUM LACTATE AND CALCIUM CHLORIDE: 600; 310; 30; 20 INJECTION, SOLUTION INTRAVENOUS at 11:06

## 2025-06-09 NOTE — H&P
Subjective:    Patient ID:  Tayler Dominguez is a 79 y.o. female who presents for shortness of breath mitral regurgitation PAULIE to assess mitral regurgitation      HPI  DISCUSSED LABS AND GOALS CMP GFR DOWN TO 27 WITH CREATININE UP TO 1.9 POTASSIUM 5.3, BNP HEMOGLOBIN AND MAGNESIUM NORMAL PATIENT WAS CALLED TO STOP DIURETICS, NOT SLEEPING WELL, ALSO TIRED FATIGUE DURING DAY, FOLLOWED BY UROLOGY, SEE ROS    Past Medical History:   Diagnosis Date    Allergy     Arthritis     hands    Breast disorder     Crescendo angina     Edema     Heart murmur     MVP    History of 2019 novel coronavirus disease (COVID-19) 01/26/2021    Hypertension     Kidney stones     Menopause     Migraine headache     Mitral regurgitation     Near syncope     Sinusitis     SOB (shortness of breath)     Supraventricular tachycardia      Past Surgical History:   Procedure Laterality Date    ABDOMINAL SURGERY      ABLATION, ATRIAL FLUTTER, TYPICAL N/A 6/25/2024    Procedure: Ablation, Atrial Flutter, Typical;  Surgeon: Dung Gonsalez MD;  Location: [facility] EP LAB;  Service: Cardiology;  Laterality: N/A;  AFL, PAUILE, CTI, RFA, CLARY, MAC, SK, 3 Prep    APPENDECTOMY      BREAST BIOPSY      CARDIAC CATHETERIZATION  2013    CATARACT EXTRACTION W/  INTRAOCULAR LENS IMPLANT Bilateral     COLONOSCOPY  11/15/2013    Done by Dr. ELTON Lawrence -- report in paper chart    CORONARY ANGIOGRAPHY N/A 08/18/2020    Procedure: ANGIOGRAM, CORONARY ARTERY;  Surgeon: Anjelica James MD;  Location: [facility] CATH;  Service: Cardiology;  Laterality: N/A;    ECHOCARDIOGRAM,TRANSESOPHAGEAL N/A 6/25/2024    Procedure: Transesophageal echo (PAULIE) intra-procedure log documentation;  Surgeon: Cori Ayers MD;  Location: [facility] EP LAB;  Service: Cardiology;  Laterality: N/A;    HYSTERECTOMY      PARTIAL @ 29 YO, COMPLETE @ 30 YO    LEFT HEART CATHETERIZATION N/A 08/18/2020    Procedure: Left heart cath;  Surgeon: Anjelica James MD;  Location: [facility] CATH;  Service: Cardiology;  Laterality:  N/A;    RADIOFREQUENCY ABLATION  2013    at [facility] heart      Family History   Problem Relation Name Age of Onset    Cancer Mother      Aneurysm Mother      Hypertension Mother      Heart disease Father      Hyperlipidemia Father      Hypertension Father      Diabetes Father      Heart disease Sister      Heart disease Sister      Heart disease Brother      Kidney disease Brother      Heart disease Brother      Cancer Brother       Social History     Socioeconomic History    Marital status:    Tobacco Use    Smoking status: Never     Passive exposure: Never    Smokeless tobacco: Never   Substance and Sexual Activity    Alcohol use: No    Drug use: No     Social Drivers of Health     Financial Resource Strain: Low Risk  (5/4/2025)    Overall Financial Resource Strain (CARDIA)     Difficulty of Paying Living Expenses: Not hard at all   Food Insecurity: No Food Insecurity (5/4/2025)    Hunger Vital Sign     Worried About Running Out of Food in the Last Year: Never true     Ran Out of Food in the Last Year: Never true   Transportation Needs: No Transportation Needs (5/4/2025)    PRAPARE - Transportation     Lack of Transportation (Medical): No     Lack of Transportation (Non-Medical): No   Physical Activity: Insufficiently Active (5/4/2025)    Exercise Vital Sign     Days of Exercise per Week: 5 days     Minutes of Exercise per Session: 20 min   Stress: No Stress Concern Present (5/4/2025)    [employer] - Occupational Stress Questionnaire     Feeling of Stress : Not at all   Housing Stability: Low Risk  (5/4/2025)    Housing Stability Vital Sign     Unable to Pay for Housing in the Last Year: No     Number of Times Moved in the Last Year: 0     Homeless in the Last Year: No       Review of patient's allergies indicates:   Allergen Reactions    Ambien [zolpidem] Rash    Cephalosporins Rash    Cleocin [clindamycin hcl] Rash    Codeine Rash    Erythropoietin analogues Rash    Influenza  "vaccine tri-sp 09-10 Rash    Levaquin [levofloxacin] Rash    Macrobid [nitrofurantoin monohyd/m-cryst] Rash    Morphine Rash    Pcn [penicillins] Rash    Sudal [phenylephrine-pot guaiacolsulf] Rash    Sulfa (sulfonamide antibiotics) Rash     Current Medications[1]    Review of Systems   Constitutional: Positive for malaise/fatigue and weight gain. Negative for chills, decreased appetite, diaphoresis and fever.        HOT FLASHES   HENT:  Negative for congestion and nosebleeds.    Eyes:  Negative for blurred vision and vision loss in right eye.   Cardiovascular:  Positive for dyspnea on exertion (MILD) and leg swelling. Negative for chest pain, claudication, cyanosis, irregular heartbeat, near-syncope, orthopnea, palpitations (RARE), paroxysmal nocturnal dyspnea and syncope.   Respiratory:  Negative for cough, hemoptysis, shortness of breath (SOME) and wheezing.    Endocrine:        Thyroid problem   Hematologic/Lymphatic: Negative for bleeding problem. Does not bruise/bleed easily.   Skin:  Negative for color change and rash.   Musculoskeletal:  Positive for arthritis. Negative for back pain and falls.   Gastrointestinal:  Positive for dysphagia (some). Negative for abdominal pain, jaundice, melena and nausea.   Genitourinary:  Negative for dysuria and flank pain.   Neurological:  Negative for dizziness, focal weakness, light-headedness, loss of balance and paresthesias.   Psychiatric/Behavioral:  Negative for altered mental status and depression. The patient has insomnia.    Allergic/Immunologic: Negative for persistent infections.        Objective:      Vitals:    06/03/25 1517 06/09/25 1132   BP:  (!) 176/90   Pulse:  82   Resp:  17   Temp:  97.9 °F (36.6 °C)   SpO2:  98%   Weight: 82.1 kg (181 lb)    Height: 5' 7" (1.702 m)      Body mass index is 28.35 kg/m².    Physical Exam  Constitutional:       Appearance: Normal appearance.   HENT:      Head: Normocephalic and atraumatic.   Eyes:      General: No scleral " icterus.     Extraocular Movements: Extraocular movements intact.      Conjunctiva/sclera: Conjunctivae normal.   Neck:      Vascular: No carotid bruit.   Cardiovascular:      Rate and Rhythm: Normal rate and regular rhythm. No extrasystoles are present.     Pulses: Normal pulses.           Carotid pulses are 2+ on the right side and 2+ on the left side.       Radial pulses are 2+ on the right side and 2+ on the left side.        Posterior tibial pulses are 2+ on the right side and 2+ on the left side.      Heart sounds: Murmur heard.      Systolic murmur is present with a grade of 1/6 at the lower left sternal border and apex.      No friction rub. No gallop.   Pulmonary:      Effort: Pulmonary effort is normal.      Breath sounds: Normal breath sounds and air entry.   Abdominal:      Palpations: Abdomen is soft.      Tenderness: There is no abdominal tenderness.   Musculoskeletal:      Cervical back: Neck supple.      Right lower leg: Edema present.      Left lower leg: Edema present.   Skin:     Capillary Refill: Capillary refill takes less than 2 seconds.   Neurological:      General: No focal deficit present.      Mental Status: She is alert and oriented to person, place, and time.      Cranial Nerves: Cranial nerves 2-12 are intact.   Psychiatric:         Mood and Affect: Mood normal.         Speech: Speech normal.         Behavior: Behavior normal.                 ..    Chemistry        Component Value Date/Time     04/10/2025 1420     07/18/2024 1222    K 6.3 (HH) 04/10/2025 1420    K 4.2 07/18/2024 1222     04/10/2025 1420     07/18/2024 1222    CO2 27 04/10/2025 1420    CO2 28 07/18/2024 1222    BUN 37 (H) 04/10/2025 1420    CREATININE 1.8 (H) 04/10/2025 1420     (H) 04/10/2025 1420     (H) 07/18/2024 1222        Component Value Date/Time    CALCIUM 9.1 04/10/2025 1420    CALCIUM 10.1 07/18/2024 1222    ALKPHOS 66 04/03/2025 1008    ALKPHOS 71 05/24/2024 1130    AST 19  04/03/2025 1008    AST 57 (H) 05/24/2024 1130    AST 37 (H) 04/18/2016 2014    ALT 15 04/03/2025 1008    ALT 40 (H) 05/24/2024 1130    BILITOT 0.4 04/03/2025 1008    BILITOT 0.9 05/24/2024 1130    ESTGFRAFRICA >60.0 11/24/2021 1100    EGFRNONAA >60.0 11/24/2021 1100            ..  Lab Results   Component Value Date    CHOL 171 01/30/2024    CHOL 209 (H) 02/08/2023    CHOL 218 (H) 08/19/2022     Lab Results   Component Value Date    HDL 59 01/30/2024    HDL 64 02/08/2023    HDL 76 (H) 08/19/2022     Lab Results   Component Value Date    LDLCALC 82.6 01/30/2024    LDLCALC 107.0 02/08/2023    LDLCALC 120.8 08/19/2022     Lab Results   Component Value Date    TRIG 147 01/30/2024    TRIG 190 (H) 02/08/2023    TRIG 106 08/19/2022     Lab Results   Component Value Date    CHOLHDL 34.5 01/30/2024    CHOLHDL 30.6 02/08/2023    CHOLHDL 34.9 08/19/2022     ..  Lab Results   Component Value Date    WBC 7.27 05/28/2025    HGB 12.0 05/28/2025    HCT 38.0 05/28/2025    MCV 99 (H) 05/28/2025     05/28/2025       Test(s) Reviewed  I have reviewed the following in detail:  [] Stress test   [] Angiography   [] Echocardiogram   [x] Labs   [] Other:       Assessment:         ICD-10-CM ICD-9-CM   1. SOB (shortness of breath)  R06.02 786.05   2. Shortness of breath  R06.02 786.05     Problem List Items Addressed This Visit          Pulmonary    SOB (shortness of breath)    Relevant Orders    Full code    Place in Outpatient (Completed)     Other Visit Diagnoses         Shortness of breath        Relevant Orders    EKG 12-lead             Plan:       RECHECK BMP, TODAY CONTINUE TO HOLD ALL DIURETICS IN VIEW OF YOU KIDNEY INJURY NOW ALSO HYPERKALEMIA NEPHROLOGY REFERRAL, CHECK ECHO, DAILY WEIGHT, HYDRATION, AVOID ALL NONSTEROIDAL ANTI-INFLAMMATORY DRUGS MOBIC ETCETERA  ALL CV CLINICALLY STABLE, NO ANGINA, CLASS 2 HF, NO TIA, NO CLINICAL ARRHYTHMIA,CONTINUE CURRENT MEDS, EDUCATION, DIET, EXERCISE , RETURN TO CLINIC IN 3  MONTHS  Addendum PAULIE to assess mitral regurgitation noted to be moderate on transthoracic echo      SOB (shortness of breath)  -     Full code; Standing  -     Place in Outpatient; Standing    Shortness of breath  -     EKG 12-lead; Standing    Other orders  -     Vital signs; Standing  -     Insert peripheral IV; Standing  -     Use 1% lidocaine at IV site; Standing  -     Notify Anesthesiologist if Patient on Home Insulin Pump; Standing  -     LIDOcaine (PF) 10 mg/ml (1%) injection 10 mg  -     Admit to Phase I recovery, transfer to phase II level of care when Brandie score is 9 out of 10; Standing  -     Vital signs; Standing  -     Intake and output Per protocol; Standing  -     Apply warming blanket; Standing  -     Discharge home from Phase II when PADSS scoring system score is 9 to 10 on PADSS Scoring system met; Standing  -     POCT glucose; Standing  -     lactated ringers infusion  -     lactated ringers infusion  -     ondansetron injection 4 mg  -     IP VTE HIGH RISK PATIENT; Standing    RTC Low level/low impact aerobic exercise 5x's/wk. Heart healthy diet and risk factor modification.    See labs and med orders.    Aerobic exercise 5x's/wk. Heart healthy diet and risk factor modification.    See labs and med orders.             [1]   Current Facility-Administered Medications:     lactated ringers infusion, , Intravenous, Continuous, Nahun Matamoros MD, Last Rate: 10 mL/hr at 06/09/25 1144, New Bag at 06/09/25 1144    LIDOcaine (PF) 10 mg/ml (1%) injection 10 mg, 1 mL, Intradermal, Once, Nahun Matamoros MD

## 2025-06-09 NOTE — TRANSFER OF CARE
"Anesthesia Transfer of Care Note    Patient: Tayler Dominguez    Procedure(s) Performed: Procedure(s) (LRB):  Transesophageal echo (PAULIE) intra-procedure log documentation (N/A)    Patient location: PACU    Anesthesia Type: general    Transport from OR: Transported from OR on room air with adequate spontaneous ventilation    Post pain: adequate analgesia    Post assessment: no apparent anesthetic complications and tolerated procedure well    Post vital signs: stable    Level of consciousness: awake    Nausea/Vomiting: no nausea/vomiting    Complications: none    Transfer of care protocol was followed      Last vitals: Visit Vitals  BP (!) 157/74 (BP Location: Left arm, Patient Position: Lying)   Pulse 86   Temp 36.2 °C (97.2 °F) (Skin)   Resp (!) 21   Ht 5' 7" (1.702 m)   Wt 82.1 kg (181 lb)   SpO2 95%   Breastfeeding No   BMI 28.35 kg/m²     "

## 2025-06-09 NOTE — DISCHARGE INSTRUCTIONS
PAULIE     DO:  Resume all medications today unless otherwise instructed.    DO NOT:  Drive for 24 hours.  Operate heavy machinery.  Sign legal papers.  Make major decisions.  Drink alcohol or smoke for 24 hours.    WHAT TO EXPECT:  Sore throat. This can be soothed with ice chips or lozenges.    Your physician will contact you with results and further instruct you on how you should follow-up.    Contact [phone] for questions.

## 2025-06-09 NOTE — ANESTHESIA PREPROCEDURE EVALUATION
06/09/2025  Tayler Dominguez is a 79 y.o., female.      Pre-op Assessment    I have reviewed the Patient Summary Reports.     I have reviewed the Nursing Notes. I have reviewed the NPO Status.   I have reviewed the Medications.     Review of Systems  Anesthesia Hx:  No problems with previous Anesthesia                Social:  Non-Smoker       Cardiovascular:     Hypertension    Dysrhythmias  Angina CHF            Cardiovascular Symptoms: Angina   Shortness of Breath       Congestive Heart Failure (CHF)                Hypertension     Atrial Fibrillation     Pulmonary:      Shortness of breath                  Renal/:  Chronic Renal Disease        Kidney Function/Disease             Neurological:      Headaches      Dx of Headaches                               Physical Exam  General: Well nourished, Cooperative, Alert and Oriented    Airway:  Mallampati: II   Mouth Opening: Normal  TM Distance: Normal  Tongue: Normal  Neck ROM: Normal ROM    Dental:  Dentures    Chest/Lungs:  Normal Respiratory Rate        Anesthesia Plan  Type of Anesthesia, risks & benefits discussed:    Anesthesia Type: Gen Natural Airway  Intra-op Monitoring Plan: Standard ASA Monitors  Induction:  IV  Informed Consent: Informed consent signed with the Patient and all parties understand the risks and agree with anesthesia plan.  All questions answered.   ASA Score: 3  Day of Surgery Review of History & Physical: H&P Update referred to the surgeon/provider.    Ready For Surgery From Anesthesia Perspective.     .

## 2025-06-09 NOTE — ANESTHESIA POSTPROCEDURE EVALUATION
Anesthesia Post Evaluation    Patient: Tayler Dominguez    Procedure(s) Performed: Procedure(s) (LRB):  Transesophageal echo (PAULIE) intra-procedure log documentation (N/A)    Final Anesthesia Type: general      Patient location during evaluation: PACU  Patient participation: Yes- Able to Participate  Level of consciousness: awake and alert  Post-procedure vital signs: reviewed and stable  Pain management: adequate  Airway patency: patent    PONV status at discharge: No PONV  Anesthetic complications: no      Cardiovascular status: hemodynamically stable  Respiratory status: unassisted and room air  Hydration status: euvolemic  Follow-up not needed.              Vitals Value Taken Time   /76 06/09/25 12:30   Temp 36.2 °C (97.2 °F) 06/09/25 12:27   Pulse 85 06/09/25 12:30   Resp 17 06/09/25 12:30   SpO2 98 % 06/09/25 12:30         No case tracking events are documented in the log.      Pain/Brandie Score: Brandie Score: 10 (6/9/2025 12:42 PM)

## 2025-06-09 NOTE — BRIEF OP NOTE
[facility] - Endoscopy  Brief Operative Note    Surgery Date: 6/9/2025     Surgeons and Role:     * Anjelica James MD - Primary    Assisting Surgeon: None    Pre-op Diagnosis:  SOB (shortness of breath) [R06.02]    Post-op Diagnosis:  Post-Op Diagnosis Codes:     * SOB (shortness of breath) [R06.02]    Procedure(s) (LRB):  Transesophageal echo (ROGERS) intra-procedure log documentation (N/A)    Anesthesia: Choice    Operative Findings:  Rogers, mild-to-moderate MR no reversal flow in the pulmonary veins no intracardiac shunt normal LV function    Estimated Blood Loss:  None         Specimens:   Specimen (24h ago, onward)      None            * No specimens in log *        Discharge Note    OUTCOME: Patient tolerated treatment/procedure well without complication and is now ready for discharge.    DISPOSITION: Home or Self Care    FINAL DIAGNOSIS:  <principal problem not specified>    FOLLOWUP: In clinic    DISCHARGE INSTRUCTIONS:  No discharge procedures on file.

## 2025-06-10 VITALS
DIASTOLIC BLOOD PRESSURE: 84 MMHG | RESPIRATION RATE: 19 BRPM | HEIGHT: 67 IN | OXYGEN SATURATION: 95 % | SYSTOLIC BLOOD PRESSURE: 179 MMHG | HEART RATE: 84 BPM | WEIGHT: 181 LBS | TEMPERATURE: 97 F | BODY MASS INDEX: 28.41 KG/M2

## 2025-06-11 ENCOUNTER — RESULTS FOLLOW-UP (OUTPATIENT)
Dept: ELECTROPHYSIOLOGY | Facility: CLINIC | Age: 80
End: 2025-06-11
Payer: MEDICARE

## 2025-07-10 ENCOUNTER — OFFICE VISIT (OUTPATIENT)
Dept: CARDIOLOGY | Facility: CLINIC | Age: 80
End: 2025-07-10
Payer: MEDICARE

## 2025-07-10 VITALS
SYSTOLIC BLOOD PRESSURE: 123 MMHG | WEIGHT: 177 LBS | HEIGHT: 67 IN | HEART RATE: 77 BPM | BODY MASS INDEX: 27.78 KG/M2 | DIASTOLIC BLOOD PRESSURE: 68 MMHG

## 2025-07-10 DIAGNOSIS — I10 ESSENTIAL HYPERTENSION: Chronic | ICD-10-CM

## 2025-07-10 DIAGNOSIS — I27.82 OTHER CHRONIC PULMONARY EMBOLISM WITHOUT ACUTE COR PULMONALE: ICD-10-CM

## 2025-07-10 DIAGNOSIS — R94.30 ELEVATED LEFT VENTRICULAR END-DIASTOLIC PRESSURE (LVEDP): ICD-10-CM

## 2025-07-10 DIAGNOSIS — I48.0 PAF (PAROXYSMAL ATRIAL FIBRILLATION): Chronic | ICD-10-CM

## 2025-07-10 DIAGNOSIS — I48.3 TYPICAL ATRIAL FLUTTER: ICD-10-CM

## 2025-07-10 DIAGNOSIS — Z79.01 LONG TERM (CURRENT) USE OF ANTICOAGULANTS: Chronic | ICD-10-CM

## 2025-07-10 DIAGNOSIS — I50.32 CHRONIC DIASTOLIC HEART FAILURE: Primary | Chronic | ICD-10-CM

## 2025-07-10 DIAGNOSIS — I34.0 NON-RHEUMATIC MITRAL REGURGITATION: Chronic | ICD-10-CM

## 2025-07-10 PROCEDURE — 3288F FALL RISK ASSESSMENT DOCD: CPT | Mod: CPTII,S$GLB,, | Performed by: INTERNAL MEDICINE

## 2025-07-10 PROCEDURE — 99214 OFFICE O/P EST MOD 30 MIN: CPT | Mod: S$GLB,,, | Performed by: INTERNAL MEDICINE

## 2025-07-10 PROCEDURE — 1159F MED LIST DOCD IN RCRD: CPT | Mod: CPTII,S$GLB,, | Performed by: INTERNAL MEDICINE

## 2025-07-10 PROCEDURE — 1101F PT FALLS ASSESS-DOCD LE1/YR: CPT | Mod: CPTII,S$GLB,, | Performed by: INTERNAL MEDICINE

## 2025-07-10 PROCEDURE — 1126F AMNT PAIN NOTED NONE PRSNT: CPT | Mod: CPTII,S$GLB,, | Performed by: INTERNAL MEDICINE

## 2025-07-10 PROCEDURE — 3078F DIAST BP <80 MM HG: CPT | Mod: CPTII,S$GLB,, | Performed by: INTERNAL MEDICINE

## 2025-07-10 PROCEDURE — 99999 PR PBB SHADOW E&M-EST. PATIENT-LVL III: CPT | Mod: PBBFAC,,, | Performed by: INTERNAL MEDICINE

## 2025-07-10 PROCEDURE — 3074F SYST BP LT 130 MM HG: CPT | Mod: CPTII,S$GLB,, | Performed by: INTERNAL MEDICINE

## 2025-07-10 NOTE — PROGRESS NOTES
Subjective:    Patient ID:  Tayler Dominguez is a 79 y.o. female who presents for Follow-up, Heart Problem, and Shortness of Breath        HPI  DISCUSSED TEST ECHO MODERATE MR NORMAL LV FUNCTION PAULIE WAS THEN DONE TO FURTHER ASSESS MITRAL REGURGITATION VIEW OF SYMPTOMS SHOWED MILD-TO-MODERATE MR NO REVERSAL FLOW IN THE PULMONARY VEINS, MILDLY DILATED LEFT ATRIUM, RECENT LABS THYROID FUNCTION TEST NORMAL, DOING WELL, BREATHING BETTER,HAD EVENT MONITOR PER EP DR. ARZOLA, SHORT EPISODES OF ATRIAL TACHYCARDIA,  TO BE CONSIDERED FOR LOOP RECORDER, SEE ROS    Past Medical History:   Diagnosis Date    Allergy     Arthritis     hands    Breast disorder     Crescendo angina     Edema     Heart murmur     MVP    History of 2019 novel coronavirus disease (COVID-19) 01/26/2021    Hypertension     Kidney stones     Menopause     Migraine headache     Mitral regurgitation     Near syncope     Sinusitis     SOB (shortness of breath)     Supraventricular tachycardia      Past Surgical History:   Procedure Laterality Date    ABDOMINAL SURGERY      ABLATION, ATRIAL FLUTTER, TYPICAL N/A 6/25/2024    Procedure: Ablation, Atrial Flutter, Typical;  Surgeon: Dung Arzola MD;  Location: [facility] EP LAB;  Service: Cardiology;  Laterality: N/A;  AFL, PAULIE, CTI, RFA, CLARY, MAC, SK, 3 Prep    APPENDECTOMY      BREAST BIOPSY      CARDIAC CATHETERIZATION  2013    CATARACT EXTRACTION W/  INTRAOCULAR LENS IMPLANT Bilateral     COLONOSCOPY  11/15/2013    Done by Dr. ELTON Lawrence -- report in paper chart    CORONARY ANGIOGRAPHY N/A 08/18/2020    Procedure: ANGIOGRAM, CORONARY ARTERY;  Surgeon: Anjelica James MD;  Location: [facility] CATH;  Service: Cardiology;  Laterality: N/A;    ECHOCARDIOGRAM,TRANSESOPHAGEAL N/A 6/25/2024    Procedure: Transesophageal echo (PAULIE) intra-procedure log documentation;  Surgeon: Cori Ayers MD;  Location: [facility] EP LAB;  Service: Cardiology;  Laterality: N/A;    ECHOCARDIOGRAM,TRANSESOPHAGEAL N/A 6/9/2025    Procedure:  Transesophageal echo (PAULIE) intra-procedure log documentation;  Surgeon: Anjelica James MD;  Location: [facility] ENDO;  Service: Cardiology;  Laterality: N/A;    HYSTERECTOMY      PARTIAL @ 27 YO, COMPLETE @ 32 YO    LEFT HEART CATHETERIZATION N/A 08/18/2020    Procedure: Left heart cath;  Surgeon: Anjelica James MD;  Location: [facility] CATH;  Service: Cardiology;  Laterality: N/A;    RADIOFREQUENCY ABLATION  2013    at [facility] heart      Family History   Problem Relation Name Age of Onset    Cancer Mother      Aneurysm Mother      Hypertension Mother      Heart disease Father      Hyperlipidemia Father      Hypertension Father      Diabetes Father      Heart disease Sister      Heart disease Sister      Heart disease Brother      Kidney disease Brother      Heart disease Brother      Cancer Brother       Social History     Socioeconomic History    Marital status:    Tobacco Use    Smoking status: Never     Passive exposure: Never    Smokeless tobacco: Never   Vaping Use    Vaping status: Never Used   Substance and Sexual Activity    Alcohol use: No    Drug use: No     Social Drivers of Health     Financial Resource Strain: Low Risk  (5/4/2025)    Overall Financial Resource Strain (CARDIA)     Difficulty of Paying Living Expenses: Not hard at all   Food Insecurity: No Food Insecurity (5/4/2025)    Hunger Vital Sign     Worried About Running Out of Food in the Last Year: Never true     Ran Out of Food in the Last Year: Never true   Transportation Needs: No Transportation Needs (5/4/2025)    PRAPARE - Transportation     Lack of Transportation (Medical): No     Lack of Transportation (Non-Medical): No   Physical Activity: Insufficiently Active (5/4/2025)    Exercise Vital Sign     Days of Exercise per Week: 5 days     Minutes of Exercise per Session: 20 min   Stress: No Stress Concern Present (5/4/2025)    [employer] - Occupational Stress Questionnaire     Feeling of Stress : Not at all    Housing Stability: Low Risk  (5/4/2025)    Housing Stability Vital Sign     Unable to Pay for Housing in the Last Year: No     Number of Times Moved in the Last Year: 0     Homeless in the Last Year: No       Review of patient's allergies indicates:   Allergen Reactions    Ambien [zolpidem] Rash    Cephalosporins Rash    Cleocin [clindamycin hcl] Rash    Codeine Rash    Erythropoietin analogues Rash    Influenza vaccine tri-sp 09-10 Rash    Levaquin [levofloxacin] Rash    Macrobid [nitrofurantoin monohyd/m-cryst] Rash    Morphine Rash    Pcn [penicillins] Rash    Sudal [phenylephrine-pot guaiacolsulf] Rash    Sulfa (sulfonamide antibiotics) Rash     Current Medications[1]    Review of Systems   Constitutional: Negative for chills, decreased appetite, diaphoresis, fever, malaise/fatigue and weight gain.        HOT FLASHES   HENT:  Negative for congestion and nosebleeds.    Eyes:  Negative for blurred vision and vision loss in right eye.   Cardiovascular:  Negative for chest pain, claudication, cyanosis, dyspnea on exertion (MINIMAL), irregular heartbeat, leg swelling, near-syncope, orthopnea, palpitations (RARE), paroxysmal nocturnal dyspnea and syncope.   Respiratory:  Negative for cough, hemoptysis, shortness of breath (SOME) and wheezing.    Hematologic/Lymphatic: Negative for bleeding problem. Does not bruise/bleed easily (SOME).   Skin:  Negative for color change and rash.   Musculoskeletal:  Positive for arthritis. Negative for back pain and falls.   Gastrointestinal:  Negative for abdominal pain, dysphagia, jaundice, melena and nausea.   Genitourinary:  Negative for dysuria and flank pain.   Neurological:  Negative for dizziness, focal weakness, light-headedness, loss of balance and paresthesias.   Psychiatric/Behavioral:  Negative for altered mental status and depression. The patient has insomnia.    Allergic/Immunologic: Negative for persistent infections.        Objective:      Vitals:    07/10/25 1144  "  BP: 123/68   Pulse: 77   Weight: 80.3 kg (177 lb 0.5 oz)   Height: 5' 7" (1.702 m)   PainSc: 0-No pain     Body mass index is 27.73 kg/m².    Physical Exam  Constitutional:       Appearance: Normal appearance.   HENT:      Head: Normocephalic and atraumatic.   Eyes:      General: No scleral icterus.     Extraocular Movements: Extraocular movements intact.   Neck:      Vascular: No carotid bruit.   Cardiovascular:      Rate and Rhythm: Normal rate and regular rhythm. No extrasystoles are present.     Pulses: Normal pulses.           Carotid pulses are 2+ on the right side and 2+ on the left side.       Radial pulses are 2+ on the right side and 2+ on the left side.        Posterior tibial pulses are 2+ on the right side and 2+ on the left side.      Heart sounds: Murmur heard.      Systolic murmur is present with a grade of 1/6 at the lower left sternal border.      No friction rub. No gallop.   Pulmonary:      Effort: Pulmonary effort is normal. No respiratory distress.      Breath sounds: Normal breath sounds.   Abdominal:      Palpations: Abdomen is soft.      Tenderness: There is no abdominal tenderness.   Musculoskeletal:      Cervical back: Neck supple.      Right lower leg: No edema.      Left lower leg: No edema.      Comments: VARICOSE VEINS   Skin:     Capillary Refill: Capillary refill takes less than 2 seconds.   Neurological:      General: No focal deficit present.      Mental Status: She is alert and oriented to person, place, and time.      Cranial Nerves: Cranial nerves 2-12 are intact.   Psychiatric:         Mood and Affect: Mood normal.         Speech: Speech normal.         Behavior: Behavior normal.                 ..    Chemistry        Component Value Date/Time     04/10/2025 1420     07/18/2024 1222    K 6.3 (HH) 04/10/2025 1420    K 4.2 07/18/2024 1222     04/10/2025 1420     07/18/2024 1222    CO2 27 04/10/2025 1420    CO2 28 07/18/2024 1222    BUN 37 (H) 04/10/2025 " 1420    CREATININE 1.8 (H) 04/10/2025 1420     (H) 04/10/2025 1420     (H) 07/18/2024 1222        Component Value Date/Time    CALCIUM 9.1 04/10/2025 1420    CALCIUM 10.1 07/18/2024 1222    ALKPHOS 66 04/03/2025 1008    ALKPHOS 71 05/24/2024 1130    AST 19 04/03/2025 1008    AST 57 (H) 05/24/2024 1130    AST 37 (H) 04/18/2016 2014    ALT 15 04/03/2025 1008    ALT 40 (H) 05/24/2024 1130    BILITOT 0.4 04/03/2025 1008    BILITOT 0.9 05/24/2024 1130    ESTGFRAFRICA >60.0 11/24/2021 1100    EGFRNONAA >60.0 11/24/2021 1100            ..  Lab Results   Component Value Date    CHOL 171 01/30/2024    CHOL 209 (H) 02/08/2023    CHOL 218 (H) 08/19/2022     Lab Results   Component Value Date    HDL 59 01/30/2024    HDL 64 02/08/2023    HDL 76 (H) 08/19/2022     Lab Results   Component Value Date    LDLCALC 82.6 01/30/2024    LDLCALC 107.0 02/08/2023    LDLCALC 120.8 08/19/2022     Lab Results   Component Value Date    TRIG 147 01/30/2024    TRIG 190 (H) 02/08/2023    TRIG 106 08/19/2022     Lab Results   Component Value Date    CHOLHDL 34.5 01/30/2024    CHOLHDL 30.6 02/08/2023    CHOLHDL 34.9 08/19/2022     ..  Lab Results   Component Value Date    WBC 7.27 05/28/2025    HGB 12.0 05/28/2025    HCT 38.0 05/28/2025    MCV 99 (H) 05/28/2025     05/28/2025       Test(s) Reviewed  I have reviewed the following in detail:  [] Stress test   [] Angiography   [x] Echocardiogram   [x] Labs   [x] Other:       Assessment:         ICD-10-CM ICD-9-CM   1. Chronic diastolic heart failure  I50.32 428.32   2. Non-rheumatic mitral regurgitation  I34.0 424.0   3. Essential hypertension  I10 401.9   4. Other chronic pulmonary embolism without acute cor pulmonale  I27.82 416.2   5. Typical atrial flutter  I48.3 427.32   6. Elevated left ventricular end-diastolic pressure (LVEDP)  R94.30 794.30   7. PAF (paroxysmal atrial fibrillation)  I48.0 427.31   8. Long term (current) use of anticoagulants  Z79.01 V58.61     Problem List  Items Addressed This Visit          Cardiac/Vascular    Non-rheumatic mitral regurgitation    Essential hypertension    Relevant Medications    apixaban (ELIQUIS) 5 mg Tab    Elevated left ventricular end-diastolic pressure (LVEDP)    Typical atrial flutter    Relevant Medications    apixaban (ELIQUIS) 5 mg Tab    Chronic diastolic heart failure - Primary    Relevant Medications    apixaban (ELIQUIS) 5 mg Tab    PAF (paroxysmal atrial fibrillation)       Hematology    Other chronic pulmonary embolism without acute cor pulmonale    Relevant Medications    apixaban (ELIQUIS) 5 mg Tab    Long term (current) use of anticoagulants        Plan:         ALL CV CLINICALLY STABLE, NO ANGINA, NO OVERT HF, NO CLASS 1-2 NO TIA, RELATIVELY STABLE CLINICAL ARRHYTHMIA,CONTINUE CURRENT MEDS, EDUCATION, DIET, EXERCISE SOME SYMPTOMS WITH STRESS ANXIETY BEFORE NOW BETTER, LOOP RECORDER IS REASONABLE IF SHE WANTS TO BE OFF ELIQUIS DISCUSSED PROS AND CONS, RETURN TO CLINIC IN 6 MONTHS        Chronic diastolic heart failure  Comments:  IMPROVED  Orders:  -     apixaban (ELIQUIS) 5 mg Tab; Take 1 tablet (5 mg total) by mouth 2 (two) times daily.  Dispense: 180 tablet; Refill: 1    Non-rheumatic mitral regurgitation    Essential hypertension  Comments:  CONTROLLED  Orders:  -     apixaban (ELIQUIS) 5 mg Tab; Take 1 tablet (5 mg total) by mouth 2 (two) times daily.  Dispense: 180 tablet; Refill: 1    Other chronic pulmonary embolism without acute cor pulmonale  -     apixaban (ELIQUIS) 5 mg Tab; Take 1 tablet (5 mg total) by mouth 2 (two) times daily.  Dispense: 180 tablet; Refill: 1    Typical atrial flutter  Comments:  RESOLVED  Orders:  -     apixaban (ELIQUIS) 5 mg Tab; Take 1 tablet (5 mg total) by mouth 2 (two) times daily.  Dispense: 180 tablet; Refill: 1    Elevated left ventricular end-diastolic pressure (LVEDP)    PAF (paroxysmal atrial fibrillation)    Long term (current) use of anticoagulants    RTC Low level/low impact aerobic  exercise 5x's/wk. Heart healthy diet and risk factor modification.    See labs and med orders.    Aerobic exercise 5x's/wk. Heart healthy diet and risk factor modification.    See labs and med orders.             [1]   Current Outpatient Medications:     acetaminophen (TYLENOL) 650 MG TbSR, Take 650 mg by mouth every 4 (four) hours as needed., Disp: , Rfl:     allopurinoL (ZYLOPRIM) 100 MG tablet, Take 100 mg by mouth once daily., Disp: , Rfl:     baclofen (LIORESAL) 10 MG tablet, Take 10 mg by mouth as needed., Disp: , Rfl:     butalbital-acetaminophen-caffeine -40 mg (FIORICET, ESGIC) -40 mg per tablet, Take 1 tablet by mouth every 6 (six) hours as needed for Headaches., Disp: , Rfl:     furosemide (LASIX) 40 MG tablet, Take 40 mg by mouth 2 (two) times daily., Disp: , Rfl:     ketoconazole (NIZORAL) 2 % shampoo, Apply topically., Disp: , Rfl:     levocetirizine (XYZAL) 5 MG tablet, TAKE 1 TABLET BY MOUTH EVERY EVENING, Disp: 90 tablet, Rfl: 1    magnesium chloride (SLOW-MAG) 71.5 mg TbEC, Take 72 mg by mouth 2 (two) times a day., Disp: , Rfl:     metoprolol succinate (TOPROL-XL) 100 MG 24 hr tablet, Take 1 tablet (100 mg total) by mouth once daily., Disp: 90 tablet, Rfl: 0    mometasone (ELOCON) 0.1 % solution, 1 application to affected area Externally Once a day to itchy areas on scalp prn; Duration: 30, Disp: , Rfl:     nitroGLYCERIN (NITROSTAT) 0.4 MG SL tablet, Place 1 tablet (0.4 mg total) under the tongue every 5 (five) minutes as needed., Disp: 25 tablet, Rfl: 0    olmesartan-hydrochlorothiazide (BENICAR HCT) 40-25 mg per tablet, Take 1 tablet by mouth every evening., Disp: 90 tablet, Rfl: 3    ondansetron (ZOFRAN) 4 MG tablet, Take 1 tablet (4 mg total) by mouth every 8 (eight) hours as needed for Nausea., Disp: 25 tablet, Rfl: 2    promethazine (PHENERGAN) 25 MG tablet, 1 tablet as needed Orally every 12 hrs; Duration: 30 day(s), Disp: , Rfl:     rhubarb root extract (ESTROVEN CMPLT MENOPAUSE  RLF) 4 mg Tab, Take by mouth., Disp: , Rfl:     scopolamine (TRANSDERM-SCOP) 1.3-1.5 mg (1 mg over 3 days), 1 patch to skin behind the ear as needed Transdermal; Duration: 30 day(s), Disp: , Rfl:     apixaban (ELIQUIS) 5 mg Tab, Take 1 tablet (5 mg total) by mouth 2 (two) times daily., Disp: 180 tablet, Rfl: 1

## 2025-08-17 DIAGNOSIS — I10 ESSENTIAL HYPERTENSION: ICD-10-CM

## 2025-08-17 DIAGNOSIS — I50.32 CHRONIC DIASTOLIC HEART FAILURE: Chronic | ICD-10-CM

## 2025-08-17 DIAGNOSIS — I48.0 PAF (PAROXYSMAL ATRIAL FIBRILLATION): Chronic | ICD-10-CM

## 2025-08-18 RX ORDER — METOPROLOL SUCCINATE 100 MG/1
100 TABLET, EXTENDED RELEASE ORAL DAILY
Qty: 90 TABLET | Refills: 0 | Status: SHIPPED | OUTPATIENT
Start: 2025-08-18

## (undated) DEVICE — KIT PROBE COVER WITH GEL

## (undated) DEVICE — PAD RADI FEMORAL

## (undated) DEVICE — INTRODUCER HEMOSTASIS 7.5F

## (undated) DEVICE — KIT ENSITE ELECTRODE SURFACE

## (undated) DEVICE — PAD GROUND UNIV STYLE CORD 9IN

## (undated) DEVICE — INTRO 8.5FR 63CM SRO

## (undated) DEVICE — R CATH TRICSPD HALO XP 7FRX110

## (undated) DEVICE — SHEATH HEMOSTASIS 8.5FR

## (undated) DEVICE — PAD DEFIB CADENCE ADULT R2

## (undated) DEVICE — CATH SAFIRE BI-DIR 7FR LRG

## (undated) DEVICE — R CATH BIDIRECTIONL DF CRV 7FR